# Patient Record
Sex: FEMALE | Race: BLACK OR AFRICAN AMERICAN | NOT HISPANIC OR LATINO | Employment: UNEMPLOYED | URBAN - METROPOLITAN AREA
[De-identification: names, ages, dates, MRNs, and addresses within clinical notes are randomized per-mention and may not be internally consistent; named-entity substitution may affect disease eponyms.]

---

## 2017-01-02 ENCOUNTER — ANESTHESIA EVENT (OUTPATIENT)
Dept: SURGERY | Facility: HOSPITAL | Age: 47
DRG: 857 | End: 2017-01-02
Payer: COMMERCIAL

## 2017-01-02 ENCOUNTER — ANESTHESIA (OUTPATIENT)
Dept: SURGERY | Facility: HOSPITAL | Age: 47
DRG: 857 | End: 2017-01-02
Payer: COMMERCIAL

## 2017-01-02 PROCEDURE — 63600175 PHARM REV CODE 636 W HCPCS: Performed by: NURSE ANESTHETIST, CERTIFIED REGISTERED

## 2017-01-02 PROCEDURE — 25000003 PHARM REV CODE 250: Performed by: NURSE ANESTHETIST, CERTIFIED REGISTERED

## 2017-01-02 RX ORDER — ROCURONIUM BROMIDE 10 MG/ML
INJECTION, SOLUTION INTRAVENOUS
Status: DISCONTINUED | OUTPATIENT
Start: 2017-01-02 | End: 2017-01-02

## 2017-01-02 RX ORDER — PROPOFOL 10 MG/ML
VIAL (ML) INTRAVENOUS
Status: DISCONTINUED | OUTPATIENT
Start: 2017-01-02 | End: 2017-01-02

## 2017-01-02 RX ORDER — SODIUM CHLORIDE, SODIUM LACTATE, POTASSIUM CHLORIDE, CALCIUM CHLORIDE 600; 310; 30; 20 MG/100ML; MG/100ML; MG/100ML; MG/100ML
INJECTION, SOLUTION INTRAVENOUS CONTINUOUS PRN
Status: DISCONTINUED | OUTPATIENT
Start: 2017-01-02 | End: 2017-01-02

## 2017-01-02 RX ORDER — LIDOCAINE HCL/PF 100 MG/5ML
SYRINGE (ML) INTRAVENOUS
Status: DISCONTINUED | OUTPATIENT
Start: 2017-01-02 | End: 2017-01-02

## 2017-01-02 RX ORDER — ONDANSETRON 2 MG/ML
INJECTION INTRAMUSCULAR; INTRAVENOUS
Status: DISCONTINUED | OUTPATIENT
Start: 2017-01-02 | End: 2017-01-02

## 2017-01-02 RX ORDER — FENTANYL CITRATE 50 UG/ML
INJECTION, SOLUTION INTRAMUSCULAR; INTRAVENOUS
Status: DISCONTINUED | OUTPATIENT
Start: 2017-01-02 | End: 2017-01-02

## 2017-01-02 RX ORDER — MIDAZOLAM HYDROCHLORIDE 1 MG/ML
INJECTION, SOLUTION INTRAMUSCULAR; INTRAVENOUS
Status: DISCONTINUED | OUTPATIENT
Start: 2017-01-02 | End: 2017-01-02

## 2017-01-02 RX ORDER — PHENYLEPHRINE HYDROCHLORIDE 10 MG/ML
INJECTION INTRAVENOUS
Status: DISCONTINUED | OUTPATIENT
Start: 2017-01-02 | End: 2017-01-02

## 2017-01-02 RX ORDER — SUCCINYLCHOLINE CHLORIDE 20 MG/ML
INJECTION INTRAMUSCULAR; INTRAVENOUS
Status: DISCONTINUED | OUTPATIENT
Start: 2017-01-02 | End: 2017-01-02

## 2017-01-02 RX ADMIN — PHENYLEPHRINE HYDROCHLORIDE 200 MCG: 10 INJECTION INTRAVENOUS at 02:01

## 2017-01-02 RX ADMIN — SUCCINYLCHOLINE CHLORIDE 140 MG: 20 INJECTION, SOLUTION INTRAMUSCULAR; INTRAVENOUS at 02:01

## 2017-01-02 RX ADMIN — ONDANSETRON 4 MG: 2 INJECTION, SOLUTION INTRAMUSCULAR; INTRAVENOUS at 02:01

## 2017-01-02 RX ADMIN — MIDAZOLAM HYDROCHLORIDE 2 MG: 1 INJECTION, SOLUTION INTRAMUSCULAR; INTRAVENOUS at 02:01

## 2017-01-02 RX ADMIN — LIDOCAINE HYDROCHLORIDE 80 MG: 20 INJECTION, SOLUTION INTRAVENOUS at 02:01

## 2017-01-02 RX ADMIN — SODIUM CHLORIDE, SODIUM LACTATE, POTASSIUM CHLORIDE, AND CALCIUM CHLORIDE: 600; 310; 30; 20 INJECTION, SOLUTION INTRAVENOUS at 02:01

## 2017-01-02 RX ADMIN — PROPOFOL 150 MG: 10 INJECTION, EMULSION INTRAVENOUS at 02:01

## 2017-01-02 RX ADMIN — PROPOFOL 100 MG: 10 INJECTION, EMULSION INTRAVENOUS at 02:01

## 2017-01-02 RX ADMIN — ROCURONIUM BROMIDE 5 MG: 10 INJECTION, SOLUTION INTRAVENOUS at 02:01

## 2017-01-02 RX ADMIN — FENTANYL CITRATE 100 MCG: 50 INJECTION, SOLUTION INTRAMUSCULAR; INTRAVENOUS at 02:01

## 2017-01-02 NOTE — ANESTHESIA RELEASE NOTE
"Anesthesia Release from PACU Note    Patient: Clarissa Turner    Procedure(s) Performed: Procedure(s):  INCISION AND DRAINAGE (I&D), ABSCESS abdominal wall    Anesthesia type: general    Post pain: Adequate analgesia    Post assessment: no apparent anesthetic complications    Last Vitals:   Visit Vitals    /66    Pulse 110    Temp 37.4 °C (99.3 °F) (Temporal)    Resp 16    Ht 5' 5" (1.651 m)    Wt 129.5 kg (285 lb 7.9 oz)    LMP 04/14/2015 (Exact Date)    SpO2 100%    Breastfeeding No    BMI 47.51 kg/m2       Post vital signs: stable    Level of consciousness: awake    Nausea/Vomiting: no nausea/no vomiting    Complications: none    Airway Patency: patent    Respiratory: unassisted    Cardiovascular: stable and blood pressure at baseline    Hydration: euvolemic  "

## 2017-01-02 NOTE — TRANSFER OF CARE
"Anesthesia Transfer of Care Note    Patient: Clarissa Turner    Procedure(s) Performed: Procedure(s):  INCISION AND DRAINAGE (I&D), ABSCESS abdominal wall    Patient location: PACU    Anesthesia Type: general    Transport from OR: Transported from OR on room air with adequate spontaneous ventilation    Post pain: adequate analgesia    Post assessment: no apparent anesthetic complications and tolerated procedure well    Post vital signs: stable    Level of consciousness: awake    Nausea/Vomiting: no nausea/vomiting    Complications: none          Last vitals:   Visit Vitals    /74 (BP Location: Right arm, Patient Position: Lying, BP Method: Automatic)    Pulse 101    Temp 37.3 °C (99.2 °F) (Oral)    Resp 18    Ht 5' 5" (1.651 m)    Wt 129.5 kg (285 lb 7.9 oz)    LMP 04/14/2015 (Exact Date)    SpO2 95%    Breastfeeding No    BMI 47.51 kg/m2     "

## 2017-01-02 NOTE — ANESTHESIA POSTPROCEDURE EVALUATION
"Anesthesia Post Evaluation    Patient: Clarissa Turner    Procedure(s) Performed: Procedure(s):  INCISION AND DRAINAGE (I&D), ABSCESS abdominal wall    Final Anesthesia Type: general  Patient location during evaluation: PACU  Patient participation: Yes- Able to Participate  Level of consciousness: awake and alert  Post-procedure vital signs: reviewed and stable  Pain management: adequate  Airway patency: patent  PONV status at discharge: No PONV  Anesthetic complications: no      Cardiovascular status: blood pressure returned to baseline  Respiratory status: unassisted  Hydration status: euvolemic  Follow-up not needed.        Visit Vitals    /66    Pulse 110    Temp 37.4 °C (99.3 °F) (Temporal)    Resp 16    Ht 5' 5" (1.651 m)    Wt 129.5 kg (285 lb 7.9 oz)    LMP 04/14/2015 (Exact Date)    SpO2 100%    Breastfeeding No    BMI 47.51 kg/m2       Pain/Miranda Score: Pain Assessment Performed: Yes (1/2/2017 12:04 PM)  Presence of Pain: complains of pain/discomfort (1/2/2017 12:04 PM)  Pain Rating Prior to Med Admin: 8 (1/2/2017 10:18 AM)  Pain Rating Post Med Admin: 5 (1/2/2017 11:18 AM)      "

## 2017-01-02 NOTE — ANESTHESIA PREPROCEDURE EVALUATION
01/02/2017  Clarissa Turner is a 46 y.o., female.    [facility] Anesthesia Evaluation    I have reviewed the Patient Summary Reports.        Review of Systems  Anesthesia Hx:  Hx of Anesthetic complications  History of prior surgery of interest to airway management or planning: Previous anesthesia: General Airway issues documented on chart review include mask, easy, GETA, easy direct laryngoscopy  Denies Family Hx of Anesthesia complications.  Personal Hx of Anesthesia complications, Post-Operative Nausea/Vomiting, in the past, but not with recent anesthetics / prophylaxis   Social:  Non-Smoker    Cardiovascular:   Hypertension, well controlled  Hypertension, Essential Hypertension    Endocrine:   Diabetes, poorly controlled, type 2  Diabetes, Type 2 Diabetes , controlled by diet, oral hypoglycemics. Secondary etiology is Infection.  Thyroid Disease, Hyperthyroidism        Physical Exam  General:  Morbid Obesity    Airway/Jaw/Neck:  Airway Findings: Mouth Opening: Normal      Chest/Lungs:  Chest/Lungs Findings: Clear to auscultation     Heart/Vascular:  Heart Findings: Rate: Normal  Rhythm: Regular Rhythm        Mental Status:  Mental Status Findings:  Cooperative         Anesthesia Plan  Type of Anesthesia, risks & benefits discussed:  Anesthesia Type:  general  Patient's Preference:   Intra-op Monitoring Plan:   Intra-op Monitoring Plan Comments:   Post Op Pain Control Plan:   Post Op Pain Control Plan Comments:   Induction:    Beta Blocker:         Informed Consent: Patient understands risks and agrees with Anesthesia plan.  Questions answered.   ASA Score: 2     Day of Surgery Review of History & Physical:            Ready For Surgery From Anesthesia Perspective.

## 2017-01-06 ENCOUNTER — ANESTHESIA EVENT (OUTPATIENT)
Dept: SURGERY | Facility: HOSPITAL | Age: 47
DRG: 857 | End: 2017-01-06
Payer: COMMERCIAL

## 2017-01-06 ENCOUNTER — ANESTHESIA (OUTPATIENT)
Dept: SURGERY | Facility: HOSPITAL | Age: 47
DRG: 857 | End: 2017-01-06
Payer: COMMERCIAL

## 2017-01-06 PROCEDURE — 25000003 PHARM REV CODE 250: Performed by: SURGERY

## 2017-01-06 PROCEDURE — 25000003 PHARM REV CODE 250: Performed by: NURSE ANESTHETIST, CERTIFIED REGISTERED

## 2017-01-06 PROCEDURE — 63600175 PHARM REV CODE 636 W HCPCS: Performed by: NURSE ANESTHETIST, CERTIFIED REGISTERED

## 2017-01-06 RX ORDER — DEXAMETHASONE SODIUM PHOSPHATE 4 MG/ML
INJECTION, SOLUTION INTRA-ARTICULAR; INTRALESIONAL; INTRAMUSCULAR; INTRAVENOUS; SOFT TISSUE
Status: DISCONTINUED | OUTPATIENT
Start: 2017-01-06 | End: 2017-01-06

## 2017-01-06 RX ORDER — ALBUTEROL SULFATE 90 UG/1
AEROSOL, METERED RESPIRATORY (INHALATION)
Status: DISCONTINUED | OUTPATIENT
Start: 2017-01-06 | End: 2017-01-06

## 2017-01-06 RX ORDER — PROPOFOL 10 MG/ML
VIAL (ML) INTRAVENOUS
Status: DISCONTINUED | OUTPATIENT
Start: 2017-01-06 | End: 2017-01-06

## 2017-01-06 RX ORDER — ONDANSETRON 2 MG/ML
INJECTION INTRAMUSCULAR; INTRAVENOUS
Status: DISCONTINUED | OUTPATIENT
Start: 2017-01-06 | End: 2017-01-06

## 2017-01-06 RX ORDER — FENTANYL CITRATE 50 UG/ML
INJECTION, SOLUTION INTRAMUSCULAR; INTRAVENOUS
Status: DISCONTINUED | OUTPATIENT
Start: 2017-01-06 | End: 2017-01-06

## 2017-01-06 RX ORDER — MIDAZOLAM HYDROCHLORIDE 1 MG/ML
INJECTION, SOLUTION INTRAMUSCULAR; INTRAVENOUS
Status: DISCONTINUED | OUTPATIENT
Start: 2017-01-06 | End: 2017-01-06

## 2017-01-06 RX ORDER — SUCCINYLCHOLINE CHLORIDE 20 MG/ML
INJECTION INTRAMUSCULAR; INTRAVENOUS
Status: DISCONTINUED | OUTPATIENT
Start: 2017-01-06 | End: 2017-01-06

## 2017-01-06 RX ORDER — ROCURONIUM BROMIDE 10 MG/ML
INJECTION, SOLUTION INTRAVENOUS
Status: DISCONTINUED | OUTPATIENT
Start: 2017-01-06 | End: 2017-01-06

## 2017-01-06 RX ADMIN — PROPOFOL 100 MG: 10 INJECTION, EMULSION INTRAVENOUS at 12:01

## 2017-01-06 RX ADMIN — ROCURONIUM BROMIDE 5 MG: 10 INJECTION, SOLUTION INTRAVENOUS at 12:01

## 2017-01-06 RX ADMIN — SUCCINYLCHOLINE CHLORIDE 140 MG: 20 INJECTION, SOLUTION INTRAMUSCULAR; INTRAVENOUS at 12:01

## 2017-01-06 RX ADMIN — ONDANSETRON 4 MG: 2 INJECTION, SOLUTION INTRAMUSCULAR; INTRAVENOUS at 12:01

## 2017-01-06 RX ADMIN — SODIUM CHLORIDE, SODIUM LACTATE, POTASSIUM CHLORIDE, AND CALCIUM CHLORIDE: 600; 310; 30; 20 INJECTION, SOLUTION INTRAVENOUS at 12:01

## 2017-01-06 RX ADMIN — SUCCINYLCHOLINE CHLORIDE 20 MG: 20 INJECTION, SOLUTION INTRAMUSCULAR; INTRAVENOUS at 12:01

## 2017-01-06 RX ADMIN — ALBUTEROL SULFATE 3 PUFF: 90 AEROSOL, METERED RESPIRATORY (INHALATION) at 01:01

## 2017-01-06 RX ADMIN — PROPOFOL 150 MG: 10 INJECTION, EMULSION INTRAVENOUS at 12:01

## 2017-01-06 RX ADMIN — DEXAMETHASONE SODIUM PHOSPHATE 8 MG: 4 INJECTION, SOLUTION INTRA-ARTICULAR; INTRALESIONAL; INTRAMUSCULAR; INTRAVENOUS; SOFT TISSUE at 12:01

## 2017-01-06 RX ADMIN — FENTANYL CITRATE 100 MCG: 50 INJECTION, SOLUTION INTRAMUSCULAR; INTRAVENOUS at 12:01

## 2017-01-06 RX ADMIN — MIDAZOLAM HYDROCHLORIDE 2 MG: 1 INJECTION, SOLUTION INTRAMUSCULAR; INTRAVENOUS at 12:01

## 2017-01-06 NOTE — ANESTHESIA PREPROCEDURE EVALUATION
01/06/2017  Clarissa Turner is a 46 y.o., female.    [facility] Anesthesia Evaluation    I have reviewed the Patient Summary Reports.    I have reviewed the Nursing Notes.   I have reviewed the Medications.     Review of Systems  Anesthesia Hx:  No problems with previous Anesthesia    Social:  Non-Smoker    Hematology/Oncology:         -- Anemia:   Cardiovascular:   Hypertension    Pulmonary:  Pulmonary Normal    Renal/:   Chronic Renal Disease    Neurological:  Neurology Normal    Endocrine:   Diabetes, type 2, using insulin        Physical Exam  General:  Morbid Obesity    Airway/Jaw/Neck:  Airway Findings: Mouth Opening: Normal General Airway Assessment: Adult  Mallampati: I  TM Distance: Normal, at least 6 cm       Chest/Lungs:  Chest/Lungs Findings: Clear to auscultation     Heart/Vascular:  Heart Findings: Rate: Normal  Rhythm: Regular Rhythm        Mental Status:  Mental Status Findings:  Cooperative, Alert and Oriented         Anesthesia Plan  Type of Anesthesia, risks & benefits discussed:  Anesthesia Type:  general  Patient's Preference:   Intra-op Monitoring Plan:   Intra-op Monitoring Plan Comments:   Post Op Pain Control Plan:   Post Op Pain Control Plan Comments:   Induction:   IV  Beta Blocker:  Patient is not currently on a Beta-Blocker (No further documentation required).       Informed Consent: Patient understands risks and agrees with Anesthesia plan.  Questions answered. Anesthesia consent signed with patient.  ASA Score: 3     Day of Surgery Review of History & Physical: I have interviewed and examined the patient. I have reviewed the patient's H&P dated:  There are no significant changes.          Ready For Surgery From Anesthesia Perspective.

## 2017-01-06 NOTE — TRANSFER OF CARE
"Anesthesia Transfer of Care Note    Patient: Clarissa Turner    Procedure(s) Performed: Procedure(s) with comments:  INCISION AND DRAINAGE (I&D), ABSCESS abdominal wall with wound vac placement - Wound vac    Patient location: PACU    Anesthesia Type: general    Transport from OR: Transported from OR on room air with adequate spontaneous ventilation    Post pain: adequate analgesia    Post assessment: no apparent anesthetic complications    Post vital signs: stable    Level of consciousness: lethargic    Nausea/Vomiting: no nausea/vomiting    Complications: none    Comments: RT at bedside; Bipap placed      Last vitals:   Visit Vitals    /68 (BP Location: Right arm, Patient Position: Lying, BP Method: Automatic)    Pulse 75    Temp 36.9 °C (98.4 °F) (Oral)    Resp 18    Ht 5' 5" (1.651 m)    Wt 129.5 kg (285 lb 7.9 oz)    LMP 04/14/2015 (Exact Date)    SpO2 98%    Breastfeeding No    BMI 47.51 kg/m2     "

## 2017-01-06 NOTE — ANESTHESIA POSTPROCEDURE EVALUATION
"Anesthesia Post Evaluation    Patient: Clarissa Turner    Procedure(s) Performed: Procedure(s) with comments:  INCISION AND DRAINAGE (I&D), ABSCESS abdominal wall with wound vac placement - Wound vac    Final Anesthesia Type: general  Patient location during evaluation: PACU  Patient participation: Yes- Able to Participate  Level of consciousness: awake and alert and oriented  Post-procedure vital signs: reviewed and stable  Pain management: adequate  Airway patency: patent  PONV status at discharge: No PONV  Anesthetic complications: no      Cardiovascular status: blood pressure returned to baseline and hemodynamically stable  Respiratory status: unassisted  Hydration status: euvolemic  Follow-up not needed.        Visit Vitals    /81 (BP Location: Left arm, Patient Position: Lying, BP Method: Automatic)    Pulse 79    Temp 37.1 °C (98.8 °F) (Temporal)    Resp 16    Ht 5' 5" (1.651 m)    Wt 129.5 kg (285 lb 7.9 oz)    LMP 04/14/2015 (Exact Date)    SpO2 95%    Breastfeeding No    BMI 47.51 kg/m2       Pain/Miranda Score: Pain Assessment Performed: Yes (1/6/2017  2:30 PM)  Presence of Pain: complains of pain/discomfort (1/6/2017  2:30 PM)  Pain Rating Prior to Med Admin: 7 (1/6/2017  2:05 PM)  Pain Rating Post Med Admin: 3 (1/6/2017  9:53 AM)  Miranda Score: 8 (1/6/2017  2:30 PM)      "

## 2017-01-24 DIAGNOSIS — E66.9 DIABETES MELLITUS TYPE 2 IN OBESE: ICD-10-CM

## 2017-01-24 DIAGNOSIS — E78.5 HYPERLIPIDEMIA ASSOCIATED WITH TYPE 2 DIABETES MELLITUS: ICD-10-CM

## 2017-01-24 DIAGNOSIS — E11.69 DIABETES MELLITUS TYPE 2 IN OBESE: ICD-10-CM

## 2017-01-24 DIAGNOSIS — E11.69 HYPERLIPIDEMIA ASSOCIATED WITH TYPE 2 DIABETES MELLITUS: ICD-10-CM

## 2017-01-24 RX ORDER — ATORVASTATIN CALCIUM 80 MG/1
TABLET, FILM COATED ORAL
Qty: 30 TABLET | Refills: 1 | Status: SHIPPED | OUTPATIENT
Start: 2017-01-24 | End: 2018-08-02

## 2017-02-17 ENCOUNTER — TELEPHONE (OUTPATIENT)
Dept: INTERNAL MEDICINE | Facility: CLINIC | Age: 47
End: 2017-02-17

## 2017-02-17 NOTE — TELEPHONE ENCOUNTER
----- Message from Chandrakant Gabriel sent at 2/17/2017 10:31 AM CST -----  Contact: pt  She's calling in regards to a wound after surgery, please advise, [phone] (home)

## 2017-02-17 NOTE — TELEPHONE ENCOUNTER
Patient informed that dr. Garcia does not do wound care.  Encouraged her to reschedule her missed post op visit with dr. Jeansonne.

## 2017-02-22 ENCOUNTER — TELEPHONE (OUTPATIENT)
Dept: INTERNAL MEDICINE | Facility: CLINIC | Age: 47
End: 2017-02-22

## 2017-02-22 NOTE — TELEPHONE ENCOUNTER
----- Message from Franco Ryan sent at 2/22/2017  1:07 PM CST -----  Contact: Pt  Pt request call from nurse regarding her heart rate that has been running really high but she is ok for now but wants to discuss, please contact pt at [phone]

## 2017-02-22 NOTE — TELEPHONE ENCOUNTER
Reji with [facility] informed that patient needs to get in to be seen. He stated that patients BP was 150/98 and P- 116. He stated that the patient made the comment that in the hospital she was not given her BP medication and wanted to know if she needed to restart them. Informed Reji that I did not show in the patients chart that she was taking Losartan. Informed him at patients last appointment it was removed from patients chart. He stated that he will have patient call to scheduled appointment to be seen

## 2017-02-22 NOTE — TELEPHONE ENCOUNTER
----- Message from Jacqueline Enciso sent at 2/22/2017  9:40 AM CST -----  Contact: Reji/ [facility]  Reji is requesting to speak with nurse regarding pt vital signs. Pt was discharged from hospital on 2/21/17 and has a high heart rate and high bp, Reji is seeking advice on whether or not pt needs to come in to be seen. [facility] office was contacted nurse stated she was in clinic. Once clarification was given on issues of pt vitals called office back no answer. Pls call Reij back at [phone]

## 2017-02-23 ENCOUNTER — TELEPHONE (OUTPATIENT)
Dept: CARDIOLOGY | Facility: CLINIC | Age: 47
End: 2017-02-23

## 2017-02-23 ENCOUNTER — OFFICE VISIT (OUTPATIENT)
Dept: SURGERY | Facility: CLINIC | Age: 47
End: 2017-02-23
Payer: COMMERCIAL

## 2017-02-23 VITALS
SYSTOLIC BLOOD PRESSURE: 149 MMHG | TEMPERATURE: 98 F | DIASTOLIC BLOOD PRESSURE: 100 MMHG | WEIGHT: 276.25 LBS | BODY MASS INDEX: 45.97 KG/M2 | HEART RATE: 118 BPM

## 2017-02-23 DIAGNOSIS — L02.211 ABDOMINAL WALL ABSCESS: Primary | ICD-10-CM

## 2017-02-23 PROCEDURE — 99024 POSTOP FOLLOW-UP VISIT: CPT | Mod: S$GLB,,, | Performed by: SURGERY

## 2017-02-23 PROCEDURE — 99999 PR PBB SHADOW E&M-EST. PATIENT-LVL II: CPT | Mod: PBBFAC,,, | Performed by: SURGERY

## 2017-02-23 NOTE — TELEPHONE ENCOUNTER
Incoming call from stephen with [facility] . PT/INR order given for patient who was recently discharged from the hospital.   P: [phone]  F:[phone]

## 2017-02-23 NOTE — PROGRESS NOTES
Clarissa Turner is status post drainage of abdominal wall abscess with rehab placement for wound care.  She is tolerating a regular diet and denies fevers, nausea or vomiting.  Wound is almost completely healed.    PE: Abdomen is soft, non-tender, non-distended.   Wound is 1 cm wide and 1 cm deep.    A/P:  Wound is healing well.    Continue wound care.  Return to clinic as needed.

## 2017-02-24 ENCOUNTER — ANTI-COAG VISIT (OUTPATIENT)
Dept: CARDIOLOGY | Facility: CLINIC | Age: 47
End: 2017-02-24

## 2017-02-24 ENCOUNTER — OFFICE VISIT (OUTPATIENT)
Dept: INTERNAL MEDICINE | Facility: CLINIC | Age: 47
End: 2017-02-24
Payer: COMMERCIAL

## 2017-02-24 VITALS
HEART RATE: 135 BPM | DIASTOLIC BLOOD PRESSURE: 100 MMHG | TEMPERATURE: 97 F | HEIGHT: 65 IN | SYSTOLIC BLOOD PRESSURE: 136 MMHG | BODY MASS INDEX: 45.81 KG/M2 | WEIGHT: 274.94 LBS

## 2017-02-24 DIAGNOSIS — Z79.01 LONG TERM (CURRENT) USE OF ANTICOAGULANTS: Primary | ICD-10-CM

## 2017-02-24 DIAGNOSIS — I10 ESSENTIAL HYPERTENSION: ICD-10-CM

## 2017-02-24 DIAGNOSIS — E78.5 HYPERLIPIDEMIA WITH TARGET LDL LESS THAN 100: ICD-10-CM

## 2017-02-24 DIAGNOSIS — I82.432 ACUTE DEEP VEIN THROMBOSIS (DVT) OF POPLITEAL VEIN OF LEFT LOWER EXTREMITY: ICD-10-CM

## 2017-02-24 DIAGNOSIS — F51.01 PRIMARY INSOMNIA: ICD-10-CM

## 2017-02-24 DIAGNOSIS — R00.0 SINUS TACHYCARDIA: ICD-10-CM

## 2017-02-24 LAB — INR PPP: 2.4

## 2017-02-24 PROCEDURE — 3045F PR MOST RECENT HEMOGLOBIN A1C LEVEL 7.0-9.0%: CPT | Mod: S$GLB,,, | Performed by: FAMILY MEDICINE

## 2017-02-24 PROCEDURE — 3080F DIAST BP >= 90 MM HG: CPT | Mod: S$GLB,,, | Performed by: FAMILY MEDICINE

## 2017-02-24 PROCEDURE — 3075F SYST BP GE 130 - 139MM HG: CPT | Mod: S$GLB,,, | Performed by: FAMILY MEDICINE

## 2017-02-24 PROCEDURE — 99999 PR PBB SHADOW E&M-EST. PATIENT-LVL III: CPT | Mod: PBBFAC,,, | Performed by: FAMILY MEDICINE

## 2017-02-24 PROCEDURE — 4010F ACE/ARB THERAPY RXD/TAKEN: CPT | Mod: S$GLB,,, | Performed by: FAMILY MEDICINE

## 2017-02-24 PROCEDURE — 99214 OFFICE O/P EST MOD 30 MIN: CPT | Mod: S$GLB,,, | Performed by: FAMILY MEDICINE

## 2017-02-24 PROCEDURE — 1160F RVW MEDS BY RX/DR IN RCRD: CPT | Mod: S$GLB,,, | Performed by: FAMILY MEDICINE

## 2017-02-24 RX ORDER — ZOLPIDEM TARTRATE 10 MG/1
5 TABLET ORAL NIGHTLY PRN
COMMUNITY
End: 2017-03-08

## 2017-02-24 RX ORDER — LOSARTAN POTASSIUM 25 MG/1
25 TABLET ORAL DAILY
Qty: 90 TABLET | Refills: 0 | Status: SHIPPED | OUTPATIENT
Start: 2017-02-24 | End: 2017-12-07 | Stop reason: SDUPTHER

## 2017-02-24 RX ORDER — SULFAMETHOXAZOLE AND TRIMETHOPRIM 800; 160 MG/1; MG/1
1 TABLET ORAL 2 TIMES DAILY
COMMUNITY
End: 2017-03-08 | Stop reason: ALTCHOICE

## 2017-02-24 RX ORDER — INSULIN LISPRO 100 [IU]/ML
INJECTION, SOLUTION INTRAVENOUS; SUBCUTANEOUS
Qty: 1 BOX | Refills: 1 | OUTPATIENT
Start: 2017-02-24 | End: 2017-05-17 | Stop reason: SDUPTHER

## 2017-02-24 RX ORDER — OXYCODONE HCL 10 MG/1
10 TABLET, FILM COATED, EXTENDED RELEASE ORAL EVERY 12 HOURS PRN
COMMUNITY
End: 2017-03-08

## 2017-02-24 RX ORDER — TRAZODONE HYDROCHLORIDE 100 MG/1
100 TABLET ORAL NIGHTLY
Qty: 30 TABLET | Refills: 3 | Status: SHIPPED | OUTPATIENT
Start: 2017-02-24 | End: 2017-07-26

## 2017-02-24 RX ORDER — PANTOPRAZOLE SODIUM 40 MG/1
40 TABLET, DELAYED RELEASE ORAL DAILY
COMMUNITY
End: 2017-05-25 | Stop reason: SDUPTHER

## 2017-02-24 RX ORDER — FLUCONAZOLE 150 MG/1
150 TABLET ORAL DAILY
COMMUNITY
End: 2017-03-08 | Stop reason: ALTCHOICE

## 2017-02-24 NOTE — PROGRESS NOTES
Verbal result taken from __Dee/[facility]_______. PT/INR __2.4_____ Date drawn___2/24_____ Continue dose, repeat INR in 1 week.

## 2017-02-24 NOTE — PROGRESS NOTES
Subjective:       Patient ID: Clarissa Turner is a 47 y.o. female.    Chief Complaint: Hypertension and Medication Problem    HPI Comments: F/U:      Pt is a 47 year old who had complications 2nd to abdominal hernia surgery. Pt had infection 2nd to surgery. Pt reports being in and out of hospital due to infection. Pt does have DM and prior to her sugars were well controlled at 6.9 and 7.1 and no 7.4. Pt is on Lantus, glipizide and metformin.     Hypertension   This is a chronic problem. The current episode started more than 1 year ago. The problem has been waxing and waning since onset. Associated symptoms include anxiety. Pertinent negatives include no malaise/fatigue, orthopnea, palpitations or peripheral edema. There are no associated agents to hypertension. Risk factors for coronary artery disease include diabetes mellitus and dyslipidemia. Past treatments include nothing (was on losartan). There are no compliance problems.  There is no history of angina, heart failure or left ventricular hypertrophy. There is no history of chronic renal disease, hyperaldosteronism, hypercortisolism or a hypertension causing med.     Review of Systems   Constitutional: Negative.  Negative for malaise/fatigue.   Respiratory: Negative.    Cardiovascular: Negative.  Negative for palpitations and orthopnea.   Genitourinary: Negative.    Musculoskeletal: Negative.    Neurological: Negative.    Hematological: Negative.    Psychiatric/Behavioral: Negative.        Objective:      Physical Exam   Constitutional: She appears well-developed and well-nourished.   Cardiovascular: Normal rate and regular rhythm.    Pulmonary/Chest: Effort normal and breath sounds normal.   Feet:   Right Foot:   Protective Sensation: 10 sites tested. 10 sites sensed.   Skin Integrity: Positive for warmth.   Left Foot:   Protective Sensation: 10 sites tested. 10 sites sensed.   Skin Integrity: Positive for warmth.       Assessment:       1. Uncontrolled  type 2 diabetes mellitus without complication, with long-term current use of insulin    2. Hyperlipidemia with target LDL less than 100    3. Essential hypertension    4. Primary insomnia    5. Sinus tachycardia        Plan:       Uncontrolled type 2 diabetes mellitus without complication, with long-term current use of insulin  Comments:  Sugar may be bouncing around due to infection. Start on sliding scale RTC in 2 weeks with sugars may need to go up on metformin    Hyperlipidemia with target LDL less than 100  Comments:  Will continue to watch her cholesterol    Essential hypertension  Comments:  Past 2 months severe infection, fluid imbalance may have caused some BP problems. Will go back on the losartan if nothing more than renal protective    Primary insomnia  Comments:  Have pt stop the Ambien or do 1/2 for next 2 days while starting on Trazodone.     Sinus tachycardia  Comments:  Will refer to Cardiology. Pt concerned.  Orders:  -     Ambulatory referral to Cardiology    Other orders  -     HUMALOG KWIKPEN 100 unit/mL InPn pen; Sliding Scale:    150-200- 4 units  201-250= 6 unit  251-300= 8 units  301 greater 10 units and caused  Dispense: 1 Box; Refill: 1  -     losartan (COZAAR) 25 MG tablet; Take 1 tablet (25 mg total) by mouth once daily.  Dispense: 90 tablet; Refill: 0  -     trazodone (DESYREL) 100 MG tablet; Take 1 tablet (100 mg total) by mouth every evening.  Dispense: 30 tablet; Refill: 3

## 2017-02-24 NOTE — MR AVS SNAPSHOT
[facility]  [address]  [place] [address]  Phone: [phone]  Fax: [phone]                  Clarissa Turner   2017 9:00 AM   Office Visit    Description:  Female : 1970   Provider:  Beto Garcia MD   Department:  [facility] Internal Medicine           Reason for Visit     Hypertension     Medication Problem           Diagnoses this Visit        Comments    Uncontrolled type 2 diabetes mellitus without complication, with long-term current use of insulin    -  Primary     Hyperlipidemia with target LDL less than 100         Essential hypertension         Primary insomnia     Have pt stop the Ambien or do 1/2 for next 2 days while starting on Trazodone.     Sinus tachycardia                To Do List           Goals (5 Years of Data)     None       These Medications        Disp Refills Start End    HUMALOG KWIKPEN 100 unit/mL InPn pen 1 Box 1 2017     Sliding Scale:    150-200- 4 units  201-250= 6 unit  251-300= 8 units  301 greater 10 units and caused    Pharmacy: [facility] [address] [place] [address] Ph #: [phone]       losartan (COZAAR) 25 MG tablet 90 tablet 0 2017    Take 1 tablet (25 mg total) by mouth once daily. - Oral    Pharmacy: [address] Ph #: [phone]       trazodone (DESYREL) 100 MG tablet 30 tablet 3 2017    Take 1 tablet (100 mg total) by mouth every evening. - Oral    Pharmacy: [facility] [address] [place] [address] Ph #: [phone]         [facility]     [facility] On Call Nurse Care Line -  Assistance  Registered nurses in the [facility] On Call Center provide clinical advisement, health education, appointment booking, and other advisory services.  Call for this free service at [phone].             Medications           Message regarding Medications     Verify the changes and/or additions to  your medication regime listed below are the same as discussed with your clinician today.  If any of these changes or additions are incorrect, please notify your healthcare provider.        START taking these NEW medications        Refills    losartan (COZAAR) 25 MG tablet 0    Sig: Take 1 tablet (25 mg total) by mouth once daily.    Class: Normal    Route: Oral    trazodone (DESYREL) 100 MG tablet 3    Sig: Take 1 tablet (100 mg total) by mouth every evening.    Class: Normal    Route: Oral      CHANGE how you are taking these medications     Start Taking Instead of    HUMALOG KWIKPEN 100 unit/mL InPn pen HUMALOG KWIKPEN 100 unit/mL InPn pen    Dosage:  Sliding Scale:    150-200- 4 units  201-250= 6 unit  251-300= 8 units  301 greater 10 units and caused Dosage:  Inject 20 Units into the skin 3 (three) times daily.    Reason for Change:  Reorder       STOP taking these medications     ciprofloxacin, CIPRO,400mg/200ml D5W IVPB (CIPRO) 400 mg/200 mL IVPB Inject 200 mLs (400 mg total) into the vein every 12 (twelve) hours.    HEPARIN SODIUM,PORCINE/D5W (HEPARIN, PORCINE, IN 5 % DEX) 25,000 unit/250 mL(100 unit/mL) infusion Inject 2,201.5 Units/hr into the vein continuous.    nystatin-triamcinolone (MYCOLOG II) cream APPLY TO AFFECTED AREA 2 TIMES DAILY           Verify that the below list of medications is an accurate representation of the medications you are currently taking.  If none reported, the list may be blank. If incorrect, please contact your healthcare provider. Carry this list with you in case of emergency.           Current Medications     acetaminophen (TYLENOL) 500 MG tablet Take 1,000 mg by mouth every 6 (six) hours as needed for Pain.    atorvastatin (LIPITOR) 80 MG tablet TAKE ONE TABLET BY MOUTH ONCE DAILY    blood sugar diagnostic Strp Use to check blood sugars twice daily.    blood-glucose meter (ONETOUCH VERIO IQ METER) kit Use as instructed    DOCUSATE CALCIUM (STOOL SOFTENER ORAL) Take 2 tablets  "by mouth 2 (two) times daily.    fluconazole (DIFLUCAN) 150 MG Tab Take 150 mg by mouth once daily.    gabapentin (NEURONTIN) 300 MG capsule Take 600 mg by mouth every evening.     glipiZIDE (GLUCOTROL) 5 MG tablet Take 1 tablet (5 mg total) by mouth daily with breakfast.    insulin glargine (LANTUS SOLOSTAR) 100 unit/mL (3 mL) InPn pen Inject 30 Units into the skin every evening.    lancets [facility] Use to check blood sugars twice daily.    lidocaine (LIDODERM) 5 % APPLY 1 PATCH FOR 12 HOURS THEN 12 HOURS OFF PRN P    metformin (GLUCOPHAGE) 500 MG tablet Take 1 tablet (500 mg total) by mouth 2 (two) times daily with meals.    oxycodone (OXYCONTIN) 10 mg 12 hr tablet Take 10 mg by mouth every 12 (twelve) hours as needed for Pain.    pantoprazole (PROTONIX) 40 MG tablet Take 40 mg by mouth once daily.    pen needle, diabetic (SURE-FINE PEN NEEDLES) 31 gauge x 3/16" Ndle 1 each by Misc.(Non-Drug; Combo Route) route 4 (four) times daily with meals and nightly.    sulfamethoxazole-trimethoprim 800-160mg (BACTRIM DS) 800-160 mg Tab Take 1 tablet by mouth 2 (two) times daily.    warfarin (COUMADIN) 10 MG tablet Take 20mg oral daily Monday thru Saturday and take 10mg oral on sundays    zolpidem (AMBIEN) 10 mg Tab Take 5 mg by mouth nightly as needed.    esomeprazole (NEXIUM) 40 MG capsule Take 1 capsule (40 mg total) by mouth before breakfast.    HUMALOG KWIKPEN 100 unit/mL InPn pen Sliding Scale:    150-200- 4 units  201-250= 6 unit  251-300= 8 units  301 greater 10 units and caused    levothyroxine (SYNTHROID) 75 MCG tablet Take 1 tablet (75 mcg total) by mouth once daily. Avoid calcium, iron, food, or fiber within 1 hour of taking medication.    losartan (COZAAR) 25 MG tablet Take 1 tablet (25 mg total) by mouth once daily.    trazodone (DESYREL) 100 MG tablet Take 1 tablet (100 mg total) by mouth every evening.           Clinical Reference Information           Your Vitals Were     BP                   136/100 (BP " Location: Right arm, Patient Position: Sitting, BP Method: Manual)           Blood Pressure          Most Recent Value    BP  (!)  136/100      Allergies as of 2/24/2017     Ace Inhibitors      Immunizations Administered on Date of Encounter - 2/24/2017     None      Orders Placed During Today's Visit      Normal Orders This Visit    Ambulatory referral to Cardiology       Instructions    150-200= 4 units  201 - 250= 6 units  251-300= 8 units  Over 300 10 units and call       Language Assistance Services     ATTENTION: Language assistance services are available, free of charge. Please call [phone].      ATENCIÓN: Si habla español, tiene a cordoba disposición servicios gratuitos de asistencia lingüística. Llame al [phone].     JULY Ý: N?u b?n nói Ti?ng Vi?t, có các d?ch v? h? tr? ngôn ng? mi?n phí dành cho b?n. G?i s? [phone].         [facility] complies with applicable Federal civil rights laws and does not discriminate on the basis of race, color, national origin, age, disability, or sex.

## 2017-03-03 ENCOUNTER — DOCUMENTATION ONLY (OUTPATIENT)
Dept: HEMATOLOGY/ONCOLOGY | Facility: CLINIC | Age: 47
End: 2017-03-03

## 2017-03-03 DIAGNOSIS — Z79.01 ANTICOAGULATION MONITORING, INR RANGE 2-3: Primary | ICD-10-CM

## 2017-03-03 NOTE — PROGRESS NOTES
Mrs Turner had an INR today of 3.0 on 16 mg a day of coumadin. Asked to hold coumadin today and se feliberto in 6 days with a stat INR

## 2017-03-06 ENCOUNTER — PATIENT MESSAGE (OUTPATIENT)
Dept: HEMATOLOGY/ONCOLOGY | Facility: CLINIC | Age: 47
End: 2017-03-06

## 2017-03-06 DIAGNOSIS — D68.59 PRIMARY THROMBOPHILIA: Chronic | ICD-10-CM

## 2017-03-07 RX ORDER — WARFARIN 6 MG/1
TABLET ORAL
Qty: 168 TABLET | Refills: 0 | Status: SHIPPED | OUTPATIENT
Start: 2017-03-07 | End: 2017-04-06 | Stop reason: DRUGHIGH

## 2017-03-07 RX ORDER — WARFARIN 10 MG/1
TABLET ORAL
Qty: 60 TABLET | Refills: 4 | OUTPATIENT
Start: 2017-03-07

## 2017-03-07 NOTE — TELEPHONE ENCOUNTER
Spoke with patient.  She is coming in on Thursday.  She needs a refill on her warfarin. She is out

## 2017-03-08 ENCOUNTER — OFFICE VISIT (OUTPATIENT)
Dept: CARDIOLOGY | Facility: CLINIC | Age: 47
End: 2017-03-08
Payer: COMMERCIAL

## 2017-03-08 ENCOUNTER — OFFICE VISIT (OUTPATIENT)
Dept: INTERNAL MEDICINE | Facility: CLINIC | Age: 47
End: 2017-03-08
Payer: COMMERCIAL

## 2017-03-08 VITALS
SYSTOLIC BLOOD PRESSURE: 122 MMHG | HEIGHT: 65 IN | DIASTOLIC BLOOD PRESSURE: 96 MMHG | HEART RATE: 104 BPM | WEIGHT: 282.88 LBS | BODY MASS INDEX: 47.13 KG/M2

## 2017-03-08 VITALS
BODY MASS INDEX: 47.02 KG/M2 | WEIGHT: 282.19 LBS | DIASTOLIC BLOOD PRESSURE: 78 MMHG | SYSTOLIC BLOOD PRESSURE: 118 MMHG | HEIGHT: 65 IN | TEMPERATURE: 98 F | HEART RATE: 125 BPM

## 2017-03-08 DIAGNOSIS — E78.5 HYPERLIPIDEMIA WITH TARGET LDL LESS THAN 100: ICD-10-CM

## 2017-03-08 DIAGNOSIS — J32.0 MAXILLARY SINUSITIS, UNSPECIFIED CHRONICITY: Primary | ICD-10-CM

## 2017-03-08 DIAGNOSIS — R91.1 PULMONARY NODULE, RIGHT: ICD-10-CM

## 2017-03-08 DIAGNOSIS — E11.22 CKD STAGE 3 DUE TO TYPE 2 DIABETES MELLITUS: ICD-10-CM

## 2017-03-08 DIAGNOSIS — E66.9 DIABETES MELLITUS TYPE 2 IN OBESE: ICD-10-CM

## 2017-03-08 DIAGNOSIS — D68.59 PRIMARY THROMBOPHILIA: Chronic | ICD-10-CM

## 2017-03-08 DIAGNOSIS — I82.431 DEEP VEIN THROMBOSIS (DVT) OF POPLITEAL VEIN OF RIGHT LOWER EXTREMITY, UNSPECIFIED CHRONICITY: ICD-10-CM

## 2017-03-08 DIAGNOSIS — E11.69 DIABETES MELLITUS TYPE 2 IN OBESE: ICD-10-CM

## 2017-03-08 DIAGNOSIS — R10.32 LEFT LOWER QUADRANT PAIN: ICD-10-CM

## 2017-03-08 DIAGNOSIS — E11.69 HYPERLIPIDEMIA ASSOCIATED WITH TYPE 2 DIABETES MELLITUS: ICD-10-CM

## 2017-03-08 DIAGNOSIS — I10 ESSENTIAL HYPERTENSION: Primary | ICD-10-CM

## 2017-03-08 DIAGNOSIS — R19.7 DIARRHEA, UNSPECIFIED TYPE: ICD-10-CM

## 2017-03-08 DIAGNOSIS — Z86.711 HISTORY OF PULMONARY EMBOLISM: ICD-10-CM

## 2017-03-08 DIAGNOSIS — Z79.01 ANTICOAGULATION MONITORING, INR RANGE 2-3: ICD-10-CM

## 2017-03-08 DIAGNOSIS — I82.432 ACUTE DEEP VEIN THROMBOSIS (DVT) OF POPLITEAL VEIN OF LEFT LOWER EXTREMITY: ICD-10-CM

## 2017-03-08 DIAGNOSIS — N18.30 CKD STAGE 3 DUE TO TYPE 2 DIABETES MELLITUS: ICD-10-CM

## 2017-03-08 DIAGNOSIS — L76.34 POSTPROCEDURAL SEROMA OF SKIN AND SUBCUTANEOUS TISSUE FOLLOWING OTHER PROCEDURE: ICD-10-CM

## 2017-03-08 DIAGNOSIS — L02.211 ABDOMINAL WALL ABSCESS: ICD-10-CM

## 2017-03-08 DIAGNOSIS — E78.5 HYPERLIPIDEMIA ASSOCIATED WITH TYPE 2 DIABETES MELLITUS: ICD-10-CM

## 2017-03-08 DIAGNOSIS — I10 ESSENTIAL HYPERTENSION: ICD-10-CM

## 2017-03-08 PROCEDURE — 99214 OFFICE O/P EST MOD 30 MIN: CPT | Mod: S$GLB,,, | Performed by: FAMILY MEDICINE

## 2017-03-08 PROCEDURE — 1160F RVW MEDS BY RX/DR IN RCRD: CPT | Mod: S$GLB,,, | Performed by: FAMILY MEDICINE

## 2017-03-08 PROCEDURE — 99204 OFFICE O/P NEW MOD 45 MIN: CPT | Mod: S$GLB,,, | Performed by: INTERNAL MEDICINE

## 2017-03-08 PROCEDURE — 1160F RVW MEDS BY RX/DR IN RCRD: CPT | Mod: S$GLB,,, | Performed by: INTERNAL MEDICINE

## 2017-03-08 PROCEDURE — 3080F DIAST BP >= 90 MM HG: CPT | Mod: S$GLB,,, | Performed by: INTERNAL MEDICINE

## 2017-03-08 PROCEDURE — 99999 PR PBB SHADOW E&M-EST. PATIENT-LVL III: CPT | Mod: PBBFAC,,, | Performed by: FAMILY MEDICINE

## 2017-03-08 PROCEDURE — 3074F SYST BP LT 130 MM HG: CPT | Mod: S$GLB,,, | Performed by: FAMILY MEDICINE

## 2017-03-08 PROCEDURE — 3045F PR MOST RECENT HEMOGLOBIN A1C LEVEL 7.0-9.0%: CPT | Mod: S$GLB,,, | Performed by: FAMILY MEDICINE

## 2017-03-08 PROCEDURE — 4010F ACE/ARB THERAPY RXD/TAKEN: CPT | Mod: S$GLB,,, | Performed by: INTERNAL MEDICINE

## 2017-03-08 PROCEDURE — 3074F SYST BP LT 130 MM HG: CPT | Mod: S$GLB,,, | Performed by: INTERNAL MEDICINE

## 2017-03-08 PROCEDURE — 99999 PR PBB SHADOW E&M-EST. PATIENT-LVL III: CPT | Mod: PBBFAC,,, | Performed by: INTERNAL MEDICINE

## 2017-03-08 PROCEDURE — 3045F PR MOST RECENT HEMOGLOBIN A1C LEVEL 7.0-9.0%: CPT | Mod: S$GLB,,, | Performed by: INTERNAL MEDICINE

## 2017-03-08 PROCEDURE — 2022F DILAT RTA XM EVC RTNOPTHY: CPT | Mod: S$GLB,,, | Performed by: FAMILY MEDICINE

## 2017-03-08 PROCEDURE — 3078F DIAST BP <80 MM HG: CPT | Mod: S$GLB,,, | Performed by: FAMILY MEDICINE

## 2017-03-08 PROCEDURE — 4010F ACE/ARB THERAPY RXD/TAKEN: CPT | Mod: S$GLB,,, | Performed by: FAMILY MEDICINE

## 2017-03-08 RX ORDER — LORATADINE 10 MG/1
10 TABLET ORAL DAILY
Refills: 0 | COMMUNITY
Start: 2017-03-08

## 2017-03-08 RX ORDER — FLUTICASONE PROPIONATE 50 MCG
1 SPRAY, SUSPENSION (ML) NASAL DAILY
Qty: 1 BOTTLE | Refills: 3 | Status: SHIPPED | OUTPATIENT
Start: 2017-03-08 | End: 2017-05-17 | Stop reason: SDUPTHER

## 2017-03-08 RX ORDER — AMOXICILLIN 500 MG/1
500 TABLET, FILM COATED ORAL EVERY 12 HOURS
Qty: 20 TABLET | Refills: 0 | Status: SHIPPED | OUTPATIENT
Start: 2017-03-08 | End: 2017-03-18

## 2017-03-08 NOTE — LETTER
March 8, 2017      Beto Garcia MD  [address] [place] [address]           [facility]  [address] [place] [address]  Phone: [phone]  Fax: [phone]          Patient: Clarissa Turner   MR Number: [id number]   YOB: 1970   Date of Visit: 3/8/2017       Dear Dr. Beto Garcia:    Thank you for referring Clarissa Turner to me for evaluation. Attached you will find relevant portions of my assessment and plan of care.    If you have questions, please do not hesitate to call me. I look forward to following Clarissa Turner along with you.    Sincerely,    Luzmaria Quiñones MD    Enclosure  CC:  No Recipients    If you would like to receive this communication electronically, please contact [email or web address] or [phone] to request more information on [employer] access.    For providers and/or their staff who would like to refer a patient to [facility], please contact us through our one-stop-shop provider referral line, [facility], at [phone].    If you feel you have received this communication in error or would no longer like to receive these types of communications, please e-mail [email or web address]

## 2017-03-08 NOTE — MR AVS SNAPSHOT
[facility] - Internal Medicine  [address] [facility] [address]  [place] [address]  Phone: [phone]  Fax: [phone]                  Clarissa Turner   3/8/2017 11:00 AM   Office Visit    Description:  Female : 1970   Provider:  Beto Garcia MD   Department:  [facility] - Internal Medicine           Reason for Visit     Follow-up           Diagnoses this Visit        Comments    Maxillary sinusitis, unspecified chronicity    -  Primary Amoxicillin with flonase and claritin    Essential hypertension     BP good for now. Continue on the losartan    Uncontrolled type 2 diabetes mellitus without complication, with long-term current use of insulin     Pt last HgA1C was 7.4 and will continue with current meds and recheck in 2 months           To Do List           Future Appointments        Provider Department Dept Phone    3/9/2017 1:20 PM LABORATORY, [address] [facility] [phone]    3/9/2017 1:40 PM Domenico Arreguin MD [facility] Hematology Oncology [phone]    3/15/2017 8:00 AM ECHOCARDIOGRAM, [facility] -Cardiology [phone]    3/30/2017 9:00 AM Isaura Good Jr., PA-C [facility] - Diabetes Management [phone]    2017 8:00 AM Delano Campbell MD [facility] Pulmonary Services [phone]      Goals (5 Years of Data)     None       These Medications        Disp Refills Start End    fluticasone (FLONASE) 50 mcg/actuation nasal spray 1 Bottle 3 3/8/2017     1 spray by Each Nare route once daily. - Each Nare    Pharmacy: [email or web address] [id number] [place] [address] [place] [address] AT [facility] Ph #: [phone]       amoxicillin (AMOXIL) 500 MG Tab 20 tablet 0 3/8/2017 3/18/2017    Take 1 tablet (500 mg total) by mouth every 12 (twelve) hours. - Oral    Pharmacy: [facility]  [facility] [address] AT [facility] Ph #: [phone]         PURCHASE these Medications (No prescription required)         Start End    loratadine (CLARITIN) 10 mg tablet 3/8/2017 3/8/2018    Sig - Route: Take 1 tablet (10 mg total) by mouth once daily. - Oral    Class: OTC      [facility] On [facility]     [facility] -  Assistance  Registered nurses in the [facility] provide clinical advisement, health education, appointment booking, and other advisory services.  Call for this free service at [phone].             Medications           Message regarding Medications     Verify the changes and/or additions to your medication regime listed below are the same as discussed with your clinician today.  If any of these changes or additions are incorrect, please notify your healthcare provider.        START taking these NEW medications        Refills    fluticasone (FLONASE) 50 mcg/actuation nasal spray 3    Si spray by Each Nare route once daily.    Class: Normal    Route: Each Nare    loratadine (CLARITIN) 10 mg tablet 0    Sig: Take 1 tablet (10 mg total) by mouth once daily.    Class: OTC    Route: Oral    amoxicillin (AMOXIL) 500 MG Tab 0    Sig: Take 1 tablet (500 mg total) by mouth every 12 (twelve) hours.    Class: Normal    Route: Oral      STOP taking these medications     sulfamethoxazole-trimethoprim 800-160mg (BACTRIM DS) 800-160 mg Tab Take 1 tablet by mouth 2 (two) times daily.           Verify that the below list of medications is an accurate representation of the medications you are currently taking.  If none reported, the list may be blank. If incorrect, please contact your healthcare provider. Carry this list with you in case of emergency.           Current Medications     acetaminophen (TYLENOL) 500 MG tablet Take 1,000 mg by mouth every 6 (six) hours as needed for Pain.    atorvastatin (LIPITOR) 80 MG tablet TAKE ONE TABLET BY MOUTH ONCE DAILY    blood sugar diagnostic Strp Use to check blood sugars twice daily.    blood-glucose meter (ONETOUCH VERIO IQ METER) kit Use as instructed     "DOCUSATE CALCIUM (STOOL SOFTENER ORAL) Take 2 tablets by mouth as needed.     gabapentin (NEURONTIN) 300 MG capsule Take 600 mg by mouth every evening.     glipiZIDE (GLUCOTROL) 5 MG tablet Take 1 tablet (5 mg total) by mouth daily with breakfast.    HUMALOG KWIKPEN 100 unit/mL InPn pen Sliding Scale:    150-200- 4 units  201-250= 6 unit  251-300= 8 units  301 greater 10 units and caused    insulin glargine (LANTUS SOLOSTAR) 100 unit/mL (3 mL) InPn pen Inject 30 Units into the skin every evening.    lancets [facility] Use to check blood sugars twice daily.    levothyroxine (SYNTHROID) 75 MCG tablet Take 1 tablet (75 mcg total) by mouth once daily. Avoid calcium, iron, food, or fiber within 1 hour of taking medication.    lidocaine (LIDODERM) 5 % APPLY 1 PATCH FOR 12 HOURS THEN 12 HOURS OFF PRN P    losartan (COZAAR) 25 MG tablet Take 1 tablet (25 mg total) by mouth once daily.    metformin (GLUCOPHAGE) 500 MG tablet Take 1 tablet (500 mg total) by mouth 2 (two) times daily with meals.    pantoprazole (PROTONIX) 40 MG tablet Take 40 mg by mouth once daily.    pen needle, diabetic (SURE-FINE PEN NEEDLES) 31 gauge x 3/16" Ndle 1 each by Misc.(Non-Drug; Combo Route) route 4 (four) times daily with meals and nightly.    trazodone (DESYREL) 100 MG tablet Take 1 tablet (100 mg total) by mouth every evening.    warfarin (COUMADIN) 6 MG tablet Take 2 tablets daily as directed by the coumadin clinic.    amoxicillin (AMOXIL) 500 MG Tab Take 1 tablet (500 mg total) by mouth every 12 (twelve) hours.    fluticasone (FLONASE) 50 mcg/actuation nasal spray 1 spray by Each Nare route once daily.    loratadine (CLARITIN) 10 mg tablet Take 1 tablet (10 mg total) by mouth once daily.           Clinical Reference Information           Your Vitals Were     BP Pulse Temp Height    118/78 (BP Location: Right arm, Patient Position: Sitting, BP Method: Manual) 125 97.9 °F (36.6 °C) (Tympanic) 5' 5" (1.651 m)    Weight Last Period BMI    128 kg (282 " lb 3 oz) 04/14/2015 (Exact Date) 46.96 kg/m2      Blood Pressure          Most Recent Value    BP  118/78      Allergies as of 3/8/2017     Ace Inhibitors      Immunizations Administered on Date of Encounter - 3/8/2017     None      Language Assistance Services     ATTENTION: Language assistance services are available, free of charge. Please call [phone].      ATENCIÓN: Si habla español, tiene a cordoba disposición servicios gratuitos de asistencia lingüística. Llame al [phone].     CHÚ Ý: N?u b?n nói Ti?ng Vi?t, có các d?ch v? h? tr? ngôn ng? mi?n phí dành cho b?n. G?i s? [phone].         [facility] - [facility] complies with applicable Federal civil rights laws and does not discriminate on the basis of race, color, national origin, age, disability, or sex.

## 2017-03-08 NOTE — MR AVS SNAPSHOT
[facility] - Cardiology  [address] [facility] [address] [place] [address]  Phone: [phone]  Fax: [phone]                  Clarissa Turner   3/8/2017 9:30 AM   Office Visit    Description:  Female : 1970   Provider:  Luzmaria Quiñones MD   Department:  [facility] - Cardiology           Reason for Visit     Tachycardia           Diagnoses this Visit        Comments    Essential hypertension    -  Primary     Diabetes mellitus type 2 in obese         History of pulmonary embolism         Hyperlipidemia with target LDL less than 100         Primary thrombophilia         Abdominal wall abscess         Anticoagulation monitoring, INR range 2-3         Postprocedural seroma of skin and subcutaneous tissue following other procedure         Deep vein thrombosis (DVT) of popliteal vein of right lower extremity, unspecified chronicity         Acute deep vein thrombosis (DVT) of popliteal vein of left lower extremity         Pulmonary nodule, right         Uncontrolled type 2 diabetes mellitus without complication, with long-term current use of insulin         Hyperlipidemia associated with type 2 diabetes mellitus         CKD stage 3 due to type 2 diabetes mellitus         Left lower quadrant pain         Diarrhea, unspecified type                To Do List           Future Appointments        Provider Department Dept Phone    3/8/2017 11:00 AM Beto Garcia MD [facility] - Internal Medicine [phone]    3/9/2017 1:20 PM LABORATORY, [address] [facility] [phone]    3/9/2017 1:40 PM Domenico Arreguin MD [facility] - Hematology Oncology [phone]    3/30/2017 9:00 AM Isaura Good Jr., PA-C [facility] - Diabetes Management [phone]      Goals (5 Years of Data)     None      Follow-Up and Disposition     Return if symptoms worsen or fail to improve.      [facility]     [facility] -  Assistance  Registered nurses in the [facility] provide clinical  advisement, health education, appointment booking, and other advisory services.  Call for this free service at [phone].             Medications           Message regarding Medications     Verify the changes and/or additions to your medication regime listed below are the same as discussed with your clinician today.  If any of these changes or additions are incorrect, please notify your healthcare provider.        STOP taking these medications     fluconazole (DIFLUCAN) 150 MG Tab Take 150 mg by mouth once daily.    oxycodone (OXYCONTIN) 10 mg 12 hr tablet Take 10 mg by mouth every 12 (twelve) hours as needed for Pain.    esomeprazole (NEXIUM) 40 MG capsule Take 1 capsule (40 mg total) by mouth before breakfast.    zolpidem (AMBIEN) 10 mg Tab Take 5 mg by mouth nightly as needed.           Verify that the below list of medications is an accurate representation of the medications you are currently taking.  If none reported, the list may be blank. If incorrect, please contact your healthcare provider. Carry this list with you in case of emergency.           Current Medications     acetaminophen (TYLENOL) 500 MG tablet Take 1,000 mg by mouth every 6 (six) hours as needed for Pain.    atorvastatin (LIPITOR) 80 MG tablet TAKE ONE TABLET BY MOUTH ONCE DAILY    blood sugar diagnostic Strp Use to check blood sugars twice daily.    blood-glucose meter (ONETOUCH VERIO IQ METER) kit Use as instructed    DOCUSATE CALCIUM (STOOL SOFTENER ORAL) Take 2 tablets by mouth as needed.     gabapentin (NEURONTIN) 300 MG capsule Take 600 mg by mouth every evening.     glipiZIDE (GLUCOTROL) 5 MG tablet Take 1 tablet (5 mg total) by mouth daily with breakfast.    HUMALOG KWIKPEN 100 unit/mL InPn pen Sliding Scale:    150-200- 4 units  201-250= 6 unit  251-300= 8 units  301 greater 10 units and caused    insulin glargine (LANTUS SOLOSTAR) 100 unit/mL (3 mL) InPn pen Inject 30 Units into the skin every evening.    lancets Misc Use to  "check blood sugars twice daily.    levothyroxine (SYNTHROID) 75 MCG tablet Take 1 tablet (75 mcg total) by mouth once daily. Avoid calcium, iron, food, or fiber within 1 hour of taking medication.    lidocaine (LIDODERM) 5 % APPLY 1 PATCH FOR 12 HOURS THEN 12 HOURS OFF PRN P    losartan (COZAAR) 25 MG tablet Take 1 tablet (25 mg total) by mouth once daily.    metformin (GLUCOPHAGE) 500 MG tablet Take 1 tablet (500 mg total) by mouth 2 (two) times daily with meals.    pantoprazole (PROTONIX) 40 MG tablet Take 40 mg by mouth once daily.    pen needle, diabetic (SURE-FINE PEN NEEDLES) 31 gauge x 3/16" Ndle 1 each by Misc.(Non-Drug; Combo Route) route 4 (four) times daily with meals and nightly.    sulfamethoxazole-trimethoprim 800-160mg (BACTRIM DS) 800-160 mg Tab Take 1 tablet by mouth 2 (two) times daily.    trazodone (DESYREL) 100 MG tablet Take 1 tablet (100 mg total) by mouth every evening.    warfarin (COUMADIN) 6 MG tablet Take 2 tablets daily as directed by the coumadin clinic.           Clinical Reference Information           Your Vitals Were     BP Pulse Height Weight Last Period BMI    122/96 (BP Location: Left arm, Patient Position: Sitting) 104 5' 5" (1.651 m) 128.3 kg (282 lb 13.6 oz) 04/14/2015 (Exact Date) 47.07 kg/m2      Blood Pressure          Most Recent Value    Right Arm BP - Sitting  128/92    Left Arm BP - Sitting  122/96    BP  (!)  122/96      Allergies as of 3/8/2017     Ace Inhibitors      Immunizations Administered on Date of Encounter - 3/8/2017     None      Orders Placed During Today's Visit      Normal Orders This Visit    Ambulatory consult for Sleep Study     Future Labs/Procedures Expected by Expires    2D echo with color flow doppler  As directed 3/8/2018      Language Assistance Services     ATTENTION: Language assistance services are available, free of charge. Please call [phone].      ATENCIÓN: Si habla estephania, tiene a cordoba disposición servicios gratuitos de asistencia " lingüística. Kiera al [phone].     JULY Ý: N?u b?n nói Ti?ng Vi?t, có các d?ch v? h? tr? ngôn ng? mi?n phí dành cho b?n. G?i s? [phone].         [facility] - Cardiology complies with applicable Federal civil rights laws and does not discriminate on the basis of race, color, national origin, age, disability, or sex.

## 2017-03-08 NOTE — PROGRESS NOTES
Subjective:       Patient ID: Clarissa Turner is a 47 y.o. female.    Chief Complaint: Follow-up    HPI Comments: F/U:       Pt is a 47 year old who BP is doing better. Pt did see cardiology who will do work-up but may be sleep apnea.       Sinsus Congestion:       Pt is having some sinus pressure with sinus headaches and watery eyes. Pt reports sx have been occurring about 2 weeks ago.     Review of Systems   Constitutional: Negative.    HENT: Positive for postnasal drip, rhinorrhea and sinus pressure.    Respiratory: Negative.  Negative for cough.    Cardiovascular: Negative.    Genitourinary: Negative.    Neurological: Negative.    Hematological: Negative.        Objective:      Physical Exam   Constitutional: She appears well-developed and well-nourished.   HENT:   Right Ear: Tympanic membrane is erythematous. A middle ear effusion is present.   Left Ear: Tympanic membrane is erythematous.   Nose: Mucosal edema and rhinorrhea present. Right sinus exhibits maxillary sinus tenderness. Right sinus exhibits no frontal sinus tenderness. Left sinus exhibits maxillary sinus tenderness. Left sinus exhibits no frontal sinus tenderness.   Cardiovascular: Normal rate and regular rhythm.    Pulmonary/Chest: Effort normal and breath sounds normal.       Assessment:       1. Maxillary sinusitis, unspecified chronicity    2. Essential hypertension    3. Uncontrolled type 2 diabetes mellitus without complication, with long-term current use of insulin        Plan:       Maxillary sinusitis, unspecified chronicity  Comments:  Amoxicillin with flonase and claritin  Orders:  -     amoxicillin (AMOXIL) 500 MG Tab; Take 1 tablet (500 mg total) by mouth every 12 (twelve) hours.  Dispense: 20 tablet; Refill: 0    Essential hypertension  Comments:  BP good for now. Continue on the losartan    Uncontrolled type 2 diabetes mellitus without complication, with long-term current use of insulin  Comments:  Pt last HgA1C was 7.4 and  will continue with current meds and recheck in 2 months    Other orders  -     fluticasone (FLONASE) 50 mcg/actuation nasal spray; 1 spray by Each Nare route once daily.  Dispense: 1 Bottle; Refill: 3  -     loratadine (CLARITIN) 10 mg tablet; Take 1 tablet (10 mg total) by mouth once daily.; Refill: 0
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
## 2017-03-09 ENCOUNTER — OFFICE VISIT (OUTPATIENT)
Dept: HEMATOLOGY/ONCOLOGY | Facility: CLINIC | Age: 47
End: 2017-03-09
Payer: COMMERCIAL

## 2017-03-09 ENCOUNTER — LAB VISIT (OUTPATIENT)
Dept: LAB | Facility: HOSPITAL | Age: 47
End: 2017-03-09
Attending: INTERNAL MEDICINE
Payer: COMMERCIAL

## 2017-03-09 VITALS
WEIGHT: 281.5 LBS | TEMPERATURE: 98 F | HEART RATE: 108 BPM | BODY MASS INDEX: 46.9 KG/M2 | DIASTOLIC BLOOD PRESSURE: 80 MMHG | OXYGEN SATURATION: 96 % | SYSTOLIC BLOOD PRESSURE: 120 MMHG | HEIGHT: 65 IN

## 2017-03-09 DIAGNOSIS — Z79.4 CONTROLLED TYPE 2 DIABETES MELLITUS WITHOUT COMPLICATION, WITH LONG-TERM CURRENT USE OF INSULIN: ICD-10-CM

## 2017-03-09 DIAGNOSIS — Z86.711 HISTORY OF PULMONARY EMBOLISM: ICD-10-CM

## 2017-03-09 DIAGNOSIS — E11.9 CONTROLLED TYPE 2 DIABETES MELLITUS WITHOUT COMPLICATION, WITH LONG-TERM CURRENT USE OF INSULIN: ICD-10-CM

## 2017-03-09 DIAGNOSIS — I82.432 ACUTE DEEP VEIN THROMBOSIS (DVT) OF POPLITEAL VEIN OF LEFT LOWER EXTREMITY: Primary | ICD-10-CM

## 2017-03-09 DIAGNOSIS — Z79.01 ANTICOAGULATION MONITORING, INR RANGE 2-3: ICD-10-CM

## 2017-03-09 LAB
INR PPP: 2.6
PROTHROMBIN TIME: 26.2 SEC

## 2017-03-09 PROCEDURE — 85610 PROTHROMBIN TIME: CPT

## 2017-03-09 PROCEDURE — 99213 OFFICE O/P EST LOW 20 MIN: CPT | Mod: S$GLB,,, | Performed by: INTERNAL MEDICINE

## 2017-03-09 PROCEDURE — 36415 COLL VENOUS BLD VENIPUNCTURE: CPT

## 2017-03-09 PROCEDURE — 3045F PR MOST RECENT HEMOGLOBIN A1C LEVEL 7.0-9.0%: CPT | Mod: S$GLB,,, | Performed by: INTERNAL MEDICINE

## 2017-03-09 PROCEDURE — 4010F ACE/ARB THERAPY RXD/TAKEN: CPT | Mod: S$GLB,,, | Performed by: INTERNAL MEDICINE

## 2017-03-09 PROCEDURE — 3079F DIAST BP 80-89 MM HG: CPT | Mod: S$GLB,,, | Performed by: INTERNAL MEDICINE

## 2017-03-09 PROCEDURE — 1160F RVW MEDS BY RX/DR IN RCRD: CPT | Mod: S$GLB,,, | Performed by: INTERNAL MEDICINE

## 2017-03-09 PROCEDURE — 99999 PR PBB SHADOW E&M-EST. PATIENT-LVL IV: CPT | Mod: PBBFAC,,, | Performed by: INTERNAL MEDICINE

## 2017-03-09 PROCEDURE — 3074F SYST BP LT 130 MM HG: CPT | Mod: S$GLB,,, | Performed by: INTERNAL MEDICINE

## 2017-03-09 NOTE — PROGRESS NOTES
Subjective:       Patient ID: Clarissa Turner is a 47 y.o. female.    Chief Complaint: No chief complaint on file.    HPI  This is a 46-year-old  lady who comes for follow up of her anticoagulation.  She is   known to us because of a history of recurrent DVTs and pulmonary   embolism. The patient has had several episodes of DVT in the past. In 2004,   she had a DVT in the left leg after she had left hip replacement. She was not   given anticoagulation. She had another one after another surgery a few months   later and she was placed on Coumadin for three years. The patient also says   that she had a pulmonary embolism in 2012. She was advised to be on Coumadin   for the rest of her life. She was placed on Coumadin in 2012, has been followed  by the Coumadin Clinic at our institution. She was in the therapeutic range   with 20 mg of Coumadin a day.  Because of the patient's work, it was difficult for her to come to have her INR   checked in the Coumadin Clinic and eventually, her Coumadin was changed to   Xarelto, which she took until recently.  She became concerned about about potential problems  with Xarelto and asked to go back to Coumadin.      She underwent a left knee surgery in mid 2016 . She was bridged with Lovenox.  She developed a left knee hemarthrosis and her anticoagulation was held for a few days with the development of a left popliteal DVT.  She was admitted, and anticoagulation restarted.        She developed a hematoma following a hernia repar in December 2016. The wound beame infected and she had a prolonged stay in the Hospital from 12/2016 to 2/107.  She comes for follow up of her anticoagulation. She is on coumadin 16 mg a day  .ALLERGIES: The patient is allergic to ACE inhibitors, that makes her cough.  MEDICATIONS: See MedCard.  PREVIOUS SURGERIES: Bilateral hip replacement. Revision of the left hip in   August 2014. A gallbladder removal.  SOCIAL HISTORY: She is   with one daughter. She lives in [place].   She does not smoke or drink. 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 Maternal grandmother, mother, father and paternal   grandmother had diabetes. Mother and grandmother had congestive heart failure.  PAST MEDICAL HISTORY:  1. Status post bilateral hip replacement.  2. History of recurrent DVT/PE.  3. Reflux.  4. High blood pressure.  5. Diabetes mellitus.  6. Arthritis of knees and hips.  7. Obesity.  Review of Systems   Constitutional: Negative.    HENT: Negative.    Eyes: Negative.    Respiratory: Negative.  Negative for cough and wheezing.    Cardiovascular: Negative.  Negative for chest pain.   Gastrointestinal: Negative.    Genitourinary: Negative.    Neurological: Negative.    Psychiatric/Behavioral: Negative.        Objective:      Physical Exam   Constitutional: She is oriented to person, place, and time. She appears well-developed. No distress.   HENT:   Head: Normocephalic.   Right Ear: Tympanic membrane, external ear and ear canal normal.   Left Ear: Tympanic membrane, external ear and ear canal normal.   Nose: Nose normal. Right sinus exhibits no maxillary sinus tenderness and no frontal sinus tenderness. Left sinus exhibits no maxillary sinus tenderness and no frontal sinus tenderness.   Mouth/Throat: Oropharynx is clear and moist and mucous membranes are normal.   Teeth normal.  Gums normal.   Eyes: Conjunctivae and lids are normal. Pupils are equal, round, and reactive to light.   Neck: Normal carotid pulses, no hepatojugular reflux and no JVD present. Carotid bruit is not present. No tracheal deviation present. No thyroid mass and no thyromegaly present.   Cardiovascular: Normal rate, regular rhythm, S1 normal, S2 normal, normal heart sounds and intact distal pulses.  Exam reveals no gallop and no friction rub.    No murmur heard.  Carotid exam  normal   Pulmonary/Chest: Effort normal and breath sounds normal. No accessory muscle usage. No respiratory distress. She has no wheezes. She has no rales. She exhibits no tenderness.   Abdominal: Soft. Normal appearance. She exhibits no distension and no mass. There is no splenomegaly or hepatomegaly. There is no tenderness. There is no rebound and no guarding.   Musculoskeletal: Normal range of motion. She exhibits no edema or tenderness.        Right hand: Normal.        Left hand: Normal.       Lymphadenopathy:     She has no cervical adenopathy.     She has no axillary adenopathy.        Right: No inguinal and no supraclavicular adenopathy present.        Left: No inguinal and no supraclavicular adenopathy present.   Neurological: She is alert and oriented to person, place, and time. She has normal strength. No cranial nerve deficit. Coordination normal.   Skin: Skin is warm and dry. No rash noted. She is not diaphoretic. No cyanosis or erythema. No pallor. Nails show no clubbing.   Psychiatric: She has a normal mood and affect. Her behavior is normal. Judgment and thought content normal.       Wt Readings from Last 3 Encounters:   03/09/17 127.7 kg (281 lb 8.4 oz)   03/08/17 128 kg (282 lb 3 oz)   03/08/17 128.3 kg (282 lb 13.6 oz)     Temp Readings from Last 3 Encounters:   03/09/17 98.4 °F (36.9 °C) (Oral)   03/08/17 97.9 °F (36.6 °C) (Tympanic)   02/24/17 97.4 °F (36.3 °C) (Tympanic)     BP Readings from Last 3 Encounters:   03/09/17 120/80   03/08/17 118/78   03/08/17 (!) 122/96     Pulse Readings from Last 3 Encounters:   03/09/17 108   03/08/17 (!) 125   03/08/17 104       Assessment:       1. Acute deep vein thrombosis (DVT) of popliteal vein of left lower extremity    2. History of pulmonary embolism    3. Controlled type 2 diabetes mellitus without complication, with long-term current use of insulin    4. Anticoagulation monitoring, INR range 2-3        Plan:       Lab Results   Component Value Date     INR 2.6 (H) 03/09/2017    INR 2.4 02/24/2017    INR 1.3 (H) 01/11/2017       She ahs been asked to continue the same dose of coumadin. See the Coumadin clinic nurse in 3 weeks   Next visit with me open.continue diet to loose weight and for her diabetes. Follow up with PCP regarding her diabetes

## 2017-03-25 ENCOUNTER — CLINICAL SUPPORT (OUTPATIENT)
Dept: CARDIOLOGY | Facility: CLINIC | Age: 47
End: 2017-03-25
Payer: COMMERCIAL

## 2017-03-25 DIAGNOSIS — E66.9 DIABETES MELLITUS TYPE 2 IN OBESE: ICD-10-CM

## 2017-03-25 DIAGNOSIS — Z86.711 HISTORY OF PULMONARY EMBOLISM: ICD-10-CM

## 2017-03-25 DIAGNOSIS — E11.69 DIABETES MELLITUS TYPE 2 IN OBESE: ICD-10-CM

## 2017-03-25 DIAGNOSIS — I51.7 CARDIOMEGALY: ICD-10-CM

## 2017-03-25 LAB
DIASTOLIC DYSFUNCTION: NO
ESTIMATED PA SYSTOLIC PRESSURE: 19.48
RETIRED EF AND QEF - SEE NOTES: 60 (ref 55–65)

## 2017-03-25 PROCEDURE — 93306 TTE W/DOPPLER COMPLETE: CPT | Mod: S$GLB,,, | Performed by: INTERNAL MEDICINE

## 2017-03-27 ENCOUNTER — TELEPHONE (OUTPATIENT)
Dept: CARDIOLOGY | Facility: CLINIC | Age: 47
End: 2017-03-27

## 2017-03-27 NOTE — TELEPHONE ENCOUNTER
----- Message from Luzmaria Quiñones MD sent at 3/25/2017  2:52 PM CDT -----  Heart function normal

## 2017-03-30 ENCOUNTER — ANTI-COAG VISIT (OUTPATIENT)
Dept: CARDIOLOGY | Facility: CLINIC | Age: 47
End: 2017-03-30
Payer: COMMERCIAL

## 2017-03-30 DIAGNOSIS — Z79.01 LONG TERM (CURRENT) USE OF ANTICOAGULANTS: Primary | ICD-10-CM

## 2017-03-30 LAB — INR PPP: 1.5 (ref 2–3)

## 2017-03-30 PROCEDURE — 99211 OFF/OP EST MAY X REQ PHY/QHP: CPT | Mod: 25,S$GLB,,

## 2017-03-30 PROCEDURE — 85610 PROTHROMBIN TIME: CPT | Mod: QW,S$GLB,,

## 2017-03-30 NOTE — PROGRESS NOTES
INR today is sub-therapeutic. Patient confirms dose and compliance. Reports eating high vitamin k this week but no more than usual. Take 18mg tonight only then resume 12mg daily. Repeat INR in 1 week.

## 2017-03-30 NOTE — MR AVS SNAPSHOT
[facility]  [address] [facility]  [place] [address]  Phone: [phone]  Fax: [phone]                  Clarissa Turner   3/30/2017 10:45 AM   Anti-coag visit    Description:  Female : 1970   Provider:  Anuja Jeong PharmD   Department:  [facility]           Diagnoses this Visit        Comments    Long term (current) use of anticoagulants    -  Primary            To Do List           Future Appointments        Provider Department Dept Phone    3/30/2017 10:45 AM Anuja Jeong PharmD [facility] [phone]    2017 11:30 AM Isaura Good Jr., PA-C [facility] - Diabetes [facility] [phone]    2017 8:45 AM Anuja Jeong PharmD [facility] [phone]    2017 8:00 AM Delano Campbell MD Pending sale to [facility] - Pulmonary Services [phone]      Goals (5 Years of Data)     None      [facility]     [facility] -  Assistance  Unless otherwise directed by your provider, please contact [facility], our nurse care line that is available for  assistance.     Registered nurses in the [facility] provide: appointment scheduling, clinical advisement, health education, and other advisory services.  Call: [phone] (toll free)               Medications           Message regarding Medications     Verify the changes and/or additions to your medication regime listed below are the same as discussed with your clinician today.  If any of these changes or additions are incorrect, please notify your healthcare provider.             Verify that the below list of medications is an accurate representation of the medications you are currently taking.  If none reported, the list may be blank. If incorrect, please contact your healthcare provider. Carry this list with you in case of emergency.           Current Medications     acetaminophen (TYLENOL) 500 MG tablet Take 1,000 mg by mouth every 6 (six)  "hours as needed for Pain.    atorvastatin (LIPITOR) 80 MG tablet TAKE ONE TABLET BY MOUTH ONCE DAILY    blood sugar diagnostic Strp Use to check blood sugars twice daily.    blood-glucose meter (ONETOUCH VERIO IQ METER) kit Use as instructed    DOCUSATE CALCIUM (STOOL SOFTENER ORAL) Take 2 tablets by mouth as needed.     fluticasone (FLONASE) 50 mcg/actuation nasal spray 1 spray by Each Nare route once daily.    gabapentin (NEURONTIN) 300 MG capsule Take 600 mg by mouth every evening.     glipiZIDE (GLUCOTROL) 5 MG tablet Take 1 tablet (5 mg total) by mouth daily with breakfast.    HUMALOG KWIKPEN 100 unit/mL InPn pen Sliding Scale:    150-200- 4 units  201-250= 6 unit  251-300= 8 units  301 greater 10 units and caused    insulin glargine (LANTUS SOLOSTAR) 100 unit/mL (3 mL) InPn pen Inject 30 Units into the skin every evening.    lancets Misc Use to check blood sugars twice daily.    levothyroxine (SYNTHROID) 75 MCG tablet Take 1 tablet (75 mcg total) by mouth once daily. Avoid calcium, iron, food, or fiber within 1 hour of taking medication.    lidocaine (LIDODERM) 5 % APPLY 1 PATCH FOR 12 HOURS THEN 12 HOURS OFF PRN P    loratadine (CLARITIN) 10 mg tablet Take 1 tablet (10 mg total) by mouth once daily.    losartan (COZAAR) 25 MG tablet Take 1 tablet (25 mg total) by mouth once daily.    metformin (GLUCOPHAGE) 500 MG tablet Take 1 tablet (500 mg total) by mouth 2 (two) times daily with meals.    pantoprazole (PROTONIX) 40 MG tablet Take 40 mg by mouth once daily.    pen needle, diabetic (SURE-FINE PEN NEEDLES) 31 gauge x 3/16" Ndle 1 each by Misc.(Non-Drug; Combo Route) route 4 (four) times daily with meals and nightly.    trazodone (DESYREL) 100 MG tablet Take 1 tablet (100 mg total) by mouth every evening.    warfarin (COUMADIN) 6 MG tablet Take 2 tablets daily as directed by the coumadin clinic.           Clinical Reference Information           Your Vitals Were     Last Period                   04/14/2015 " (Exact Date)           Allergies as of 3/30/2017     Ace Inhibitors      Immunizations Administered on Date of Encounter - 3/30/2017     None      Orders Placed During Today's Visit      Normal Orders This Visit    POCT INR          3/30/2017  9:26 AM - Bhumi BansalD      Component Results     Component Value Flag Ref Range Units Status    INR 1.5 (A) 2.0 - 3.0  Final      February 2017 Details    Sun Mon Tue Wed Thu Fri Sat        1               2               3               4                 5               6               7               8               9               10               11                 12               13               14               15               16               17               18                 19               20               21               22               23               24   2.4   12 mg   See details      25      12 mg           26      12 mg         27      12 mg         28      12 mg              Date Details   02/24 Last INR check   INR: 2.4                 March 2017 Details    Sun Mon Tue Wed Thu Fri Sat        1      12 mg         2      12 mg         3      12 mg         4      12 mg           5      12 mg         6      12 mg         7      12 mg         8      12 mg         9   2.6   12 mg   See details      10      12 mg         11      12 mg           12      12 mg         13      12 mg         14      12 mg         15      12 mg         16      12 mg         17      12 mg         18      12 mg           19      12 mg         20      12 mg         21      12 mg         22      12 mg         23      12 mg         24      12 mg         25      12 mg           26      12 mg         27      12 mg         28      12 mg         29      12 mg         30   1.5   18 mg   See details      31      12 mg           Date Details   03/09 INR: 2.6   Coumadin Therapy: 2.0 - 3.0 for INR for all indicators except mechanical heart valves and antiphospholipid syndromes  which should use 2.5 - 3.5.       03/30 This INR check   INR: 1.5                     How to take your warfarin dose     To take:  12 mg Take 2 of the 6 mg tablets.    To take:  18 mg Take 3 of the 6 mg tablets.           April 2017 Details    Sun Mon Tue Wed Thu Fri Sat           1      12 mg           2      12 mg         3      12 mg         4      12 mg         5      12 mg         6            7               8                 9               10               11               12               13               14               15                 16               17               18               19               20               21               22                 23               24               25               26               27               28               29                 30                      Date Details   No additional details    Date of next INR:  4/6/2017         How to take your warfarin dose     To take:  12 mg Take 2 of the 6 mg tablets.           Anticoagulation Summary as of 3/30/2017     Maintenance plan 12 mg (6 mg x 2) every day    Full instructions 3/30: 18 mg; Otherwise 12 mg every day    Next INR check 4/6/2017      Anticoagulation Episode Summary     Comments       Patient Findings     Negatives Signs/symptoms of thrombosis, Signs/symptoms of bleeding, Laboratory test error suspected, Change in health, Change in alcohol use, Change in activity, Upcoming invasive procedure, Emergency department visit, Upcoming dental procedure, Missed doses, Extra doses, Change in medications, Change in diet/appetite, Hospital admission, Bruising, Other complaints      Language Assistance Services     ATTENTION: Language assistance services are available, free of charge. Please call [phone].      ATENCIÓN: Si audie best, tiene a cordoba disposición servicios gratuitos de asistencia lingüística. Llame al [phone].     CHÚ Ý: N?u b?n nói Ti?ng Vi?t, có các d?ch v? h? tr? ngôn ng? mi?n phí  dành cho b?n. G?i s? [phone].         [facility] complies with applicable Federal civil rights laws and does not discriminate on the basis of race, color, national origin, age, disability, or sex.

## 2017-03-31 DIAGNOSIS — E66.9 DIABETES MELLITUS TYPE 2 IN OBESE: ICD-10-CM

## 2017-03-31 DIAGNOSIS — E11.69 DIABETES MELLITUS TYPE 2 IN OBESE: ICD-10-CM

## 2017-03-31 DIAGNOSIS — J40 SINOBRONCHITIS: ICD-10-CM

## 2017-03-31 DIAGNOSIS — J32.9 SINOBRONCHITIS: ICD-10-CM

## 2017-03-31 NOTE — TELEPHONE ENCOUNTER
----- Message from Adrianna Marroquin sent at 3/31/2017  8:18 AM CDT -----  Contact: Ezra  Patient is requesting a refill on Metformin-Chiquis on [address]. Please call patient back if needed at [phone]. Thank you

## 2017-04-01 RX ORDER — METFORMIN HYDROCHLORIDE 500 MG/1
TABLET, EXTENDED RELEASE ORAL
Qty: 60 TABLET | Refills: 0 | Status: SHIPPED | OUTPATIENT
Start: 2017-04-01 | End: 2017-05-18 | Stop reason: SDUPTHER

## 2017-04-01 RX ORDER — METFORMIN HYDROCHLORIDE 500 MG/1
500 TABLET ORAL 2 TIMES DAILY WITH MEALS
Qty: 60 TABLET | Refills: 5 | Status: SHIPPED | OUTPATIENT
Start: 2017-04-01 | End: 2017-04-01

## 2017-04-04 ENCOUNTER — LAB VISIT (OUTPATIENT)
Dept: LAB | Facility: HOSPITAL | Age: 47
End: 2017-04-04
Attending: PEDIATRICS
Payer: COMMERCIAL

## 2017-04-04 ENCOUNTER — OFFICE VISIT (OUTPATIENT)
Dept: DIABETES | Facility: CLINIC | Age: 47
End: 2017-04-04
Payer: COMMERCIAL

## 2017-04-04 ENCOUNTER — OFFICE VISIT (OUTPATIENT)
Dept: OPHTHALMOLOGY | Facility: CLINIC | Age: 47
End: 2017-04-04
Payer: COMMERCIAL

## 2017-04-04 VITALS
BODY MASS INDEX: 48.82 KG/M2 | SYSTOLIC BLOOD PRESSURE: 122 MMHG | HEIGHT: 65 IN | WEIGHT: 293 LBS | DIASTOLIC BLOOD PRESSURE: 76 MMHG

## 2017-04-04 DIAGNOSIS — E11.65 UNCONTROLLED TYPE 2 DIABETES MELLITUS WITH HYPERGLYCEMIA, WITH LONG-TERM CURRENT USE OF INSULIN: Primary | ICD-10-CM

## 2017-04-04 DIAGNOSIS — E11.65 UNCONTROLLED TYPE 2 DIABETES MELLITUS WITH HYPERGLYCEMIA, WITH LONG-TERM CURRENT USE OF INSULIN: ICD-10-CM

## 2017-04-04 DIAGNOSIS — Z79.4 UNCONTROLLED TYPE 2 DIABETES MELLITUS WITH HYPERGLYCEMIA, WITH LONG-TERM CURRENT USE OF INSULIN: ICD-10-CM

## 2017-04-04 DIAGNOSIS — H10.30 ACUTE CONJUNCTIVITIS, UNSPECIFIED: Primary | ICD-10-CM

## 2017-04-04 DIAGNOSIS — Z79.4 UNCONTROLLED TYPE 2 DIABETES MELLITUS WITH HYPERGLYCEMIA, WITH LONG-TERM CURRENT USE OF INSULIN: Primary | ICD-10-CM

## 2017-04-04 LAB
BASOPHILS # BLD AUTO: 0.01 K/UL
BASOPHILS NFR BLD: 0.2 %
DIFFERENTIAL METHOD: ABNORMAL
EOSINOPHIL # BLD AUTO: 0.1 K/UL
EOSINOPHIL NFR BLD: 0.9 %
ERYTHROCYTE [DISTWIDTH] IN BLOOD BY AUTOMATED COUNT: 16.1 %
GLUCOSE SERPL-MCNC: 139 MG/DL (ref 70–110)
HCT VFR BLD AUTO: 35.1 %
HGB BLD-MCNC: 11.2 G/DL
LYMPHOCYTES # BLD AUTO: 2.1 K/UL
LYMPHOCYTES NFR BLD: 35.9 %
MCH RBC QN AUTO: 26.8 PG
MCHC RBC AUTO-ENTMCNC: 31.9 %
MCV RBC AUTO: 84 FL
MONOCYTES # BLD AUTO: 0.4 K/UL
MONOCYTES NFR BLD: 6.6 %
NEUTROPHILS # BLD AUTO: 3.3 K/UL
NEUTROPHILS NFR BLD: 56.2 %
PLATELET # BLD AUTO: 390 K/UL
PMV BLD AUTO: 10.4 FL
RBC # BLD AUTO: 4.18 M/UL
WBC # BLD AUTO: 5.77 K/UL

## 2017-04-04 PROCEDURE — 3078F DIAST BP <80 MM HG: CPT | Mod: S$GLB,,, | Performed by: PHYSICIAN ASSISTANT

## 2017-04-04 PROCEDURE — 3045F PR MOST RECENT HEMOGLOBIN A1C LEVEL 7.0-9.0%: CPT | Mod: S$GLB,,, | Performed by: PHYSICIAN ASSISTANT

## 2017-04-04 PROCEDURE — 99999 PR PBB SHADOW E&M-EST. PATIENT-LVL IV: CPT | Mod: PBBFAC,,, | Performed by: PHYSICIAN ASSISTANT

## 2017-04-04 PROCEDURE — 4010F ACE/ARB THERAPY RXD/TAKEN: CPT | Mod: S$GLB,,, | Performed by: PHYSICIAN ASSISTANT

## 2017-04-04 PROCEDURE — 85025 COMPLETE CBC W/AUTO DIFF WBC: CPT

## 2017-04-04 PROCEDURE — 83036 HEMOGLOBIN GLYCOSYLATED A1C: CPT

## 2017-04-04 PROCEDURE — 80061 LIPID PANEL: CPT

## 2017-04-04 PROCEDURE — 82948 REAGENT STRIP/BLOOD GLUCOSE: CPT | Mod: S$GLB,,, | Performed by: PHYSICIAN ASSISTANT

## 2017-04-04 PROCEDURE — 80069 RENAL FUNCTION PANEL: CPT

## 2017-04-04 PROCEDURE — 84443 ASSAY THYROID STIM HORMONE: CPT

## 2017-04-04 PROCEDURE — 84460 ALANINE AMINO (ALT) (SGPT): CPT

## 2017-04-04 PROCEDURE — 84450 TRANSFERASE (AST) (SGOT): CPT

## 2017-04-04 PROCEDURE — 3074F SYST BP LT 130 MM HG: CPT | Mod: S$GLB,,, | Performed by: PHYSICIAN ASSISTANT

## 2017-04-04 PROCEDURE — 1160F RVW MEDS BY RX/DR IN RCRD: CPT | Mod: S$GLB,,, | Performed by: PHYSICIAN ASSISTANT

## 2017-04-04 PROCEDURE — 99999 PR PBB SHADOW E&M-EST. PATIENT-LVL II: CPT | Mod: PBBFAC,,, | Performed by: OPTOMETRIST

## 2017-04-04 PROCEDURE — 92012 INTRM OPH EXAM EST PATIENT: CPT | Mod: S$GLB,,, | Performed by: OPTOMETRIST

## 2017-04-04 PROCEDURE — 36415 COLL VENOUS BLD VENIPUNCTURE: CPT | Mod: PO

## 2017-04-04 PROCEDURE — 99214 OFFICE O/P EST MOD 30 MIN: CPT | Mod: S$GLB,,, | Performed by: PHYSICIAN ASSISTANT

## 2017-04-04 RX ORDER — NEOMYCIN SULFATE, POLYMYXIN B SULFATE AND DEXAMETHASONE 3.5; 10000; 1 MG/ML; [USP'U]/ML; MG/ML
1 SUSPENSION/ DROPS OPHTHALMIC 4 TIMES DAILY
Qty: 5 ML | Refills: 0 | Status: SHIPPED | OUTPATIENT
Start: 2017-04-04 | End: 2017-04-09

## 2017-04-04 NOTE — MR AVS SNAPSHOT
[facility]  [address] [place] [address]  Phone: [phone]  Fax: [phone]                  Clarissa Turner   2017 11:30 AM   Office Visit    Description:  Female : 1970   Provider:  Isaura Good Jr., PA-C   Department:  [facility]           Reason for Visit     Diabetes Mellitus           Diagnoses this Visit        Comments    Uncontrolled type 2 diabetes mellitus with hyperglycemia, with long-term current use of insulin    -  Primary            To Do List           Future Appointments        Provider Department Dept Phone    2017 11:30 AM Isaura Good Jr., PA-C [facility] [phone]    2017 1:00 PM SPECIMEN, [facility] [phone]    2017 1:10 PM LABORATORY, [facility] [phone]    2017 8:45 AM [facility] [phone]    2017 8:00 AM MD [facility] [phone]      Goals (5 Years of Data)     None      Follow-Up and Disposition     Release results to [facility] Return in about 3 months (around 2017), or if symptoms worsen or fail to improve.      [facility]     [facility] Care [facility] -  Assistance  Unless otherwise directed by your provider, please contact [facility], our nurse care line that is available for  assistance.     Registered nurses in the [facility] provide: appointment scheduling, clinical advisement, health education, and other advisory services.  Call: [phone] (toll free)               Medications           Message regarding Medications     Verify the changes and/or additions to your medication regime listed below are the same as discussed with your clinician today.  If any of these changes or additions are incorrect, please notify your healthcare provider.        STOP taking these medications      acetaminophen (TYLENOL) 500 MG tablet Take 1,000 mg by mouth every 6 (six) hours as needed for Pain.           Verify that the below list of medications is an accurate representation of the medications you are currently taking.  If none reported, the list may be blank. If incorrect, please contact your healthcare provider. Carry this list with you in case of emergency.           Current Medications     atorvastatin (LIPITOR) 80 MG tablet TAKE ONE TABLET BY MOUTH ONCE DAILY    blood sugar diagnostic Strp Use to check blood sugars twice daily.    blood-glucose meter (ONETOUCH VERIO IQ METER) kit Use as instructed    DOCUSATE CALCIUM (STOOL SOFTENER ORAL) Take 2 tablets by mouth as needed.     fluticasone (FLONASE) 50 mcg/actuation nasal spray 1 spray by Each Nare route once daily.    gabapentin (NEURONTIN) 300 MG capsule Take 600 mg by mouth every evening.     glipiZIDE (GLUCOTROL) 5 MG tablet Take 1 tablet (5 mg total) by mouth daily with breakfast.    HUMALOG KWIKPEN 100 unit/mL InPn pen Sliding Scale:    150-200- 4 units  201-250= 6 unit  251-300= 8 units  301 greater 10 units and caused    insulin glargine (LANTUS SOLOSTAR) 100 unit/mL (3 mL) InPn pen Inject 30 Units into the skin every evening.    lancets [facility] Use to check blood sugars twice daily.    levothyroxine (SYNTHROID) 75 MCG tablet Take 1 tablet (75 mcg total) by mouth once daily. Avoid calcium, iron, food, or fiber within 1 hour of taking medication.    lidocaine (LIDODERM) 5 % APPLY 1 PATCH FOR 12 HOURS THEN 12 HOURS OFF PRN P    losartan (COZAAR) 25 MG tablet Take 1 tablet (25 mg total) by mouth once daily.    metformin (GLUCOPHAGE-XR) 500 MG 24 hr tablet TAKE ONE TABLET BY MOUTH TWICE DAILY WITH MEALS    neomycin-polymyxin-dexamethasone (MAXITROL) 3.5mg/mL-10,000 unit/mL-0.1 % DrpS Place 1 drop into the right eye 4 (four) times daily.    pantoprazole (PROTONIX) 40 MG tablet Take 40 mg by mouth once daily.    pen needle, diabetic (SURE-FINE PEN  "NEEDLES) 31 gauge x 3/16" Ndle 1 each by Misc.(Non-Drug; Combo Route) route 4 (four) times daily with meals and nightly.    trazodone (DESYREL) 100 MG tablet Take 1 tablet (100 mg total) by mouth every evening.    warfarin (COUMADIN) 6 MG tablet Take 2 tablets daily as directed by the coumadin clinic.    loratadine (CLARITIN) 10 mg tablet Take 1 tablet (10 mg total) by mouth once daily.           Clinical Reference Information           Your Vitals Were     BP Height Weight Last Period BMI    122/76 (BP Location: Right arm, Patient Position: Sitting, BP Method: Manual) 5' 5" (1.651 m) 133.4 kg (294 lb 1.5 oz) 04/14/2015 (Exact Date) 48.94 kg/m2      Blood Pressure          Most Recent Value    BP  122/76      Allergies as of 4/4/2017     Ace Inhibitors      Immunizations Administered on Date of Encounter - 4/4/2017     None      Orders Placed During Today's Visit      Normal Orders This Visit    POCT glucose     Future Labs/Procedures Expected by Expires    ALT (SGPT)  4/4/2017 6/3/2018    AST (SGOT)  4/4/2017 6/3/2018    CBC auto differential  4/4/2017 6/3/2018    Hemoglobin A1c  4/4/2017 6/3/2018    Lipid panel  4/4/2017 6/3/2018    Microalbumin/creatinine urine ratio  4/4/2017 4/4/2018    Renal function panel  4/4/2017 6/3/2018    TSH  4/4/2017 6/3/2018      Instructions     I have reviewed your results and they are still quite high. I would like you to start "Treating to Target". The treatment will be Insulin and your target will be the Fasting and 2 hour post meal blood sugar. It will work in this manner;    1. Goal for Fasting blood sugar is  mg/dl. I realize that you will need time to adjust to the new levels and presently you may feel too low if you are too aggressive now. So go slow and aim to lower your blood sugar to below 200 then 150 then 100 over several months.    2. Goal for 2 hour post meal blood sugar is below 180 mg/dl, here the same rules apply as in #1.    3. You will check your fasting " "blood sugar daily, if not where we would like it to be over a 3 day period then that evening we will increase the Lantus dose by 5 units. Then repeat the process over the next 3 days. Remember this is a slow process and take our time getting to goal. But, each week should be better than prior weeks. Blood sugars below 70 are unacceptable and should raise a "RED FLAG" where we may have to reduce our dose of insulin.    4. You will check your post meal glucose daily as well. However, each day you will check a different meal, (ie. Monday-breakfast; Tuesday- lunch; Wednesday- supper, then repeat). If your post meal glucose is not where we would like, increase pre-meal insulin by 2 units next time. A word of CAUTION: mealtime insulin is dependant on the size and concentration of your meal content. If not consuming a large meal do not take large dose of insulin. Use the reasonable person rule.     5. If you have any questions please do not hesitate to call.    Intensive insulin Therapy with correction factor:    You are on Intensive insulin therapy with Basal and Bolus insulin. Lantus, Levamir or NPH is your Basal insulin and will help maintain your fasting and between meal sugar. Your fast acting or rescue insulin is either Humalog, Novalog or Regular insulin and will control your post meal sugar.     You will Take 35 units of Lantus at 9 pm each night. This will be adjusted up or down depending on your fasting blood sugar before breakfast.    Humalog will follow this pre meal schedule; Correction factor of "2 units per 50 mg/dl" and is based on 30-60 grams of carbohydrates per meal.    If blood sugar is below 70 eat first then check your blood sugar 2 hours later and make correction.  If blood pre-meal sugar is  70 -150 take 7 units of Humalog;  If blood pre-meal sugar is 151-200 take +2 units of Humalog;  If blood pre-meal sugar is 201-250 take +4 units of Humalog;  If blood pre-meal sugar is 251-300 take +6 units of " Humalog;  If blood pre-meal sugar is 301-350 take +8 units of Humalog;  If blood pre-meal sugar is 351-400+ take +10 units of Humalog;  Also increase water intake and call for appointment.         Language Assistance Services     ATTENTION: Language assistance services are available, free of charge. Please call [phone].      ATENCIÓN: Si habla español, tiene a cordoba disposición servicios gratuitos de asistencia lingüística. Llame al [phone].     CHÚ Ý: N?u b?n nói Ti?ng Vi?t, có các d?ch v? h? tr? ngôn ng? mi?n phí dành cho b?n. G?i s? [phone].         [facility] complies with applicable Federal civil rights laws and does not discriminate on the basis of race, color, national origin, age, disability, or sex.
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
## 2017-04-04 NOTE — PATIENT INSTRUCTIONS
" I have reviewed your results and they are still quite high. I would like you to start "Treating to Target". The treatment will be Insulin and your target will be the Fasting and 2 hour post meal blood sugar. It will work in this manner;    1. Goal for Fasting blood sugar is  mg/dl. I realize that you will need time to adjust to the new levels and presently you may feel too low if you are too aggressive now. So go slow and aim to lower your blood sugar to below 200 then 150 then 100 over several months.    2. Goal for 2 hour post meal blood sugar is below 180 mg/dl, here the same rules apply as in #1.    3. You will check your fasting blood sugar daily, if not where we would like it to be over a 3 day period then that evening we will increase the Lantus dose by 5 units. Then repeat the process over the next 3 days. Remember this is a slow process and take our time getting to goal. But, each week should be better than prior weeks. Blood sugars below 70 are unacceptable and should raise a "RED FLAG" where we may have to reduce our dose of insulin.    4. You will check your post meal glucose daily as well. However, each day you will check a different meal, (ie. Monday-breakfast; Tuesday- lunch; Wednesday- supper, then repeat). If your post meal glucose is not where we would like, increase pre-meal insulin by 2 units next time. A word of CAUTION: mealtime insulin is dependant on the size and concentration of your meal content. If not consuming a large meal do not take large dose of insulin. Use the reasonable person rule.     5. If you have any questions please do not hesitate to call.    Intensive insulin Therapy with correction factor:    You are on Intensive insulin therapy with Basal and Bolus insulin. Lantus, Levamir or NPH is your Basal insulin and will help maintain your fasting and between meal sugar. Your fast acting or rescue insulin is either Humalog, Novalog or Regular insulin and will control your " "post meal sugar.     You will Take 35 units of Lantus at 9 pm each night. This will be adjusted up or down depending on your fasting blood sugar before breakfast.    Humalog will follow this pre meal schedule; Correction factor of "2 units per 50 mg/dl" and is based on 30-60 grams of carbohydrates per meal.    If blood sugar is below 70 eat first then check your blood sugar 2 hours later and make correction.  If blood pre-meal sugar is  70 -150 take 7 units of Humalog;  If blood pre-meal sugar is 151-200 take +2 units of Humalog;  If blood pre-meal sugar is 201-250 take +4 units of Humalog;  If blood pre-meal sugar is 251-300 take +6 units of Humalog;  If blood pre-meal sugar is 301-350 take +8 units of Humalog;  If blood pre-meal sugar is 351-400+ take +10 units of Humalog;  Also increase water intake and call for appointment.    "

## 2017-04-04 NOTE — MR AVS SNAPSHOT
[facility]  [address] [facility] [place] [address]  Phone: [phone]  Fax: [phone]                  Clarissa Turner   2017 10:15 AM   Office Visit    Description:  Female : 1970   Provider:  FEI Guillermo, OD   Department:  [facility]           Reason for Visit     Eye Problem           Diagnoses this Visit        Comments    Acute conjunctivitis, unspecified    -  Primary            To Do List           Future Appointments        Provider Department Dept Phone    2017 10:15 AM FEI Guillermo, DAVID [facility] [phone]    2017 11:30 AM Isaura Good Jr., PA-C [facility] - Diabetes Management [phone]    2017 8:45 AM Bhumi BansalD [facility] - Coumadin [phone]    2017 8:00 AM Delano Campbell MD [facility] - Pulmonary Services [phone]      Goals (5 Years of Data)     None      Follow-Up and Disposition     Return if symptoms worsen or fail to improve.       These Medications        Disp Refills Start End    neomycin-polymyxin-dexamethasone (MAXITROL) 3.5mg/mL-10,000 unit/mL-0.1 % DrpS 5 mL 0 2017    Place 1 drop into the right eye 4 (four) times daily. - Right Eye    Pharmacy: [facility] [address] [place], [place] [address] -  [address] AT [facility] Ph #: [phone]         [facility]     [facility] Care [facility] - 24/ Assistance  Unless otherwise directed by your provider, please contact [facility], our nurse care line that is available for 24/ assistance.     Registered nurses in the [facility] provide: appointment scheduling, clinical advisement, health education, and other advisory services.  Call: [phone] (toll free)               Medications           Message regarding Medications     Verify the changes and/or additions to your medication regime listed below are the same as discussed with your clinician today.  If any of  these changes or additions are incorrect, please notify your healthcare provider.        START taking these NEW medications        Refills    neomycin-polymyxin-dexamethasone (MAXITROL) 3.5mg/mL-10,000 unit/mL-0.1 % DrpS 0    Sig: Place 1 drop into the right eye 4 (four) times daily.    Class: Normal    Route: Right Eye           Verify that the below list of medications is an accurate representation of the medications you are currently taking.  If none reported, the list may be blank. If incorrect, please contact your healthcare provider. Carry this list with you in case of emergency.           Current Medications     acetaminophen (TYLENOL) 500 MG tablet Take 1,000 mg by mouth every 6 (six) hours as needed for Pain.    atorvastatin (LIPITOR) 80 MG tablet TAKE ONE TABLET BY MOUTH ONCE DAILY    blood sugar diagnostic Strp Use to check blood sugars twice daily.    blood-glucose meter (ONETOUCH VERIO IQ METER) kit Use as instructed    DOCUSATE CALCIUM (STOOL SOFTENER ORAL) Take 2 tablets by mouth as needed.     fluticasone (FLONASE) 50 mcg/actuation nasal spray 1 spray by Each Nare route once daily.    gabapentin (NEURONTIN) 300 MG capsule Take 600 mg by mouth every evening.     glipiZIDE (GLUCOTROL) 5 MG tablet Take 1 tablet (5 mg total) by mouth daily with breakfast.    HUMALOG KWIKPEN 100 unit/mL InPn pen Sliding Scale:    150-200- 4 units  201-250= 6 unit  251-300= 8 units  301 greater 10 units and caused    insulin glargine (LANTUS SOLOSTAR) 100 unit/mL (3 mL) InPn pen Inject 30 Units into the skin every evening.    lancets Misc Use to check blood sugars twice daily.    levothyroxine (SYNTHROID) 75 MCG tablet Take 1 tablet (75 mcg total) by mouth once daily. Avoid calcium, iron, food, or fiber within 1 hour of taking medication.    lidocaine (LIDODERM) 5 % APPLY 1 PATCH FOR 12 HOURS THEN 12 HOURS OFF PRN P    loratadine (CLARITIN) 10 mg tablet Take 1 tablet (10 mg total) by mouth once daily.    losartan (COZAAR)  "25 MG tablet Take 1 tablet (25 mg total) by mouth once daily.    metformin (GLUCOPHAGE-XR) 500 MG 24 hr tablet TAKE ONE TABLET BY MOUTH TWICE DAILY WITH MEALS    neomycin-polymyxin-dexamethasone (MAXITROL) 3.5mg/mL-10,000 unit/mL-0.1 % DrpS Place 1 drop into the right eye 4 (four) times daily.    pantoprazole (PROTONIX) 40 MG tablet Take 40 mg by mouth once daily.    pen needle, diabetic (SURE-FINE PEN NEEDLES) 31 gauge x 3/16" Ndle 1 each by Misc.(Non-Drug; Combo Route) route 4 (four) times daily with meals and nightly.    trazodone (DESYREL) 100 MG tablet Take 1 tablet (100 mg total) by mouth every evening.    warfarin (COUMADIN) 6 MG tablet Take 2 tablets daily as directed by the coumadin clinic.           Clinical Reference Information           Your Vitals Were     Last Period                   04/14/2015 (Exact Date)           Allergies as of 4/4/2017     Ace Inhibitors      Immunizations Administered on Date of Encounter - 4/4/2017     None      Language Assistance Services     ATTENTION: Language assistance services are available, free of charge. Please call [phone].      ATENCIÓN: Si audie best, tiene a cordoba disposición servicios gratuitos de asistencia lingüística. Llame al [phone].     JULY Ý: N?u b?n nói Ti?ng Vi?t, có các d?ch v? h? tr? ngôn ng? mi?n phí dành cho b?n. G?i s? [phone].         [facility] complies with applicable Federal civil rights laws and does not discriminate on the basis of race, color, national origin, age, disability, or sex.        "

## 2017-04-05 LAB
ALBUMIN SERPL BCP-MCNC: 3.5 G/DL
ALT SERPL W/O P-5'-P-CCNC: 14 U/L
ANION GAP SERPL CALC-SCNC: 10 MMOL/L
AST SERPL-CCNC: 19 U/L
BUN SERPL-MCNC: 9 MG/DL
CALCIUM SERPL-MCNC: 8.6 MG/DL
CHLORIDE SERPL-SCNC: 108 MMOL/L
CHOLEST/HDLC SERPL: 4 {RATIO}
CO2 SERPL-SCNC: 23 MMOL/L
CREAT SERPL-MCNC: 0.8 MG/DL
EST. GFR  (AFRICAN AMERICAN): >60 ML/MIN/1.73 M^2
EST. GFR  (NON AFRICAN AMERICAN): >60 ML/MIN/1.73 M^2
ESTIMATED AVG GLUCOSE: 157 MG/DL
GLUCOSE SERPL-MCNC: 65 MG/DL
HBA1C MFR BLD HPLC: 7.1 %
HDL/CHOLESTEROL RATIO: 25.3 %
HDLC SERPL-MCNC: 174 MG/DL
HDLC SERPL-MCNC: 44 MG/DL
LDLC SERPL CALC-MCNC: 98.6 MG/DL
NONHDLC SERPL-MCNC: 130 MG/DL
PHOSPHATE SERPL-MCNC: 2.9 MG/DL
POTASSIUM SERPL-SCNC: 4.2 MMOL/L
SODIUM SERPL-SCNC: 141 MMOL/L
TRIGL SERPL-MCNC: 157 MG/DL
TSH SERPL DL<=0.005 MIU/L-ACNC: 1.2 UIU/ML

## 2017-04-06 ENCOUNTER — ANTI-COAG VISIT (OUTPATIENT)
Dept: CARDIOLOGY | Facility: CLINIC | Age: 47
End: 2017-04-06
Payer: COMMERCIAL

## 2017-04-06 DIAGNOSIS — Z79.01 LONG TERM (CURRENT) USE OF ANTICOAGULANTS: Primary | ICD-10-CM

## 2017-04-06 LAB — INR PPP: 1.5 (ref 2–3)

## 2017-04-06 PROCEDURE — 85610 PROTHROMBIN TIME: CPT | Mod: QW,S$GLB,,

## 2017-04-06 PROCEDURE — 99211 OFF/OP EST MAY X REQ PHY/QHP: CPT | Mod: 25,S$GLB,,

## 2017-04-06 RX ORDER — WARFARIN 10 MG/1
TABLET ORAL
Qty: 44 TABLET | Refills: 3 | Status: SHIPPED | OUTPATIENT
Start: 2017-04-06 | End: 2017-07-25 | Stop reason: SDUPTHER

## 2017-04-06 NOTE — PROGRESS NOTES
INR remains at 1.5. Patient confirms dose and compliance. Denies high vitamin k foods. Will increase total weekly dose until follow-up. Repeat INR in 1 week.

## 2017-04-06 NOTE — MR AVS SNAPSHOT
[facility]  [address]  [place] [address]  Phone: [phone]  Fax: [phone]                  Clarissa Turner   2017 10:30 AM   Anti-coag visit    Description:  Female : 1970   Provider:  Anuja Jeong PharmD   Department:  [facility]           Diagnoses this Visit        Comments    Long term (current) use of anticoagulants    -  Primary            To Do List           Future Appointments        Provider Department Dept Phone    2017 10:30 AM Anuja Jeong PharmD [facility] [phone]    2017 9:15 AM Anuja Jeong PharmD [facility] [phone]    2017 8:00 AM MD [facility]SH[facility] Pul[facility] [phone]    2017 9:00 AM Isaura Good Jr., PACorrieC [facility] - Diabetes Management [phone]      Goals (5 Years of Data)     None      [facility]     [facility] -  Assistance  Unless otherwise directed by your provider, please contact [facility], our nurse care line that is available for  assistance.     Registered nurses in the [facility] provide: appointment scheduling, clinical advisement, health education, and other advisory services.  Call: [phone] (toll free)               Medications           Message regarding Medications     Verify the changes and/or additions to your medication regime listed below are the same as discussed with your clinician today.  If any of these changes or additions are incorrect, please notify your healthcare provider.             Verify that the below list of medications is an accurate representation of the medications you are currently taking.  If none reported, the list may be blank. If incorrect, please contact your healthcare provider. Carry this list with you in case of emergency.           Current Medications     atorvastatin (LIPITOR) 80 MG tablet TAKE ONE TABLET BY MOUTH ONCE DAILY    blood  "sugar diagnostic Strp Use to check blood sugars twice daily.    blood-glucose meter (ONETOUCH VERIO IQ METER) kit Use as instructed    DOCUSATE CALCIUM (STOOL SOFTENER ORAL) Take 2 tablets by mouth as needed.     fluticasone (FLONASE) 50 mcg/actuation nasal spray 1 spray by Each Nare route once daily.    gabapentin (NEURONTIN) 300 MG capsule Take 600 mg by mouth every evening.     glipiZIDE (GLUCOTROL) 5 MG tablet Take 1 tablet (5 mg total) by mouth daily with breakfast.    HUMALOG KWIKPEN 100 unit/mL InPn pen Sliding Scale:    150-200- 4 units  201-250= 6 unit  251-300= 8 units  301 greater 10 units and caused    insulin glargine (LANTUS SOLOSTAR) 100 unit/mL (3 mL) InPn pen Inject 30 Units into the skin every evening.    lancets Misc Use to check blood sugars twice daily.    levothyroxine (SYNTHROID) 75 MCG tablet Take 1 tablet (75 mcg total) by mouth once daily. Avoid calcium, iron, food, or fiber within 1 hour of taking medication.    lidocaine (LIDODERM) 5 % APPLY 1 PATCH FOR 12 HOURS THEN 12 HOURS OFF PRN P    loratadine (CLARITIN) 10 mg tablet Take 1 tablet (10 mg total) by mouth once daily.    losartan (COZAAR) 25 MG tablet Take 1 tablet (25 mg total) by mouth once daily.    metformin (GLUCOPHAGE-XR) 500 MG 24 hr tablet TAKE ONE TABLET BY MOUTH TWICE DAILY WITH MEALS    neomycin-polymyxin-dexamethasone (MAXITROL) 3.5mg/mL-10,000 unit/mL-0.1 % DrpS Place 1 drop into the right eye 4 (four) times daily.    pantoprazole (PROTONIX) 40 MG tablet Take 40 mg by mouth once daily.    pen needle, diabetic (SURE-FINE PEN NEEDLES) 31 gauge x 3/16" Ndle 1 each by Misc.(Non-Drug; Combo Route) route 4 (four) times daily with meals and nightly.    trazodone (DESYREL) 100 MG tablet Take 1 tablet (100 mg total) by mouth every evening.    warfarin (COUMADIN) 6 MG tablet Take 2 tablets daily as directed by the coumadin clinic.           Clinical Reference Information           Your Vitals Were     Last Period                   " 04/14/2015 (Exact Date)           Allergies as of 4/6/2017     Ace Inhibitors      Immunizations Administered on Date of Encounter - 4/6/2017     None      Orders Placed During Today's Visit      Normal Orders This Visit    POCT INR          4/6/2017  8:57 AM - Anuja Jeong, PharmD      Component Results     Component Value Flag Ref Range Units Status    INR 1.5 (A) 2.0 - 3.0  Final      March 2017 Details    Sun Mon Tue Wed Thu Fri Sat        1               2               3               4                 5               6               7      12 mg         8      12 mg         9   2.6   12 mg   See details      10      12 mg         11      12 mg           12      12 mg         13      12 mg         14      12 mg         15      12 mg         16      12 mg         17      12 mg         18      12 mg           19      12 mg         20      12 mg         21      12 mg         22      12 mg         23      12 mg         24      12 mg         25      12 mg           26      12 mg         27      12 mg         28      12 mg         29      12 mg         30   1.5   18 mg   See details      31      12 mg           Date Details   03/09 INR: 2.6   Coumadin Therapy: 2.0 - 3.0 for INR for all indicators except mechanical heart valves and antiphospholipid syndromes which should use 2.5 - 3.5.       03/30 Last INR check   INR: 1.5                 April 2017 Details    Sun Mon Tue Wed Thu Fri Sat           1      12 mg           2      12 mg         3      12 mg         4      12 mg         5      12 mg         6   1.5   20 mg   See details      7      20 mg         8      20 mg           9      20 mg         10      10 mg         11      10 mg         12      10 mg         13            14               15                 16               17               18               19               20               21               22                 23               24               25               26               27                28               29                 30                      Date Details   04/06 This INR check   INR: 1.5       Date of next INR:  4/13/2017               How to take your warfarin dose     To take:  10 mg Take 1 of the 10 mg tablets.    To take:  20 mg Take 2 of the 10 mg tablets.           Anticoagulation Summary as of 4/6/2017     Maintenance plan 10 mg (10 mg x 1) on Mon, Tue, Wed; 20 mg (10 mg x 2) all other days    Full instructions 10 mg on Mon, Tue, Wed; 20 mg all other days    Next INR check 4/13/2017      Anticoagulation Episode Summary     Comments       Language Assistance Services     ATTENTION: Language assistance services are available, free of charge. Please call [phone].      ATENCIÓN: Si audie best, tiene a cordoba disposición servicios gratuitos de asistencia lingüística. Llame al [phone].     CHÚ Ý: N?u b?n nói Ti?ng Vi?t, có các d?ch v? h? tr? ngôn ng? mi?n phí dành cho b?n. G?i s? [phone].         [facility] complies with applicable Federal civil rights laws and does not discriminate on the basis of race, color, national origin, age, disability, or sex.

## 2017-04-07 RX ORDER — GLIPIZIDE 5 MG/1
5 TABLET ORAL
Qty: 30 TABLET | Refills: 1 | Status: SHIPPED | OUTPATIENT
Start: 2017-04-07 | End: 2017-07-17 | Stop reason: SDUPTHER
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
## 2017-04-13 ENCOUNTER — ANTI-COAG VISIT (OUTPATIENT)
Dept: CARDIOLOGY | Facility: CLINIC | Age: 47
End: 2017-04-13
Payer: COMMERCIAL

## 2017-04-13 DIAGNOSIS — Z79.01 LONG TERM (CURRENT) USE OF ANTICOAGULANTS: Primary | ICD-10-CM

## 2017-04-13 LAB — INR PPP: 2.3 (ref 2–3)

## 2017-04-13 PROCEDURE — 99211 OFF/OP EST MAY X REQ PHY/QHP: CPT | Mod: 25,S$GLB,,

## 2017-04-13 PROCEDURE — 85610 PROTHROMBIN TIME: CPT | Mod: QW,S$GLB,,

## 2017-04-13 NOTE — MR AVS SNAPSHOT
[facility]  [address]  [place] [address]  Phone: [phone]  Fax: [phone]                  Clarissa Turner   2017 8:30 AM   Anti-coag visit    Description:  Female : 1970   Provider:  Anuja Jeong PharmD   Department:  [facility]           Diagnoses this Visit        Comments    Long term (current) use of anticoagulants    -  Primary            To Do List           Future Appointments        Provider Department Dept Phone    2017 8:30 AM Anuja Jeong PharmD [facility] [phone]    2017 8:00 AM Delano Campbell MD [facility] [phone]    2017 8:45 AM Anuja Jeong PharmD [facility] [phone]    2017 9:00 AM Isaura Good Jr., PALIVIER [facility] [phone]      Goals (5 Years of Data)     None      [facility]     [facility] -  Assistance  Unless otherwise directed by your provider, please contact [facility], our nurse care line that is available for  assistance.     Registered nurses in the [facility] provide: appointment scheduling, clinical advisement, health education, and other advisory services.  Call: [phone] (toll free)               Medications           Message regarding Medications     Verify the changes and/or additions to your medication regime listed below are the same as discussed with your clinician today.  If any of these changes or additions are incorrect, please notify your healthcare provider.             Verify that the below list of medications is an accurate representation of the medications you are currently taking.  If none reported, the list may be blank. If incorrect, please contact your healthcare provider. Carry this list with you in case of emergency.           Current Medications     atorvastatin (LIPITOR) 80 MG tablet TAKE ONE TABLET BY MOUTH ONCE DAILY    blood  "sugar diagnostic Strp Use to check blood sugars twice daily.    blood-glucose meter (ONETOUCH VERIO IQ METER) kit Use as instructed    DOCUSATE CALCIUM (STOOL SOFTENER ORAL) Take 2 tablets by mouth as needed.     fluticasone (FLONASE) 50 mcg/actuation nasal spray 1 spray by Each Nare route once daily.    gabapentin (NEURONTIN) 300 MG capsule Take 600 mg by mouth every evening.     glipiZIDE (GLUCOTROL) 5 MG tablet Take 1 tablet (5 mg total) by mouth daily with breakfast.    HUMALOG KWIKPEN 100 unit/mL InPn pen Sliding Scale:    150-200- 4 units  201-250= 6 unit  251-300= 8 units  301 greater 10 units and caused    insulin glargine (LANTUS SOLOSTAR) 100 unit/mL (3 mL) InPn pen Inject 30 Units into the skin every evening.    lancets [facility] Use to check blood sugars twice daily.    levothyroxine (SYNTHROID) 75 MCG tablet Take 1 tablet (75 mcg total) by mouth once daily. Avoid calcium, iron, food, or fiber within 1 hour of taking medication.    lidocaine (LIDODERM) 5 % APPLY 1 PATCH FOR 12 HOURS THEN 12 HOURS OFF PRN P    loratadine (CLARITIN) 10 mg tablet Take 1 tablet (10 mg total) by mouth once daily.    losartan (COZAAR) 25 MG tablet Take 1 tablet (25 mg total) by mouth once daily.    metformin (GLUCOPHAGE-XR) 500 MG 24 hr tablet TAKE ONE TABLET BY MOUTH TWICE DAILY WITH MEALS    pantoprazole (PROTONIX) 40 MG tablet Take 40 mg by mouth once daily.    pen needle, diabetic (SURE-FINE PEN NEEDLES) 31 gauge x 3/16" Ndle 1 each by Misc.(Non-Drug; Combo Route) route 4 (four) times daily with meals and nightly.    trazodone (DESYREL) 100 MG tablet Take 1 tablet (100 mg total) by mouth every evening.    warfarin (COUMADIN) 10 MG tablet Take 2 tablets daily except 1 tablet on Monday, Tuesdays, Wednesday as directed by the coumadin clinic. Discontinue 6mg tablet.           Clinical Reference Information           Your Vitals Were     Last Period                   04/14/2015 (Exact Date)           Allergies as of 4/13/2017     " Ace Inhibitors      Immunizations Administered on Date of Encounter - 4/13/2017     None      Orders Placed During Today's Visit      Normal Orders This Visit    POCT INR          4/13/2017  8:04 AM - Anuja Jeong, PharmD      Component Results     Component Value Flag Ref Range Units Status    INR 2.3  2.0 - 3.0  Final      March 2017 Details    Sun Mon Tue Wed Thu Fri Sat        1               2               3               4                 5               6               7               8               9               10               11                 12               13               14      12 mg         15      12 mg         16      12 mg         17      12 mg         18      12 mg           19      12 mg         20      12 mg         21      12 mg         22      12 mg         23      12 mg         24      12 mg         25      12 mg           26      12 mg         27      12 mg         28      12 mg         29      12 mg         30   1.5   18 mg   See details      31      12 mg           Date Details   03/30 INR: 1.5                 April 2017 Details    Sun Mon Tue Wed Thu Fri Sat           1      12 mg           2      12 mg         3      12 mg         4      12 mg         5      12 mg         6   1.5   20 mg   See details      7      20 mg         8      20 mg           9      20 mg         10      10 mg         11      10 mg         12      10 mg         13   2.3   20 mg   See details      14      20 mg         15      20 mg           16      20 mg         17      10 mg         18      10 mg         19      10 mg         20      20 mg         21      20 mg         22      20 mg           23      20 mg         24      10 mg         25      10 mg         26      10 mg         27      20 mg         28      20 mg         29      20 mg           30      20 mg                Date Details   04/06 Last INR check   INR: 1.5      04/13 This INR check   INR: 2.3                     How to take your  warfarin dose     To take:  10 mg Take 1 of the 10 mg tablets.    To take:  20 mg Take 2 of the 10 mg tablets.           May 2017 Details    Sun Mon Tue Wed Thu Fri Sat      1      10 mg         2      10 mg         3      10 mg         4            5               6                 7               8               9               10               11               12               13                 14               15               16               17               18               19               20                 21               22               23               24               25               26               27                 28               29               30               31                   Date Details   No additional details    Date of next INR:  5/4/2017         How to take your warfarin dose     To take:  10 mg Take 1 of the 10 mg tablets.    To take:  20 mg Take 2 of the 10 mg tablets.           Anticoagulation Summary as of 4/13/2017     Maintenance plan 10 mg (10 mg x 1) on Mon, Tue, Wed; 20 mg (10 mg x 2) all other days    Full instructions 10 mg on Mon, Tue, Wed; 20 mg all other days    Next INR check 5/4/2017      Anticoagulation Episode Summary     Comments       Language Assistance Services     ATTENTION: Language assistance services are available, free of charge. Please call [phone].      ATENCIÓN: Si habla estephania, tiene a cordoba disposición servicios gratuitos de asistencia lingüística. Llame al [phone].     CHÚ Ý: N?u b?n nói Ti?ng Vi?t, có các d?ch v? h? tr? ngôn ng? mi?n phí dành cho b?n. G?i s? [phone].         [facility] complies with applicable Federal civil rights laws and does not discriminate on the basis of race, color, national origin, age, disability, or sex.

## 2017-04-13 NOTE — PROGRESS NOTES
INR is now therapeutic. This is a quick follow-up after a sub-therapeutic INR on 4/6. Continue dose and diet until follow-up. Repeat INR in 3 weeks.

## 2017-04-18 ENCOUNTER — OFFICE VISIT (OUTPATIENT)
Dept: PULMONOLOGY | Facility: CLINIC | Age: 47
End: 2017-04-18
Payer: COMMERCIAL

## 2017-04-18 ENCOUNTER — LAB VISIT (OUTPATIENT)
Dept: LAB | Facility: HOSPITAL | Age: 47
End: 2017-04-18
Attending: INTERNAL MEDICINE
Payer: COMMERCIAL

## 2017-04-18 VITALS
DIASTOLIC BLOOD PRESSURE: 63 MMHG | HEIGHT: 65 IN | RESPIRATION RATE: 18 BRPM | BODY MASS INDEX: 48.82 KG/M2 | SYSTOLIC BLOOD PRESSURE: 110 MMHG | HEART RATE: 104 BPM | WEIGHT: 293 LBS | OXYGEN SATURATION: 95 %

## 2017-04-18 DIAGNOSIS — G47.00 INSOMNIA, UNSPECIFIED TYPE: Primary | ICD-10-CM

## 2017-04-18 DIAGNOSIS — Z79.4 CONTROLLED TYPE 2 DIABETES MELLITUS WITHOUT COMPLICATION, WITH LONG-TERM CURRENT USE OF INSULIN: ICD-10-CM

## 2017-04-18 DIAGNOSIS — E66.01 MORBID OBESITY, UNSPECIFIED OBESITY TYPE: ICD-10-CM

## 2017-04-18 DIAGNOSIS — I10 ESSENTIAL HYPERTENSION: ICD-10-CM

## 2017-04-18 DIAGNOSIS — E11.9 CONTROLLED TYPE 2 DIABETES MELLITUS WITHOUT COMPLICATION, WITH LONG-TERM CURRENT USE OF INSULIN: ICD-10-CM

## 2017-04-18 DIAGNOSIS — I82.432 ACUTE DEEP VEIN THROMBOSIS (DVT) OF POPLITEAL VEIN OF LEFT LOWER EXTREMITY: ICD-10-CM

## 2017-04-18 DIAGNOSIS — G47.00 INSOMNIA, UNSPECIFIED TYPE: ICD-10-CM

## 2017-04-18 DIAGNOSIS — G47.33 OSA (OBSTRUCTIVE SLEEP APNEA): Chronic | ICD-10-CM

## 2017-04-18 LAB — FERRITIN SERPL-MCNC: 15 NG/ML

## 2017-04-18 PROCEDURE — 99999 PR PBB SHADOW E&M-EST. PATIENT-LVL III: CPT | Mod: PBBFAC,,, | Performed by: INTERNAL MEDICINE

## 2017-04-18 PROCEDURE — 36415 COLL VENOUS BLD VENIPUNCTURE: CPT

## 2017-04-18 PROCEDURE — 4010F ACE/ARB THERAPY RXD/TAKEN: CPT | Mod: S$GLB,,, | Performed by: INTERNAL MEDICINE

## 2017-04-18 PROCEDURE — 1160F RVW MEDS BY RX/DR IN RCRD: CPT | Mod: S$GLB,,, | Performed by: INTERNAL MEDICINE

## 2017-04-18 PROCEDURE — 82728 ASSAY OF FERRITIN: CPT

## 2017-04-18 PROCEDURE — 99205 OFFICE O/P NEW HI 60 MIN: CPT | Mod: S$GLB,,, | Performed by: INTERNAL MEDICINE

## 2017-04-18 PROCEDURE — 3045F PR MOST RECENT HEMOGLOBIN A1C LEVEL 7.0-9.0%: CPT | Mod: S$GLB,,, | Performed by: INTERNAL MEDICINE

## 2017-04-18 PROCEDURE — 3074F SYST BP LT 130 MM HG: CPT | Mod: S$GLB,,, | Performed by: INTERNAL MEDICINE

## 2017-04-18 PROCEDURE — 3078F DIAST BP <80 MM HG: CPT | Mod: S$GLB,,, | Performed by: INTERNAL MEDICINE

## 2017-04-18 NOTE — PATIENT INSTRUCTIONS
Delayed bed time to 11 pm  Wake time 7 am    Thank You    Thank you for choosing the PULMONARY MEDICINE DEPARTMENT at [facility]. At [facility], your satisfaction is our priority. You may receive a survey asking about your experience with us. We would appreciate you taking the time to complete and return the survey. Our goal is to provide you with a level 5 or Very Good experience. We thank you for allowing us to serve you and value your feedback.    Your Nurse/Medical Assistant: ___Kenneth Boothe *___________________________    Sincerely,  Pulmonary Department  Phone: [phone]  Fax: [phone]     Your provider has scheduled you for a sleep study.   You should be receiving a phone call from the sleep lab shortly after your study has been approved by your insurance. Please make sure you have your current phone numbers in the [facility] system. If you do not hear from anyone in the next 10 business days, please call the sleep lab at [phone] to schedule your sleep study. The sleep studies are performed at [facility] seven nights a week.  When you are scheduling your sleep study, they will also make you a follow up appointment with your provider. This follow up appointment will be 10-14 days after your sleep study to review the results. If it is noted that you do have sleep apnea on your initial sleep study, you may receive a call back for a second night study with the CPAP before you come back to the office.
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
## 2017-04-18 NOTE — LETTER
April 18, 2017      Luzmaria Quiñones MD  [address]  [facility] [address]           [facility]  [address] [place] [address]  [place] [address]  Phone: [phone]  Fax: [phone]          Patient: Clarissa Turner   MR Number: [id number]   YOB: 1970   Date of Visit: 4/18/2017       Dear Dr. Luzmaria Quiñones:    Thank you for referring Clarissa Turner to me for evaluation. Attached you will find relevant portions of my assessment and plan of care.    If you have questions, please do not hesitate to call me. I look forward to following Clarissa Turner along with you.    Sincerely,    Delano Campbell MD    Enclosure  CC:  No Recipients    If you would like to receive this communication electronically, please contact [email or web address] or [phone] to request more information on [employer] access.    For providers and/or their staff who would like to refer a patient to [facility], please contact us through our one-stop-shop provider referral line, [facility], at [phone].    If you feel you have received this communication in error or would no longer like to receive these types of communications, please e-mail [email or web address]

## 2017-04-18 NOTE — MR AVS SNAPSHOT
[facility]  [address]  [place] [address]  Phone: [phone]  Fax: [phone]                  Clarissa Turner   2017 8:00 AM   Office Visit    Description:  Female : 1970   Provider:  Delano Campbell MD   Department:  [facility]           Reason for Visit     Sleep Apnea           Diagnoses this Visit        Comments    Insomnia, unspecified type    -  Primary     JAKE (obstructive sleep apnea)         Controlled type 2 diabetes mellitus without complication, with long-term current use of insulin         Acute deep vein thrombosis (DVT) of popliteal vein of left lower extremity         Morbid obesity, unspecified obesity type         Essential hypertension                To Do List           Future Appointments        Provider Department Dept Phone    2017 10:30 AM LABORATORY, [address] [facility] [phone]    2017 8:45 AM Anuja Jeong, PharmD [facility] - Coumadin [phone]    2017 8:20 AM Delano Campbell MD [facility] [phone]    2017 9:00 AM Isaura Good Jr., PA-C [facility] - Diabetes Management [phone]      Goals (5 Years of Data)     None      Follow-Up and Disposition     Return in about 6 weeks (around 2017) for PSG, ferritn today. EDMUNDO and SDI questionaire.      [facility]     [facility] -  Assistance  Unless otherwise directed by your provider, please contact [facility], our nurse care line that is available for  assistance.     Registered nurses in the [facility] provide: appointment scheduling, clinical advisement, health education, and other advisory services.  Call: [phone] (toll free)               Medications           Message regarding Medications     Verify the changes and/or additions to your medication regime listed below are the same as discussed with your clinician  today.  If any of these changes or additions are incorrect, please notify your healthcare provider.             Verify that the below list of medications is an accurate representation of the medications you are currently taking.  If none reported, the list may be blank. If incorrect, please contact your healthcare provider. Carry this list with you in case of emergency.           Current Medications     atorvastatin (LIPITOR) 80 MG tablet TAKE ONE TABLET BY MOUTH ONCE DAILY    blood sugar diagnostic Strp Use to check blood sugars twice daily.    blood-glucose meter (ONETOUCH VERIO IQ METER) kit Use as instructed    DOCUSATE CALCIUM (STOOL SOFTENER ORAL) Take 2 tablets by mouth as needed.     fluticasone (FLONASE) 50 mcg/actuation nasal spray 1 spray by Each Nare route once daily.    gabapentin (NEURONTIN) 300 MG capsule Take 600 mg by mouth every evening.     glipiZIDE (GLUCOTROL) 5 MG tablet Take 1 tablet (5 mg total) by mouth daily with breakfast.    HUMALOG KWIKPEN 100 unit/mL InPn pen Sliding Scale:    150-200- 4 units  201-250= 6 unit  251-300= 8 units  301 greater 10 units and caused    insulin glargine (LANTUS SOLOSTAR) 100 unit/mL (3 mL) InPn pen Inject 30 Units into the skin every evening.    lancets Misc Use to check blood sugars twice daily.    levothyroxine (SYNTHROID) 75 MCG tablet Take 1 tablet (75 mcg total) by mouth once daily. Avoid calcium, iron, food, or fiber within 1 hour of taking medication.    lidocaine (LIDODERM) 5 % APPLY 1 PATCH FOR 12 HOURS THEN 12 HOURS OFF PRN P    loratadine (CLARITIN) 10 mg tablet Take 1 tablet (10 mg total) by mouth once daily.    losartan (COZAAR) 25 MG tablet Take 1 tablet (25 mg total) by mouth once daily.    metformin (GLUCOPHAGE-XR) 500 MG 24 hr tablet TAKE ONE TABLET BY MOUTH TWICE DAILY WITH MEALS    pantoprazole (PROTONIX) 40 MG tablet Take 40 mg by mouth once daily.    trazodone (DESYREL) 100 MG tablet Take 1 tablet (100 mg total) by mouth every evening.     "warfarin (COUMADIN) 10 MG tablet Take 2 tablets daily except 1 tablet on Monday, Tuesdays, Wednesday as directed by the coumadin clinic. Discontinue 6mg tablet.           Clinical Reference Information           Your Vitals Were     BP Pulse Resp Height Weight Last Period    110/63 104 18 5' 5" (1.651 m) 134 kg (295 lb 6.7 oz) 04/14/2015 (Exact Date)    SpO2 BMI             95% 49.16 kg/m2         Blood Pressure          Most Recent Value    BP  110/63      Allergies as of 4/18/2017     Ace Inhibitors      Immunizations Administered on Date of Encounter - 4/18/2017     None      Orders Placed During Today's Visit     Future Labs/Procedures Expected by Expires    FERRITIN  4/18/2017 6/17/2018    Polysomnogram (CPAP will be added if patient meets diagnostic criteria.)  As directed 4/18/2018      Instructions    Delayed bed time to 11 pm  Wake time 7 am    Thank You    Thank you for choosing the PULMONARY MEDICINE DEPARTMENT at [facility]. At [facility], your satisfaction is our priority. You may receive a survey asking about your experience with us. We would appreciate you taking the time to complete and return the survey. Our goal is to provide you with a level 5 or Very Good experience. We thank you for allowing us to serve you and value your feedback.    Your Nurse/Medical Assistant: ___Kenneth Boothe *___________________________    Sincerely,  Pulmonary Department  Phone: [phone]  Fax: [phone]     Your provider has scheduled you for a sleep study.   You should be receiving a phone call from the sleep lab shortly after your study has been approved by your insurance. Please make sure you have your current phone numbers in the [facility] system. If you do not hear from anyone in the next 10 business days, please call the sleep lab at [phone] to schedule your sleep study. The sleep studies are performed at [facility] seven nights a week.  When you are " scheduling your sleep study, they will also make you a follow up appointment with your provider. This follow up appointment will be 10-14 days after your sleep study to review the results. If it is noted that you do have sleep apnea on your initial sleep study, you may receive a call back for a second night study with the CPAP before you come back to the office.        Language Assistance Services     ATTENTION: Language assistance services are available, free of charge. Please call [phone].      ATENCIÓN: Si habla español, tiene a cordoba disposición servicios gratuitos de asistencia lingüística. Llame al [phone].     [facility] Ý: N?u b?n nói Ti?ng Vi?t, có các d?ch v? h? tr? ngôn ng? mi?n phí dành cho b?n. G?i s? [phone].         [facility] complies with applicable Federal civil rights laws and does not discriminate on the basis of race, color, national origin, age, disability, or sex.

## 2017-04-23 RX ORDER — PEN NEEDLE, DIABETIC 31 G X1/4"
NEEDLE, DISPOSABLE MISCELLANEOUS
Qty: 150 EACH | Refills: 0 | Status: SHIPPED | OUTPATIENT
Start: 2017-04-23 | End: 2017-09-13 | Stop reason: SDUPTHER

## 2017-04-26 ENCOUNTER — HOSPITAL ENCOUNTER (OUTPATIENT)
Dept: SLEEP MEDICINE | Facility: HOSPITAL | Age: 47
Discharge: HOME | End: 2017-04-26
Attending: INTERNAL MEDICINE
Payer: COMMERCIAL

## 2017-04-26 DIAGNOSIS — Z79.4 CONTROLLED TYPE 2 DIABETES MELLITUS WITHOUT COMPLICATION, WITH LONG-TERM CURRENT USE OF INSULIN: ICD-10-CM

## 2017-04-26 DIAGNOSIS — E11.9 CONTROLLED TYPE 2 DIABETES MELLITUS WITHOUT COMPLICATION, WITH LONG-TERM CURRENT USE OF INSULIN: ICD-10-CM

## 2017-04-26 DIAGNOSIS — E66.01 MORBID OBESITY, UNSPECIFIED OBESITY TYPE: ICD-10-CM

## 2017-04-26 DIAGNOSIS — G47.00 INSOMNIA, UNSPECIFIED TYPE: ICD-10-CM

## 2017-04-26 DIAGNOSIS — I82.432 ACUTE DEEP VEIN THROMBOSIS (DVT) OF POPLITEAL VEIN OF LEFT LOWER EXTREMITY: ICD-10-CM

## 2017-04-26 DIAGNOSIS — G47.33 OSA (OBSTRUCTIVE SLEEP APNEA): Chronic | ICD-10-CM

## 2017-04-26 PROCEDURE — 95811 POLYSOM 6/>YRS CPAP 4/> PARM: CPT | Mod: 26,,, | Performed by: INTERNAL MEDICINE

## 2017-04-26 PROCEDURE — 95811 POLYSOM 6/>YRS CPAP 4/> PARM: CPT | Performed by: INTERNAL MEDICINE

## 2017-04-26 PROCEDURE — 95810 POLYSOM 6/> YRS 4/> PARAM: CPT

## 2017-04-26 NOTE — Clinical Note
"SUMMARY STATEMENTS: 1. Findings related to sleep diagnoses: " Moderate snoring. " Apnea hypopnea index was 103.3 events per hour total events 248. " Oxygen saturations were below ? 88% for 55.1% of the time spent asleep " Patient met split-night criteria. CPAP was initiated at 4.0cm, with a full face medium mask.  C-flex (set to 3) and heated humidification were used throughout. " Optimal CPAP titration CPAP 2. EEG abnormalities: " Sleep latency was significantly delayed patient was watching electronic devices " REM latency was short.  Wake after sleep onset was very low.  No slow-wave sleep.  No alpha intrusion. " The commencement of CPAP there was rebound REM sleep.  Sleep efficiency improved from 65.7% to 96.1%. " Arousal index dropped from 100.0 events per hour to 7.9 events per hour. " No periodic limb movements 3. ECG abnormalities: " Average heart rate was 70 bpm.  INTERPRETATION:   1. Very severe obstructive sleep apnea-hypopnea syndrome. 2. Morbid obesity based on BMI 3. Adequate "

## 2017-04-28 ENCOUNTER — PATIENT MESSAGE (OUTPATIENT)
Dept: SLEEP MEDICINE | Facility: HOSPITAL | Age: 47
End: 2017-04-28

## 2017-04-28 DIAGNOSIS — G47.33 OSA (OBSTRUCTIVE SLEEP APNEA): Primary | Chronic | ICD-10-CM

## 2017-04-28 DIAGNOSIS — Z12.31 OTHER SCREENING MAMMOGRAM: ICD-10-CM

## 2017-04-28 NOTE — PROCEDURES
"SUMMARY STATEMENTS:  1. Findings related to sleep diagnoses:   Moderate snoring.   Apnea hypopnea index was 103.3 events per hour total events 248.   Oxygen saturations were below ? 88% for 55.1% of the time spent asleep   Patient met split-night criteria. CPAP was initiated at 4.0cm, with a full face medium mask.  C-flex (set to 3) and heated humidification were used throughout.   Optimal CPAP titration CPAP  2. EEG abnormalities:   Sleep latency was significantly delayed patient was watching electronic devices   REM latency was short.  Wake after sleep onset was very low.  No slow-wave sleep.  No alpha intrusion.   The commencement of CPAP there was rebound REM sleep.  Sleep efficiency improved from 65.7% to 96.1%.   Arousal index dropped from 100.0 events per hour to 7.9 events per hour.   No periodic limb movements  3. ECG abnormalities:   Average heart rate was 70 bpm.    INTERPRETATION:     1. Very severe obstructive sleep apnea-hypopnea syndrome.  2. Morbid obesity based on BMI  3. Adequate CPAP titration.  CPAP 8 cm water pressure adequate  4. REM rebound phenomena with titration    RECOMMENDATIONS:     1. CPAP 8 cm water pressure with suitable mask  2. Weight loss exercise consider bariatric surgery.      See imported Sleep Study result in "Chart Review" under the   "Media tab".      (This Sleep Study was interpreted by a Board Certified Sleep   Specialist who conducted an epoch-by-epoch review of the entire   raw data recording.)     (The indication for this sleep study was reviewed and deemed   appropriate by AASM Practice Parameters or other reasons by a   Board Certified Sleep Specialist.)    Delano Campbell MD      "

## 2017-05-04 ENCOUNTER — ANTI-COAG VISIT (OUTPATIENT)
Dept: CARDIOLOGY | Facility: CLINIC | Age: 47
End: 2017-05-04
Payer: COMMERCIAL

## 2017-05-04 DIAGNOSIS — Z79.01 LONG TERM (CURRENT) USE OF ANTICOAGULANTS: Primary | ICD-10-CM

## 2017-05-04 LAB — INR PPP: 1.8 (ref 2–3)

## 2017-05-04 PROCEDURE — 85610 PROTHROMBIN TIME: CPT | Mod: QW,S$GLB,,

## 2017-05-04 PROCEDURE — 99211 OFF/OP EST MAY X REQ PHY/QHP: CPT | Mod: 25,S$GLB,,

## 2017-05-04 NOTE — PROGRESS NOTES
INR today is slightly sub-therapeutic. Patient confirms dose and compliance. Recent labs revealed low ferritin levels. No other changes in health, no changes in medications. Will re-challenge dose until follow-up.

## 2017-05-04 NOTE — MR AVS SNAPSHOT
[facility]  [address]  [place] [address]  Phone: [phone]  Fax: [phone]                  Clarissa Turner   2017 8:45 AM   Anti-coag visit    Description:  Female : 1970   Provider:  Anuja Jeong PharmD   Department:  [facility]           Diagnoses this Visit        Comments    Long term (current) use of anticoagulants    -  Primary            To Do List           Future Appointments        Provider Department Dept Phone    2017 8:45 AM Anuja Jeong PharmD [facility] [phone]    2017 8:20 AM Delano Campbell MD [facility] [phone]    2017 8:45 AM Anuja Jeong PharmD [facility] [phone]    2017 9:20 AM Beto Garcia MD [facility] - Internal Medicine [phone]    2017 9:40 AM Domenico Arreguin MD [facility] Hemotology Oncology [phone]      Goals (5 Years of Data)     None      [facility]     [facility] -  Assistance  Unless otherwise directed by your provider, please contact [facility], our nurse care line that is available for  assistance.     Registered nurses in the [facility] provide: appointment scheduling, clinical advisement, health education, and other advisory services.  Call: [phone] (toll free)               Medications           Message regarding Medications     Verify the changes and/or additions to your medication regime listed below are the same as discussed with your clinician today.  If any of these changes or additions are incorrect, please notify your healthcare provider.             Verify that the below list of medications is an accurate representation of the medications you are currently taking.  If none reported, the list may be blank. If incorrect, please contact your healthcare provider. Carry this list with you in case of emergency.           Current Medications      "atorvastatin (LIPITOR) 80 MG tablet TAKE ONE TABLET BY MOUTH ONCE DAILY    blood sugar diagnostic Strp Use to check blood sugars twice daily.    blood-glucose meter (ONETOUCH VERIO IQ METER) kit Use as instructed    DOCUSATE CALCIUM (STOOL SOFTENER ORAL) Take 2 tablets by mouth as needed.     fluticasone (FLONASE) 50 mcg/actuation nasal spray 1 spray by Each Nare route once daily.    gabapentin (NEURONTIN) 300 MG capsule Take 600 mg by mouth every evening.     glipiZIDE (GLUCOTROL) 5 MG tablet Take 1 tablet (5 mg total) by mouth daily with breakfast.    HUMALOG KWIKPEN 100 unit/mL InPn pen Sliding Scale:    150-200- 4 units  201-250= 6 unit  251-300= 8 units  301 greater 10 units and caused    insulin glargine (LANTUS SOLOSTAR) 100 unit/mL (3 mL) InPn pen Inject 30 Units into the skin every evening.    lancets Misc Use to check blood sugars twice daily.    levothyroxine (SYNTHROID) 75 MCG tablet Take 1 tablet (75 mcg total) by mouth once daily. Avoid calcium, iron, food, or fiber within 1 hour of taking medication.    lidocaine (LIDODERM) 5 % APPLY 1 PATCH FOR 12 HOURS THEN 12 HOURS OFF PRN P    loratadine (CLARITIN) 10 mg tablet Take 1 tablet (10 mg total) by mouth once daily.    losartan (COZAAR) 25 MG tablet Take 1 tablet (25 mg total) by mouth once daily.    metformin (GLUCOPHAGE-XR) 500 MG 24 hr tablet TAKE ONE TABLET BY MOUTH TWICE DAILY WITH MEALS    pantoprazole (PROTONIX) 40 MG tablet Take 40 mg by mouth once daily.    PEN NEEDLE 31 gauge x 1/4" Ndle USE ONE  4 TIMES DAILY WITH MEALS AND NIGHTLY    trazodone (DESYREL) 100 MG tablet Take 1 tablet (100 mg total) by mouth every evening.    warfarin (COUMADIN) 10 MG tablet Take 2 tablets daily except 1 tablet on Monday, Tuesdays, Wednesday as directed by the coumadin clinic. Discontinue 6mg tablet.           Clinical Reference Information           Your Vitals Were     Last Period                   04/14/2015 (Exact Date)           Allergies as of 5/4/2017     " Ace Inhibitors      Immunizations Administered on Date of Encounter - 5/4/2017     None      Orders Placed During Today's Visit      Normal Orders This Visit    POCT INR          5/4/2017  8:34 AM - Anuja Jeong, PharmD      Component Results     Component Value Flag Ref Range Units Status    INR 1.8 (A) 2.0 - 3.0  Final      April 2017 Details    Sun Mon Tue Wed Thu Fri Sat           1                 2               3               4      12 mg         5      12 mg         6   1.5   20 mg   See details      7      20 mg         8      20 mg           9      20 mg         10      10 mg         11      10 mg         12      10 mg         13   2.3   20 mg   See details      14      20 mg         15      20 mg           16      20 mg         17      10 mg         18      10 mg         19      10 mg         20      20 mg         21      20 mg         22      20 mg           23      20 mg         24      10 mg         25      10 mg         26      10 mg         27      20 mg         28      20 mg         29      20 mg           30      20 mg                Date Details   04/06 INR: 1.5      04/13 Last INR check   INR: 2.3                 May 2017 Details    Sun Mon Tue Wed Thu Fri Sat      1      10 mg         2      10 mg         3      10 mg         4   1.8   20 mg   See details      5      20 mg         6      20 mg           7      20 mg         8      10 mg         9      10 mg         10      10 mg         11      20 mg         12      20 mg         13      20 mg           14      20 mg         15      10 mg         16      10 mg         17      10 mg         18      20 mg         19      20 mg         20      20 mg           21      20 mg         22      10 mg         23      10 mg         24      10 mg         25      20 mg         26      20 mg         27      20 mg           28      20 mg         29      10 mg         30      10 mg         31      10 mg             Date Details   05/04 This INR check    INR: 1.8                     How to take your warfarin dose     To take:  10 mg Take 1 of the 10 mg tablets.    To take:  20 mg Take 2 of the 10 mg tablets.           June 2017 Details    Sun Mon Tue Wed Thu Fri Sat         1            2               3                 4               5               6               7               8               9               10                 11               12               13               14               15               16               17                 18               19               20               21               22               23               24                 25               26               27               28               29               30                 Date Details   No additional details    Date of next INR:  6/1/2017         How to take your warfarin dose     To take:  20 mg Take 2 of the 10 mg tablets.           Anticoagulation Summary as of 5/4/2017     Maintenance plan 10 mg (10 mg x 1) on Mon, Tue, Wed; 20 mg (10 mg x 2) all other days    Full instructions 10 mg on Mon, Tue, Wed; 20 mg all other days    Next INR check 6/1/2017      Anticoagulation Episode Summary     Comments       Patient Findings     Negatives Signs/symptoms of thrombosis, Signs/symptoms of bleeding, Laboratory test error suspected, Change in health, Change in alcohol use, Change in activity, Upcoming invasive procedure, Emergency department visit, Upcoming dental procedure, Missed doses, Extra doses, Change in medications, Change in diet/appetite, Hospital admission, Bruising, Other complaints      Language Assistance Services     ATTENTION: Language assistance services are available, free of charge. Please call [phone].      ATENCIÓN: Si habla estephania, tiene a cordoba disposición servicios gratuitos de asistencia lingüística. Llame al [phone].     [facility] Ý: N?u b?n nói Ti?ng Vi?t, có các d?ch v? h? tr? ngôn ng? mi?n phí dành cho b?n. G?i s? [phone].          [facility] complies with applicable Federal civil rights laws and does not discriminate on the basis of race, color, national origin, age, disability, or sex.

## 2017-05-17 ENCOUNTER — TELEPHONE (OUTPATIENT)
Dept: INTERNAL MEDICINE | Facility: CLINIC | Age: 47
End: 2017-05-17

## 2017-05-17 DIAGNOSIS — E03.4 HYPOTHYROIDISM DUE TO ACQUIRED ATROPHY OF THYROID: ICD-10-CM

## 2017-05-17 DIAGNOSIS — E11.69 DIABETES MELLITUS TYPE 2 IN OBESE: ICD-10-CM

## 2017-05-17 DIAGNOSIS — E66.9 DIABETES MELLITUS TYPE 2 IN OBESE: ICD-10-CM

## 2017-05-17 RX ORDER — LEVOTHYROXINE SODIUM 75 UG/1
75 TABLET ORAL DAILY
Qty: 90 TABLET | Refills: 2 | Status: SHIPPED | OUTPATIENT
Start: 2017-05-17

## 2017-05-17 RX ORDER — INSULIN PUMP SYRINGE, 3 ML
EACH MISCELLANEOUS
Qty: 1 EACH | Refills: 0 | Status: SHIPPED | OUTPATIENT
Start: 2017-05-17

## 2017-05-17 RX ORDER — INSULIN LISPRO 100 [IU]/ML
INJECTION, SOLUTION INTRAVENOUS; SUBCUTANEOUS
Qty: 1 BOX | Refills: 1 | Status: SHIPPED | OUTPATIENT
Start: 2017-05-17 | End: 2017-08-11 | Stop reason: SDUPTHER

## 2017-05-17 RX ORDER — FLUTICASONE PROPIONATE 50 MCG
1 SPRAY, SUSPENSION (ML) NASAL DAILY
Qty: 1 BOTTLE | Refills: 3 | Status: SHIPPED | OUTPATIENT
Start: 2017-05-17 | End: 2018-06-28 | Stop reason: SDUPTHER

## 2017-05-17 RX ORDER — INSULIN GLARGINE 100 [IU]/ML
30 INJECTION, SOLUTION SUBCUTANEOUS NIGHTLY
Qty: 2 BOX | Refills: 1 | Status: SHIPPED | OUTPATIENT
Start: 2017-05-17

## 2017-05-17 NOTE — TELEPHONE ENCOUNTER
[facility] pharmacist called asking to speak with you in regards to patient prescription for Humalog Kwikpen sliding scale. They were asking to speak with you directly and would not inform me of what was needed. She stated that call [phone] and ask for the pharmacist on duty.

## 2017-05-18 DIAGNOSIS — J32.9 SINOBRONCHITIS: ICD-10-CM

## 2017-05-18 DIAGNOSIS — E66.9 DIABETES MELLITUS TYPE 2 IN OBESE: ICD-10-CM

## 2017-05-18 DIAGNOSIS — J40 SINOBRONCHITIS: ICD-10-CM

## 2017-05-18 DIAGNOSIS — E11.69 DIABETES MELLITUS TYPE 2 IN OBESE: ICD-10-CM

## 2017-05-18 RX ORDER — INSULIN PUMP SYRINGE, 3 ML
EACH MISCELLANEOUS
Qty: 120 EACH | Refills: 11 | Status: CANCELLED | OUTPATIENT
Start: 2017-05-18

## 2017-05-18 RX ORDER — METFORMIN HYDROCHLORIDE 500 MG/1
500 TABLET, EXTENDED RELEASE ORAL 2 TIMES DAILY WITH MEALS
Qty: 60 TABLET | Refills: 6 | Status: SHIPPED | OUTPATIENT
Start: 2017-05-18 | End: 2017-07-26 | Stop reason: ALTCHOICE

## 2017-05-18 NOTE — TELEPHONE ENCOUNTER
Patient needs a script for a diabetic meter, test strips and lancets. Will fax to [facility] pharmacy

## 2017-05-18 NOTE — TELEPHONE ENCOUNTER
----- Message from Kayley Medina sent at 5/18/2017 12:29 PM CDT -----  Contact: pt   Call pt regarding her new insulin machine not being approved.   ..[phone] (home)

## 2017-05-19 ENCOUNTER — TELEPHONE (OUTPATIENT)
Dept: INTERNAL MEDICINE | Facility: CLINIC | Age: 47
End: 2017-05-19

## 2017-05-19 NOTE — TELEPHONE ENCOUNTER
----- Message from Charlotte Waddell sent at 5/19/2017  9:49 AM CDT -----  Contact: pt  States she needs to speak to the nurse regarding her prescription that was called in yesterday. Please call pt at [phone]. Thank you

## 2017-05-19 NOTE — TELEPHONE ENCOUNTER
----- Message from Annelise Alvarez sent at 5/19/2017  4:10 PM CDT -----  Contact: [facility]  States have a question regarding the HUMALOG KWIKPEN  Rx. Please adv/call     Pt use..    [id number] [place] - [address]  [address] [place] [address]  Phone: [phone] Fax: [phone]

## 2017-05-19 NOTE — TELEPHONE ENCOUNTER
Pharmacy is calling for a max dose on sliding scale Humalog. Also written script needed for diabetic meter, lancets and test strips to be faxed to [facility]

## 2017-05-24 ENCOUNTER — PATIENT MESSAGE (OUTPATIENT)
Dept: INTERNAL MEDICINE | Facility: CLINIC | Age: 47
End: 2017-05-24

## 2017-05-26 RX ORDER — PANTOPRAZOLE SODIUM 40 MG/1
40 TABLET, DELAYED RELEASE ORAL DAILY
Qty: 30 TABLET | Refills: 3 | Status: SHIPPED | OUTPATIENT
Start: 2017-05-26

## 2017-05-30 ENCOUNTER — TELEPHONE (OUTPATIENT)
Dept: PULMONOLOGY | Facility: CLINIC | Age: 47
End: 2017-05-30

## 2017-05-31 ENCOUNTER — OFFICE VISIT (OUTPATIENT)
Dept: PULMONOLOGY | Facility: CLINIC | Age: 47
End: 2017-05-31
Payer: COMMERCIAL

## 2017-05-31 ENCOUNTER — PATIENT OUTREACH (OUTPATIENT)
Dept: ADMINISTRATIVE | Facility: HOSPITAL | Age: 47
End: 2017-05-31
Payer: COMMERCIAL

## 2017-05-31 VITALS
RESPIRATION RATE: 18 BRPM | HEIGHT: 65 IN | WEIGHT: 293 LBS | DIASTOLIC BLOOD PRESSURE: 62 MMHG | BODY MASS INDEX: 48.82 KG/M2 | OXYGEN SATURATION: 97 % | SYSTOLIC BLOOD PRESSURE: 120 MMHG | HEART RATE: 107 BPM

## 2017-05-31 DIAGNOSIS — G47.33 OSA (OBSTRUCTIVE SLEEP APNEA): Primary | Chronic | ICD-10-CM

## 2017-05-31 PROCEDURE — 99999 PR PBB SHADOW E&M-EST. PATIENT-LVL IV: CPT | Mod: PBBFAC,,, | Performed by: INTERNAL MEDICINE

## 2017-05-31 PROCEDURE — 99214 OFFICE O/P EST MOD 30 MIN: CPT | Mod: S$GLB,,, | Performed by: INTERNAL MEDICINE

## 2017-05-31 NOTE — PATIENT INSTRUCTIONS
What Are CPAP and Other Air Pressure Treatments?   Continuous positive air pressure (CPAP) uses gentle air pressure to hold the airway open. CPAP is often the most effective treatment for sleep apnea and severe snoring. It works very well for many people. But keep in mind that it can take several adjustments before the setup is right for you.   How CPAP Works   A small portable pump beside the bed sends air through a hose, which is held over your nose by a mask. Air is gently pushed through your airway. The air pressure nudges sagging tissues aside. This widens the airway so you can breathe better. CPAP may be combined with other kinds of therapy for sleep apnea.      A mask over the nose gently directs air into the throat to keep the airway open.      Types of Air Pressure Treatments   There are different types of CPAP. Your doctor or CPAP technician will help you decide which type is best for you:   Basic CPAP keeps the pressure constant all night long.   A bilevel device gives out more pressure when you breathe in and less when you breathe out.   An autoCPAP device automatically adjusts pressure throughout the night and in response to changes such as body position, sleep stage, and snoring.      Please call office [phone] for any questions    Thank You    Thank you for choosing the PULMONARY MEDICINE DEPARTMENT at [facility]. At [facility], your satisfaction is our priority. You may receive a survey asking about your experience with us. We would appreciate you taking the time to complete and return the survey. Our goal is to provide you with a level 5 or Very Good experience. We thank you for allowing us to serve you and value your feedback.    Your Nurse/Medical Assistant: ___Kenneth Knight___________________________    Sincerely,  Pulmonary Department  Phone: [phone]  Fax: [phone]

## 2017-06-01 ENCOUNTER — ANTI-COAG VISIT (OUTPATIENT)
Dept: CARDIOLOGY | Facility: CLINIC | Age: 47
End: 2017-06-01
Payer: COMMERCIAL

## 2017-06-01 DIAGNOSIS — Z79.01 LONG TERM (CURRENT) USE OF ANTICOAGULANTS: Primary | ICD-10-CM

## 2017-06-01 LAB — INR PPP: 1.8 (ref 2–3)

## 2017-06-01 PROCEDURE — 99211 OFF/OP EST MAY X REQ PHY/QHP: CPT | Mod: 25,S$GLB,,

## 2017-06-01 PROCEDURE — 85610 PROTHROMBIN TIME: CPT | Mod: QW,S$GLB,,

## 2017-06-01 NOTE — PROGRESS NOTES
INR remains at 1.8. Patient confirms dose and compliance. Humalog dose adjustment, no other changes noted. Will increase total weekly dose until follow-up.

## 2017-06-12 ENCOUNTER — OFFICE VISIT (OUTPATIENT)
Dept: HEMATOLOGY/ONCOLOGY | Facility: CLINIC | Age: 47
End: 2017-06-12
Payer: COMMERCIAL

## 2017-06-12 ENCOUNTER — ANTI-COAG VISIT (OUTPATIENT)
Dept: CARDIOLOGY | Facility: CLINIC | Age: 47
End: 2017-06-12
Payer: COMMERCIAL

## 2017-06-12 VITALS
BODY MASS INDEX: 48.82 KG/M2 | WEIGHT: 293 LBS | DIASTOLIC BLOOD PRESSURE: 82 MMHG | HEIGHT: 65 IN | HEART RATE: 99 BPM | TEMPERATURE: 97 F | RESPIRATION RATE: 18 BRPM | OXYGEN SATURATION: 100 % | SYSTOLIC BLOOD PRESSURE: 142 MMHG

## 2017-06-12 DIAGNOSIS — Z79.01 LONG TERM (CURRENT) USE OF ANTICOAGULANTS: Primary | ICD-10-CM

## 2017-06-12 DIAGNOSIS — I82.5Z2 CHRONIC DEEP VEIN THROMBOSIS (DVT) OF DISTAL VEIN OF LEFT LOWER EXTREMITY: Primary | ICD-10-CM

## 2017-06-12 LAB — INR PPP: 2.4 (ref 2–3)

## 2017-06-12 PROCEDURE — 99999 PR PBB SHADOW E&M-EST. PATIENT-LVL IV: CPT | Mod: PBBFAC,,, | Performed by: INTERNAL MEDICINE

## 2017-06-12 PROCEDURE — 85610 PROTHROMBIN TIME: CPT | Mod: QW,S$GLB,,

## 2017-06-12 PROCEDURE — 99214 OFFICE O/P EST MOD 30 MIN: CPT | Mod: S$GLB,,, | Performed by: INTERNAL MEDICINE

## 2017-06-12 PROCEDURE — 99211 OFF/OP EST MAY X REQ PHY/QHP: CPT | Mod: 25,S$GLB,,

## 2017-06-12 RX ORDER — ENOXAPARIN SODIUM 150 MG/ML
1 INJECTION SUBCUTANEOUS 2 TIMES DAILY
Qty: 14 ML | Refills: 1 | Status: SHIPPED | OUTPATIENT
Start: 2017-06-12 | End: 2017-07-13 | Stop reason: SDUPTHER

## 2017-06-12 NOTE — PROGRESS NOTES
Subjective:       Patient ID: Clarissa Turner is a 47 y.o. female.    Chief Complaint: Deep Vein Thrombosis (Hx)    HPI This is a 46-year-old  lady who comes for follow up of her anticoagulation.  She is   known to us because of a history of recurrent DVTs and pulmonary   embolism. The patient has had several episodes of DVT in the past. In 2004,   she had a DVT in the left leg after she had left hip replacement. She was not   given anticoagulation. She had another one after another surgery a few months   later and she was placed on Coumadin for three years. The patient also says   that she had a pulmonary embolism in 2012. She was advised to be on Coumadin   for the rest of her life. She was placed on Coumadin in 2012, has been followed  by the Coumadin Clinic at our institution. She was in the therapeutic range   with 20 mg of Coumadin a day.  Because of the patient's work, it was difficult for her to come to have her INR   checked in the Coumadin Clinic and eventually, her Coumadin was changed to   Xarelto, which she took until recently.  She became concerned about about potential problems  with Xarelto and asked to go back to Coumadin.     She underwent a left knee surgery in mid 2016 . She was bridged with Lovenox.  She developed a left knee hemarthrosis and her anticoagulation was held for a few days with the development of a left popliteal DVT.  She was admitted, and anticoagulation restarted.       She developed a hematoma following a hernia repar in December 2016. The wound beame infected and she had a prolonged stay in the Hospital from 12/2016 to 2/107.  She comes for follow up of her anticoagulation.   She si about to have a left hip revision at the [facility] on Tuesday June 22  INR today is 2.4  .ALLERGIES: The patient is allergic to ACE inhibitors, that makes her cough.  MEDICATIONS: See MedCard.  PREVIOUS SURGERIES: Bilateral hip replacement. Revision of the left hip in    August 2014. A gallbladder removal.  SOCIAL HISTORY: She is  with one daughter. She lives in [place].   She does not smoke or drink. 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 Maternal grandmother, mother, father and paternal   grandmother had diabetes. Mother and grandmother had congestive heart failure.  PAST MEDICAL HISTORY:  1. Status post bilateral hip replacement.  2. History of recurrent DVT/PE.  3. Reflux.  4. High blood pressure.  5. Diabetes mellitus.  6. Arthritis of knees and hips.  7. Obesity.  Review of Systems   Constitutional: Negative.  Negative for appetite change and unexpected weight change.   HENT: Negative.    Eyes: Negative.  Negative for visual disturbance.   Respiratory: Negative.  Negative for cough, shortness of breath and wheezing.    Cardiovascular: Negative.  Negative for chest pain.   Gastrointestinal: Negative.  Negative for abdominal pain and diarrhea.   Genitourinary: Negative.  Negative for frequency.   Musculoskeletal: Negative for back pain.   Skin: Negative for rash.   Neurological: Negative.  Negative for headaches.   Hematological: Negative for adenopathy.   Psychiatric/Behavioral: Negative.  The patient is not nervous/anxious.        Objective:      Physical Exam   Constitutional: She is oriented to person, place, and time. She appears well-developed. No distress.   obese   HENT:   Head: Normocephalic.   Right Ear: Tympanic membrane, external ear and ear canal normal.   Left Ear: Tympanic membrane, external ear and ear canal normal.   Nose: Nose normal. Right sinus exhibits no maxillary sinus tenderness and no frontal sinus tenderness. Left sinus exhibits no maxillary sinus tenderness and no frontal sinus tenderness.   Mouth/Throat: Oropharynx is clear and moist and mucous membranes are normal.   Teeth normal.  Gums normal.   Eyes: Conjunctivae and lids are normal.  Pupils are equal, round, and reactive to light.   Neck: Normal carotid pulses, no hepatojugular reflux and no JVD present. Carotid bruit is not present. No tracheal deviation present. No thyroid mass and no thyromegaly present.   Cardiovascular: Normal rate, regular rhythm, S1 normal, S2 normal, normal heart sounds and intact distal pulses.  Exam reveals no gallop and no friction rub.    No murmur heard.  Carotid exam normal   Pulmonary/Chest: Effort normal and breath sounds normal. No accessory muscle usage. No respiratory distress. She has no wheezes. She has no rales. She exhibits no tenderness.   Abdominal: Soft. Normal appearance. She exhibits no distension and no mass. There is no splenomegaly or hepatomegaly. There is no tenderness. There is no rebound and no guarding.   Musculoskeletal: Normal range of motion. She exhibits no edema or tenderness.        Right hand: Normal.        Left hand: Normal.       Lymphadenopathy:     She has no cervical adenopathy.     She has no axillary adenopathy.        Right: No inguinal and no supraclavicular adenopathy present.        Left: No inguinal and no supraclavicular adenopathy present.   Neurological: She is alert and oriented to person, place, and time. She has normal strength. No cranial nerve deficit. Coordination normal.   Skin: Skin is warm and dry. No rash noted. She is not diaphoretic. No cyanosis or erythema. No pallor. Nails show no clubbing.   Psychiatric: She has a normal mood and affect. Her behavior is normal. Judgment and thought content normal.       Wt Readings from Last 3 Encounters:   06/12/17 (!) 138.5 kg (305 lb 5.4 oz)   05/31/17 (!) 139.5 kg (307 lb 8.7 oz)   04/18/17 134 kg (295 lb 6.7 oz)     Temp Readings from Last 3 Encounters:   06/12/17 97 °F (36.1 °C)   03/09/17 98.4 °F (36.9 °C) (Oral)   03/08/17 97.9 °F (36.6 °C) (Tympanic)     BP Readings from Last 3 Encounters:   06/12/17 (!) 142/82   05/31/17 120/62   04/18/17 110/63     Pulse  Readings from Last 3 Encounters:   06/12/17 99   05/31/17 107   04/18/17 104       Assessment:       1. Chronic deep vein thrombosis (DVT) of distal vein of left lower extremity        Plan:       She will be asked to take coumadin at her same current doses until Thursday Linda 15 included.  Start lovenox 140 mg sq bid on Friday June 16.  Last dose of Lovenox will be on Monday June 19 at 12 noon  She gomez ee me Modnay June 19 with a stat INR  She is due to restart both Lovenox and coumadin   the day after surgery.  She should see me 5 days after being discharged with a stat INR

## 2017-06-12 NOTE — PROGRESS NOTES
INR is now therapeutic. This is a quick follow-up after a sub-therapeutic on 6/1. Revised hip replacement 6/20, holding instructions from [facility] today.Continue dose and diet until follow-up.

## 2017-06-15 ENCOUNTER — OFFICE VISIT (OUTPATIENT)
Dept: INTERNAL MEDICINE | Facility: CLINIC | Age: 47
End: 2017-06-15
Payer: COMMERCIAL

## 2017-06-15 VITALS
OXYGEN SATURATION: 99 % | HEART RATE: 110 BPM | BODY MASS INDEX: 48.82 KG/M2 | DIASTOLIC BLOOD PRESSURE: 98 MMHG | WEIGHT: 293 LBS | TEMPERATURE: 97 F | SYSTOLIC BLOOD PRESSURE: 148 MMHG | HEIGHT: 65 IN

## 2017-06-15 DIAGNOSIS — Z00.00 ANNUAL PHYSICAL EXAM: Primary | ICD-10-CM

## 2017-06-15 DIAGNOSIS — M25.552 LEFT HIP PAIN: ICD-10-CM

## 2017-06-15 DIAGNOSIS — I82.5Z2 CHRONIC DEEP VEIN THROMBOSIS (DVT) OF DISTAL VEIN OF LEFT LOWER EXTREMITY: ICD-10-CM

## 2017-06-15 DIAGNOSIS — Z01.818 PRE-OP EXAMINATION: ICD-10-CM

## 2017-06-15 DIAGNOSIS — E03.4 HYPOTHYROIDISM DUE TO ACQUIRED ATROPHY OF THYROID: ICD-10-CM

## 2017-06-15 DIAGNOSIS — E66.01 MORBID OBESITY, UNSPECIFIED OBESITY TYPE: ICD-10-CM

## 2017-06-15 PROCEDURE — 99396 PREV VISIT EST AGE 40-64: CPT | Mod: S$GLB,,, | Performed by: FAMILY MEDICINE

## 2017-06-15 PROCEDURE — 99999 PR PBB SHADOW E&M-EST. PATIENT-LVL III: CPT | Mod: PBBFAC,,, | Performed by: FAMILY MEDICINE

## 2017-06-15 RX ORDER — METFORMIN HYDROCHLORIDE 750 MG/1
750 TABLET, EXTENDED RELEASE ORAL 2 TIMES DAILY WITH MEALS
Qty: 180 TABLET | Refills: 2 | Status: SHIPPED | OUTPATIENT
Start: 2017-06-15 | End: 2018-11-20

## 2017-06-15 NOTE — PROGRESS NOTES
Subjective:       Patient ID: Clarissa Turner is a 47 y.o. female.    Chief Complaint: No chief complaint on file.    Annual exam:      Pt is a 47 year old with DM, HTN and Hypothyroidism. History of DVT on coumadin and followed by Hematology. Undergoing surgery ion 20th of June. Pt has no history of CAD. Pt has had past history of general anesthesia and did well. Pt will have left hip revision of total hip replace.      Review of Systems   Constitutional: Negative.  Negative for activity change, chills, fatigue and fever.   HENT: Negative.  Negative for congestion, ear pain, postnasal drip, rhinorrhea, sinus pressure, sore throat and trouble swallowing.    Eyes: Negative for visual disturbance.   Respiratory: Negative.  Negative for cough, chest tightness, shortness of breath and wheezing.    Gastrointestinal: Negative.    Endocrine: Negative.  Negative for cold intolerance and heat intolerance.   Genitourinary: Negative.  Negative for dysuria, hematuria, urgency, vaginal bleeding and vaginal discharge.   Musculoskeletal: Negative for arthralgias, back pain, joint swelling and neck stiffness.   Skin: Negative for color change and rash.   Neurological: Negative.  Negative for dizziness, tremors, seizures, syncope, weakness, light-headedness and headaches.   Hematological: Negative.    Psychiatric/Behavioral: Negative.  Negative for agitation, confusion, sleep disturbance and suicidal ideas. The patient is not nervous/anxious.        Objective:      Physical Exam   Constitutional: She is oriented to person, place, and time. She appears well-developed and well-nourished.   Cardiovascular: Normal rate and regular rhythm.    Pulmonary/Chest: Effort normal and breath sounds normal.   Abdominal: Soft. Bowel sounds are normal.   Neurological: She is alert and oriented to person, place, and time.   Skin: Skin is warm and dry.   Psychiatric: She has a normal mood and affect. Her behavior is normal.       Assessment:        1. Annual physical exam    2. Pre-op examination    3. Uncontrolled type 2 diabetes mellitus without complication, with long-term current use of insulin    4. Hypothyroidism due to acquired atrophy of thyroid    5. Morbid obesity, unspecified obesity type    6. Chronic deep vein thrombosis (DVT) of distal vein of left lower extremity    7. Left hip pain        Plan:       Annual physical exam  Comments:  Pt is DM, HTN who has nearly good control over her sugars. She has stopped coumadin and transitioned to lovenox. No history CAD. Cleared for surgery.    Pre-op examination  Comments:  Pt is cleared for surgery. Recommend increase movment as quickly as possible post surgery to decrease clot risk. Not reviewed at this time labs as done.    Uncontrolled type 2 diabetes mellitus without complication, with long-term current use of insulin  Comments:  A1C 7.1. Discussed infection and slow healing time    Hypothyroidism due to acquired atrophy of thyroid  Comments:  Controlled    Morbid obesity, unspecified obesity type  Comments:  Discussed better exericse post surgery    Chronic deep vein thrombosis (DVT) of distal vein of left lower extremity  Comments:  Pt has stopped Coumadin and no on Lovenox. Hematology will be involved in reinitiating coumadin post surgery    Left hip pain  Comments:  Pt is cleared for surgery

## 2017-06-19 ENCOUNTER — LAB VISIT (OUTPATIENT)
Dept: LAB | Facility: HOSPITAL | Age: 47
End: 2017-06-19
Attending: INTERNAL MEDICINE
Payer: COMMERCIAL

## 2017-06-19 ENCOUNTER — OFFICE VISIT (OUTPATIENT)
Dept: HEMATOLOGY/ONCOLOGY | Facility: CLINIC | Age: 47
End: 2017-06-19
Payer: COMMERCIAL

## 2017-06-19 VITALS
DIASTOLIC BLOOD PRESSURE: 98 MMHG | TEMPERATURE: 98 F | HEART RATE: 92 BPM | OXYGEN SATURATION: 97 % | BODY MASS INDEX: 48.82 KG/M2 | WEIGHT: 293 LBS | HEIGHT: 65 IN | SYSTOLIC BLOOD PRESSURE: 142 MMHG

## 2017-06-19 DIAGNOSIS — Z79.01 ANTICOAGULATION MONITORING, INR RANGE 2-3: ICD-10-CM

## 2017-06-19 DIAGNOSIS — Z86.711 HISTORY OF PULMONARY EMBOLISM: Primary | ICD-10-CM

## 2017-06-19 LAB
INR PPP: 1.1
PROTHROMBIN TIME: 11.1 SEC

## 2017-06-19 PROCEDURE — 85610 PROTHROMBIN TIME: CPT

## 2017-06-19 PROCEDURE — 99999 PR PBB SHADOW E&M-EST. PATIENT-LVL IV: CPT | Mod: PBBFAC,,, | Performed by: INTERNAL MEDICINE

## 2017-06-19 PROCEDURE — 99214 OFFICE O/P EST MOD 30 MIN: CPT | Mod: S$GLB,,, | Performed by: INTERNAL MEDICINE

## 2017-06-19 PROCEDURE — 36415 COLL VENOUS BLD VENIPUNCTURE: CPT | Mod: PO

## 2017-06-19 NOTE — PROGRESS NOTES
Subjective:       Patient ID: Clarissa Turner is a 47 y.o. female.    Chief Complaint: No chief complaint on file.    HPI This is a 46-year-old  lady who comes for follow up of her anticoagulation.  She is   known to us because of a history of recurrent DVTs and pulmonary   embolism. The patient has had several episodes of DVT in the past. In 2004,   she had a DVT in the left leg after she had left hip replacement. She was not   given anticoagulation. She had another one after another surgery a few months   later and she was placed on Coumadin for three years. The patient also says   that she had a pulmonary embolism in 2012. She was advised to be on Coumadin   for the rest of her life. She was placed on Coumadin in 2012, has been followed  by the Coumadin Clinic at our institution. She was in the therapeutic range   with 20 mg of Coumadin a day.  Because of the patient's work, it was difficult for her to come to have her INR   checked in the Coumadin Clinic and eventually, her Coumadin was changed to   Xarelto, which she took until recently.  She became concerned about about potential problems  with Xarelto and asked to go back to Coumadin.     She underwent a left knee surgery in mid 2016 . She was bridged with Lovenox.  She developed a left knee hemarthrosis and her anticoagulation was held for a few days with the development of a left popliteal DVT.  She was admitted, and anticoagulation restarted.       She developed a hematoma following a hernia repar in December 2016. The wound beame infected and she had a prolonged stay in the Hospital from 12/2016 to 2/107.  She comes for follow up of her anticoagulation.   She si about to have a left hip revision at the [facility] on Tuesday June 22  INR today is 2.4  .ALLERGIES: The patient is allergic to ACE inhibitors, that makes her cough.  MEDICATIONS: See MedCard.  PREVIOUS SURGERIES: Bilateral hip replacement. Revision of the left hip in    August 2014. A gallbladder removal.  SOCIAL HISTORY: She is  with one daughter. She lives in [place].   She does not smoke or drink. 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 Maternal grandmother, mother, father and paternal   grandmother had diabetes. Mother and grandmother had congestive heart failure.  PAST MEDICAL HISTORY:  1. Status post bilateral hip replacement.  2. History of recurrent DVT/PE.  3. Reflux.  4. High blood pressure.  5. Diabetes mellitus.  6. Arthritis of knees and hips.  7. Obesity.  Review of Systems   Constitutional: Negative.    HENT: Negative.    Eyes: Negative.    Respiratory: Negative.  Negative for cough and wheezing.    Cardiovascular: Negative.  Negative for chest pain.   Gastrointestinal: Negative.    Genitourinary: Negative.    Neurological: Negative.    Psychiatric/Behavioral: Negative.        Objective:      Physical Exam   Constitutional: She is oriented to person, place, and time. She appears well-developed. No distress.   HENT:   Head: Normocephalic.   Right Ear: Tympanic membrane and ear canal normal.   Left Ear: Tympanic membrane and ear canal normal.   Nose: Right sinus exhibits no maxillary sinus tenderness and no frontal sinus tenderness. Left sinus exhibits no maxillary sinus tenderness and no frontal sinus tenderness.   Mouth/Throat: Mucous membranes are normal.   Teeth normal.  Gums normal.   Eyes: Conjunctivae and lids are normal. Pupils are equal, round, and reactive to light.   Neck: Normal range of motion. Normal carotid pulses, no hepatojugular reflux and no JVD present. Carotid bruit is not present. No tracheal deviation present. No thyroid mass and no thyromegaly present.   Cardiovascular: Normal rate, S1 normal and S2 normal.  Exam reveals no gallop and no friction rub.    No murmur heard.  Carotid exam normal   Pulmonary/Chest: Effort normal. No accessory  muscle usage. No respiratory distress. She has no wheezes. She has no rales. She exhibits no tenderness.   Abdominal: Normal appearance. She exhibits no distension and no mass. There is no splenomegaly or hepatomegaly. There is no tenderness. There is no rebound and no guarding.   Musculoskeletal: Normal range of motion. She exhibits no edema or tenderness.        Right hand: Normal.        Left hand: Normal.       Lymphadenopathy:     She has no cervical adenopathy.     She has no axillary adenopathy.        Right: No inguinal and no supraclavicular adenopathy present.        Left: No inguinal and no supraclavicular adenopathy present.   Neurological: She is alert and oriented to person, place, and time. She has normal strength. No cranial nerve deficit. Coordination normal.   Skin: Skin is warm and dry. No rash noted. She is not diaphoretic. No cyanosis or erythema. No pallor. Nails show no clubbing.   Psychiatric: She has a normal mood and affect. Her behavior is normal. Judgment and thought content normal.       Wt Readings from Last 3 Encounters:   06/19/17 (!) 138.8 kg (306 lb)   06/15/17 (!) 141.2 kg (311 lb 4.6 oz)   06/12/17 (!) 138.5 kg (305 lb 5.4 oz)     Temp Readings from Last 3 Encounters:   06/19/17 98 °F (36.7 °C) (Oral)   06/15/17 96.7 °F (35.9 °C) (Tympanic)   06/12/17 97 °F (36.1 °C)     BP Readings from Last 3 Encounters:   06/19/17 (!) 142/98   06/15/17 (!) 148/98   06/12/17 (!) 142/82     Pulse Readings from Last 3 Encounters:   06/19/17 92   06/15/17 110   06/12/17 99       Assessment:       1. History of pulmonary embolism    2. Anticoagulation monitoring, INR range 2-3        Plan:       Lab Results   Component Value Date    INR 1.1 06/19/2017    INR 2.4 06/12/2017    INR 1.8 (A) 06/01/2017     Last dose of Lovenox will be on Monday June 19 at 12 noon  She will see me Modeznay June 19 with a stat INR  She is due to restart both Lovenox and coumadin   the day after surgery.  She should see me 5  days after being discharged with a stat INR

## 2017-07-12 ENCOUNTER — TELEPHONE (OUTPATIENT)
Dept: HEMATOLOGY/ONCOLOGY | Facility: CLINIC | Age: 47
End: 2017-07-12

## 2017-07-12 NOTE — TELEPHONE ENCOUNTER
----- Message from Marck Perez sent at 7/12/2017 10:52 AM CDT -----  Contact: pt  Pt is calling Nurse staff regarding a request for Lab orders and appt to be scheduled onr tomorrow Thursday 13th,2017. Pt call back to be advised [phone] thanks

## 2017-07-13 ENCOUNTER — OFFICE VISIT (OUTPATIENT)
Dept: HEMATOLOGY/ONCOLOGY | Facility: CLINIC | Age: 47
End: 2017-07-13
Payer: COMMERCIAL

## 2017-07-13 ENCOUNTER — LAB VISIT (OUTPATIENT)
Dept: LAB | Facility: HOSPITAL | Age: 47
End: 2017-07-13
Attending: INTERNAL MEDICINE
Payer: COMMERCIAL

## 2017-07-13 DIAGNOSIS — I82.5Z2 CHRONIC DEEP VEIN THROMBOSIS (DVT) OF DISTAL VEIN OF LEFT LOWER EXTREMITY: ICD-10-CM

## 2017-07-13 DIAGNOSIS — Z79.01 ANTICOAGULATION MONITORING, INR RANGE 2-3: ICD-10-CM

## 2017-07-13 DIAGNOSIS — Z86.711 HISTORY OF PULMONARY EMBOLISM: ICD-10-CM

## 2017-07-13 LAB
INR PPP: 1.3
PROTHROMBIN TIME: 14.5 SEC

## 2017-07-13 PROCEDURE — 85610 PROTHROMBIN TIME: CPT | Mod: PO

## 2017-07-13 PROCEDURE — 99214 OFFICE O/P EST MOD 30 MIN: CPT | Mod: S$GLB,,, | Performed by: INTERNAL MEDICINE

## 2017-07-13 PROCEDURE — 36415 COLL VENOUS BLD VENIPUNCTURE: CPT | Mod: PO

## 2017-07-13 RX ORDER — ENOXAPARIN SODIUM 150 MG/ML
135 INJECTION SUBCUTANEOUS 2 TIMES DAILY
Qty: 14 SYRINGE | Refills: 1 | Status: SHIPPED | OUTPATIENT
Start: 2017-07-13 | End: 2017-07-26 | Stop reason: ALTCHOICE

## 2017-07-13 NOTE — PROGRESS NOTES
Subjective:       Patient ID: Clarissa Turner is a 47 y.o. female.    Chief Complaint: No chief complaint on file.    HPI This is a 46-year-old  lady who comes for follow up of her anticoagulation.  She is   known to us because of a history of recurrent DVTs and pulmonary   embolism. The patient has had several episodes of DVT in the past. In 2004,   she had a DVT in the left leg after she had left hip replacement. She was not   given anticoagulation. She had another one after another surgery a few months   later and she was placed on Coumadin for three years. The patient also says   that she had a pulmonary embolism in 2012. She was advised to be on Coumadin   for the rest of her life. She was placed on Coumadin in 2012, has been followed  by the Coumadin Clinic at our institution. She was in the therapeutic range   with 20 mg of Coumadin a day.  Because of the patient's work, it was difficult for her to come to have her INR   checked in the Coumadin Clinic and eventually, her Coumadin was changed to   Xarelto, which she took until recently.  She became concerned about about potential problems  with Xarelto and asked to go back to Coumadin.     She underwent a left knee surgery in mid 2016 . She was bridged with Lovenox.  She developed a left knee hemarthrosis and her anticoagulation was held for a few days with the development of a left popliteal DVT.  She was admitted, and anticoagulation restarted.       She developed a hematoma following a hernia repar in December 2016. The wound beame infected and she had a prolonged stay in the Hospital from 12/2016 to 2/107.  She comes for follow up of her anticoagulation.   Sheh ad a right hip surgery June 20. Then she went to Rehab,.  She was discharged on coumadin oncly and she has been on it just for a few days.  Her INR is 1.3.ALLERGIES: The patient is allergic to ACE inhibitors, that makes her cough.  MEDICATIONS: See MedCard.  PREVIOUS  SURGERIES: Bilateral hip replacement. Revision of the left hip in   August 2014. A gallbladder removal.  SOCIAL HISTORY: She is  with one daughter. She lives in [place].   She does not smoke or drink. 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 Maternal grandmother, mother, father and paternal   grandmother had diabetes. Mother and grandmother had congestive heart failure.  PAST MEDICAL HISTORY:  1. Status post bilateral hip replacement.  2. History of recurrent DVT/PE.  3. Reflux.  4. High blood pressure.  5. Diabetes mellitus.  6. Arthritis of knees and hips.  7. Obesity.  Review of Systems   Constitutional: Negative.    HENT: Negative.    Eyes: Negative.    Respiratory: Negative.  Negative for cough and wheezing.    Cardiovascular: Negative.  Negative for chest pain.   Gastrointestinal: Negative.    Genitourinary: Negative.    Neurological: Negative.    Psychiatric/Behavioral: Negative.        Objective:      Physical Exam   Constitutional: She is oriented to person, place, and time. She appears well-developed. No distress.   HENT:   Head: Normocephalic.   Right Ear: Tympanic membrane, external ear and ear canal normal.   Left Ear: Tympanic membrane, external ear and ear canal normal.   Nose: Nose normal. Right sinus exhibits no maxillary sinus tenderness and no frontal sinus tenderness. Left sinus exhibits no maxillary sinus tenderness and no frontal sinus tenderness.   Mouth/Throat: Oropharynx is clear and moist and mucous membranes are normal.   Teeth normal.  Gums normal.   Eyes: Conjunctivae and lids are normal. Pupils are equal, round, and reactive to light.   Neck: Normal carotid pulses, no hepatojugular reflux and no JVD present. Carotid bruit is not present. No tracheal deviation present. No thyroid mass and no thyromegaly present.   Cardiovascular: Normal rate, regular rhythm, S1 normal, S2 normal,  normal heart sounds and intact distal pulses.  Exam reveals no gallop and no friction rub.    No murmur heard.  Carotid exam normal   Pulmonary/Chest: Effort normal and breath sounds normal. No accessory muscle usage. No respiratory distress. She has no wheezes. She has no rales. She exhibits no tenderness.   Abdominal: Soft. Normal appearance. She exhibits no distension and no mass. There is no splenomegaly or hepatomegaly. There is no tenderness. There is no rebound and no guarding.   Musculoskeletal: Normal range of motion. She exhibits no edema or tenderness.        Right hand: Normal.        Left hand: Normal.       Lymphadenopathy:     She has no cervical adenopathy.     She has no axillary adenopathy.        Right: No inguinal and no supraclavicular adenopathy present.        Left: No inguinal and no supraclavicular adenopathy present.   Neurological: She is alert and oriented to person, place, and time. She has normal strength. No cranial nerve deficit. Coordination normal.   Skin: Skin is warm and dry. No rash noted. She is not diaphoretic. No cyanosis or erythema. No pallor. Nails show no clubbing.   Psychiatric: She has a normal mood and affect. Her behavior is normal. Judgment and thought content normal.       Wt Readings from Last 3 Encounters:   06/19/17 (!) 138.8 kg (306 lb)   06/15/17 (!) 141.2 kg (311 lb 4.6 oz)   06/12/17 (!) 138.5 kg (305 lb 5.4 oz)     Temp Readings from Last 3 Encounters:   06/19/17 98 °F (36.7 °C) (Oral)   06/15/17 96.7 °F (35.9 °C) (Tympanic)   06/12/17 97 °F (36.1 °C)     BP Readings from Last 3 Encounters:   06/19/17 (!) 142/98   06/15/17 (!) 148/98   06/12/17 (!) 142/82     Pulse Readings from Last 3 Encounters:   06/19/17 92   06/15/17 110   06/12/17 99         Assessment:       1. Chronic deep vein thrombosis (DVT) of distal vein of left lower extremity        Plan:       Lab Results   Component Value Date    INR 1.3 (H) 07/13/2017    INR 1.1 06/19/2017    INR 2.4  06/12/2017       She will eb asked to restart Lovenox 140 mg sq bid. New prescription sent to Pharmacy. Continue Coumadin.see me in 5 days with a stat INR

## 2017-07-17 RX ORDER — GLIPIZIDE 5 MG/1
TABLET ORAL
Qty: 90 TABLET | Refills: 2 | Status: SHIPPED | OUTPATIENT
Start: 2017-07-17 | End: 2017-08-16 | Stop reason: SDUPTHER

## 2017-07-18 ENCOUNTER — LAB VISIT (OUTPATIENT)
Dept: LAB | Facility: HOSPITAL | Age: 47
End: 2017-07-18
Attending: INTERNAL MEDICINE
Payer: COMMERCIAL

## 2017-07-18 ENCOUNTER — OFFICE VISIT (OUTPATIENT)
Dept: HEMATOLOGY/ONCOLOGY | Facility: CLINIC | Age: 47
End: 2017-07-18
Payer: COMMERCIAL

## 2017-07-18 VITALS
HEART RATE: 94 BPM | SYSTOLIC BLOOD PRESSURE: 90 MMHG | HEIGHT: 65 IN | RESPIRATION RATE: 12 BRPM | OXYGEN SATURATION: 94 % | DIASTOLIC BLOOD PRESSURE: 70 MMHG | TEMPERATURE: 98 F

## 2017-07-18 DIAGNOSIS — I82.5Z2 CHRONIC DEEP VEIN THROMBOSIS (DVT) OF DISTAL VEIN OF LEFT LOWER EXTREMITY: ICD-10-CM

## 2017-07-18 DIAGNOSIS — Z86.711 HISTORY OF PULMONARY EMBOLISM: Primary | ICD-10-CM

## 2017-07-18 LAB
INR PPP: 3.4
PROTHROMBIN TIME: 35.3 SEC

## 2017-07-18 PROCEDURE — 85610 PROTHROMBIN TIME: CPT | Mod: PO

## 2017-07-18 PROCEDURE — 99999 PR PBB SHADOW E&M-EST. PATIENT-LVL IV: CPT | Mod: PBBFAC,,, | Performed by: INTERNAL MEDICINE

## 2017-07-18 PROCEDURE — 99214 OFFICE O/P EST MOD 30 MIN: CPT | Mod: S$GLB,,, | Performed by: INTERNAL MEDICINE

## 2017-07-18 PROCEDURE — 36415 COLL VENOUS BLD VENIPUNCTURE: CPT | Mod: PO

## 2017-07-18 NOTE — PROGRESS NOTES
Subjective:       Patient ID: Clarissa Turner is a 47 y.o. female.    Chief Complaint: Follow-up    HPI This is a 46-year-old  lady who comes for follow up of her anticoagulation.  She is   known to us because of a history of recurrent DVTs and pulmonary   embolism. The patient has had several episodes of DVT in the past. In 2004,   she had a DVT in the left leg after she had left hip replacement. She was not   given anticoagulation. She had another one after another surgery a few months   later and she was placed on Coumadin for three years. The patient also says   that she had a pulmonary embolism in 2012. She was advised to be on Coumadin   for the rest of her life. She was placed on Coumadin in 2012, has been followed  by the Coumadin Clinic at our institution. She was in the therapeutic range   with 20 mg of Coumadin a day.  Because of the patient's work, it was difficult for her to come to have her INR   checked in the Coumadin Clinic and eventually, her Coumadin was changed to   Xarelto, which she took until recently.  She became concerned about about potential problems  with Xarelto and asked to go back to Coumadin.     She underwent a left knee surgery in mid 2016 . She was bridged with Lovenox.  She developed a left knee hemarthrosis and her anticoagulation was held for a few days with the development of a left popliteal DVT.  She was admitted, and anticoagulation restarted.       She developed a hematoma following a hernia repar in December 2016. The wound beame infected and she had a prolonged stay in the Hospital from 12/2016 to 2/107.  She comes for follow up of her anticoagulation.   Sheh ad a right hip surgery June 20. Then she went to Rehab,.  She was discharged on coumadin oncly and she has been on it for about a week on 20 mg a day. She usually does well with 20 mg a day except Tuesday and Thursday when she takes 10 ,mg   ALLERGIES: The patient is allergic to ACE  inhibitors, that makes her cough.  MEDICATIONS: See MedCard.  PREVIOUS SURGERIES: Bilateral hip replacement. Revision of the left hip in   August 2014. A gallbladder removal.  SOCIAL HISTORY: She is  with one daughter. She lives in [place].   She does not smoke or drink. 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 Maternal grandmother, mother, father and paternal   grandmother had diabetes. Mother and grandmother had congestive heart failure.  PAST MEDICAL HISTORY:  1. Status post bilateral hip replacement.  2. History of recurrent DVT/PE.  3. Reflux.  4. High blood pressure.  5. Diabetes mellitus.  6. Arthritis of knees and hips.  7. Obesity.  Review of Systems   Constitutional: Negative.    HENT: Negative.    Eyes: Negative.    Respiratory: Negative.  Negative for cough and wheezing.    Cardiovascular: Negative.  Negative for chest pain.   Gastrointestinal: Negative.    Genitourinary: Negative.    Neurological: Negative.    Psychiatric/Behavioral: Negative.        Wt Readings from Last 3 Encounters:   06/19/17 (!) 138.8 kg (306 lb)   06/15/17 (!) 141.2 kg (311 lb 4.6 oz)   06/12/17 (!) 138.5 kg (305 lb 5.4 oz)     Temp Readings from Last 3 Encounters:   07/18/17 98.1 °F (36.7 °C) (Oral)   06/19/17 98 °F (36.7 °C) (Oral)   06/15/17 96.7 °F (35.9 °C) (Tympanic)     BP Readings from Last 3 Encounters:   07/18/17 90/70   06/19/17 (!) 142/98   06/15/17 (!) 148/98     Pulse Readings from Last 3 Encounters:   07/18/17 94   06/19/17 92   06/15/17 110       Objective:      Physical Exam   Constitutional: She is oriented to person, place, and time. She appears well-developed. No distress.   overweight   HENT:   Head: Normocephalic.   Right Ear: Tympanic membrane and ear canal normal.   Left Ear: Tympanic membrane and ear canal normal.   Nose: Right sinus exhibits no maxillary sinus tenderness and no frontal sinus  tenderness. Left sinus exhibits no maxillary sinus tenderness and no frontal sinus tenderness.   Mouth/Throat: Mucous membranes are normal.   Teeth normal.  Gums normal.   Eyes: Conjunctivae and lids are normal. Pupils are equal, round, and reactive to light.   Neck: Normal carotid pulses, no hepatojugular reflux and no JVD present. Carotid bruit is not present. No tracheal deviation present. No thyroid mass and no thyromegaly present.   Cardiovascular: Normal rate, S1 normal and S2 normal.  Exam reveals no gallop and no friction rub.    No murmur heard.  Carotid exam normal  1-2.6 alf at the base   Pulmonary/Chest: Effort normal and breath sounds normal. No accessory muscle usage. No respiratory distress. She has no wheezes. She has no rales. She exhibits no tenderness.   Abdominal: Soft. Normal appearance. She exhibits no distension and no mass. There is no splenomegaly or hepatomegaly. There is no tenderness. There is no rebound and no guarding.   Musculoskeletal: Normal range of motion. She exhibits edema. She exhibits no tenderness.        Right hand: Normal.        Left hand: Normal.       Lymphadenopathy:     She has no cervical adenopathy.     She has no axillary adenopathy.        Right: No inguinal and no supraclavicular adenopathy present.        Left: No inguinal and no supraclavicular adenopathy present.   Neurological: She is alert and oriented to person, place, and time. She has normal strength. No cranial nerve deficit. Coordination normal.   Skin: Skin is warm and dry. No rash noted. She is not diaphoretic. No cyanosis or erythema. No pallor. Nails show no clubbing.   Psychiatric: She has a normal mood and affect. Her behavior is normal. Judgment and thought content normal.       Wt Readings from Last 3 Encounters:   06/19/17 (!) 138.8 kg (306 lb)   06/15/17 (!) 141.2 kg (311 lb 4.6 oz)   06/12/17 (!) 138.5 kg (305 lb 5.4 oz)     Temp Readings from Last 3 Encounters:   07/18/17 98.1 °F (36.7 °C)  (Oral)   06/19/17 98 °F (36.7 °C) (Oral)   06/15/17 96.7 °F (35.9 °C) (Tympanic)     BP Readings from Last 3 Encounters:   07/18/17 90/70   06/19/17 (!) 142/98   06/15/17 (!) 148/98     Pulse Readings from Last 3 Encounters:   07/18/17 94   06/19/17 92   06/15/17 110       Assessment:       1. History of pulmonary embolism        Plan:       Lab Results   Component Value Date    INR 3.4 (H) 07/18/2017    INR 1.3 (H) 07/13/2017    INR 1.1 06/19/2017       Will be asked to drop to 10 mg today, then go back to her usual dose. Follow up with the Coumadin clinic in 7 days.  Stop Lovenox as of today

## 2017-07-20 ENCOUNTER — TELEPHONE (OUTPATIENT)
Dept: HEMATOLOGY/ONCOLOGY | Facility: CLINIC | Age: 47
End: 2017-07-20

## 2017-07-20 NOTE — TELEPHONE ENCOUNTER
----- Message from Domenico Arreguin MD sent at 7/20/2017  2:52 PM CDT -----  Read orders from note 7/18/17.  Do you want home health to continue with pt/inr    Documentation     No, she will follow up with coumadin clinic  DR ARREGUIN

## 2017-07-20 NOTE — TELEPHONE ENCOUNTER
----- Message from Luis Lynch sent at 7/20/2017  1:41 PM CDT -----  Contact: [facility] Corrie Arteaga   States she is calling in pt's PT 31.9 and INR 2.7 and currently on 10 mg tues and thurs (couma) 20  Mg all other days and can be reached at [phone]//thanks/dbw

## 2017-07-25 DIAGNOSIS — Z79.01 LONG TERM (CURRENT) USE OF ANTICOAGULANTS: ICD-10-CM

## 2017-07-26 ENCOUNTER — HOSPITAL ENCOUNTER (OUTPATIENT)
Dept: RADIOLOGY | Facility: HOSPITAL | Age: 47
Discharge: HOME | End: 2017-07-26
Attending: FAMILY MEDICINE
Payer: COMMERCIAL

## 2017-07-26 ENCOUNTER — ANTI-COAG VISIT (OUTPATIENT)
Dept: CARDIOLOGY | Facility: CLINIC | Age: 47
End: 2017-07-26
Payer: COMMERCIAL

## 2017-07-26 ENCOUNTER — OFFICE VISIT (OUTPATIENT)
Dept: INTERNAL MEDICINE | Facility: CLINIC | Age: 47
End: 2017-07-26
Payer: COMMERCIAL

## 2017-07-26 VITALS — HEART RATE: 80 BPM | DIASTOLIC BLOOD PRESSURE: 64 MMHG | SYSTOLIC BLOOD PRESSURE: 116 MMHG | TEMPERATURE: 97 F

## 2017-07-26 DIAGNOSIS — R45.86 MOOD SWINGS: ICD-10-CM

## 2017-07-26 DIAGNOSIS — M25.531 RIGHT WRIST PAIN: ICD-10-CM

## 2017-07-26 DIAGNOSIS — Z79.01 LONG TERM (CURRENT) USE OF ANTICOAGULANTS: Primary | ICD-10-CM

## 2017-07-26 DIAGNOSIS — M25.531 RIGHT WRIST PAIN: Primary | ICD-10-CM

## 2017-07-26 LAB
CTP QC/QA: YES
INR PPP: >8 (ref 2–3)

## 2017-07-26 PROCEDURE — 99211 OFF/OP EST MAY X REQ PHY/QHP: CPT | Mod: 25,S$GLB,,

## 2017-07-26 PROCEDURE — 73110 X-RAY EXAM OF WRIST: CPT | Mod: 26,RT,, | Performed by: RADIOLOGY

## 2017-07-26 PROCEDURE — A9152 SINGLE VITAMIN NOS: HCPCS | Mod: S$GLB,,,

## 2017-07-26 PROCEDURE — 73110 X-RAY EXAM OF WRIST: CPT | Mod: TC,PO,RT

## 2017-07-26 PROCEDURE — 99214 OFFICE O/P EST MOD 30 MIN: CPT | Mod: S$GLB,,, | Performed by: FAMILY MEDICINE

## 2017-07-26 PROCEDURE — 85610 PROTHROMBIN TIME: CPT | Mod: QW,S$GLB,,

## 2017-07-26 PROCEDURE — 99999 PR PBB SHADOW E&M-EST. PATIENT-LVL III: CPT | Mod: PBBFAC,,, | Performed by: FAMILY MEDICINE

## 2017-07-26 RX ORDER — ESCITALOPRAM OXALATE 20 MG/1
20 TABLET ORAL DAILY
COMMUNITY
End: 2017-07-26 | Stop reason: SDUPTHER

## 2017-07-26 RX ORDER — OXYCODONE AND ACETAMINOPHEN 10; 325 MG/1; MG/1
1 TABLET ORAL EVERY 4 HOURS PRN
COMMUNITY
End: 2018-02-09

## 2017-07-26 RX ORDER — DIAZEPAM 5 MG/1
5 TABLET ORAL EVERY 8 HOURS PRN
COMMUNITY
End: 2017-07-26 | Stop reason: SDUPTHER

## 2017-07-26 RX ORDER — TRAMADOL HYDROCHLORIDE 50 MG/1
50 TABLET, COATED ORAL EVERY 4 HOURS PRN
COMMUNITY
Start: 2017-07-11 | End: 2018-02-09

## 2017-07-26 RX ORDER — PHYTONADIONE 5 MG/1
5 TABLET ORAL
Status: DISCONTINUED | OUTPATIENT
Start: 2017-07-26 | End: 2018-11-26 | Stop reason: HOSPADM

## 2017-07-26 RX ORDER — QUETIAPINE FUMARATE 25 MG/1
TABLET, FILM COATED ORAL
Refills: 0 | COMMUNITY
Start: 2017-07-11 | End: 2017-07-26

## 2017-07-26 RX ORDER — ESCITALOPRAM OXALATE 20 MG/1
20 TABLET ORAL DAILY
Qty: 90 TABLET | Refills: 1 | Status: SHIPPED | OUTPATIENT
Start: 2017-07-26 | End: 2018-02-09

## 2017-07-26 RX ORDER — METHOCARBAMOL 750 MG/1
750 TABLET, FILM COATED ORAL 4 TIMES DAILY
COMMUNITY
Start: 2017-07-11 | End: 2018-05-25

## 2017-07-26 RX ORDER — DIAZEPAM 5 MG/1
5 TABLET ORAL NIGHTLY PRN
Qty: 30 TABLET | Refills: 1 | Status: SHIPPED | OUTPATIENT
Start: 2017-07-26 | End: 2018-02-09

## 2017-07-26 RX ORDER — WARFARIN 10 MG/1
TABLET ORAL
Qty: 44 TABLET | Refills: 5 | Status: ON HOLD | OUTPATIENT
Start: 2017-07-26 | End: 2018-11-26 | Stop reason: HOSPADM

## 2017-07-26 RX ORDER — QUETIAPINE FUMARATE 50 MG/1
50 TABLET, FILM COATED ORAL NIGHTLY
Qty: 30 TABLET | Refills: 2 | Status: SHIPPED | OUTPATIENT
Start: 2017-07-26 | End: 2018-02-09

## 2017-07-26 RX ORDER — HYDROCODONE BITARTRATE AND ACETAMINOPHEN 10; 325 MG/1; MG/1
TABLET ORAL EVERY 4 HOURS PRN
COMMUNITY
End: 2018-02-09

## 2017-07-26 NOTE — PROGRESS NOTES
Subjective:       Patient ID: Clarissa Turner is a 47 y.o. female.    Chief Complaint: Hospital Follow Up    Follow-up:      Pt was in hospital post 1 month ago with 2nd revision of hip replacement. She is experiencing some reactive depression due to this surgery. Although no set backs in the therapy of her hip, it has settled in that the rehab will be some time. She also suffers from DM as well as HTN which are controlled at this time. Pt has been on Lexapro and valium 5mg with Seroquel 25 mg. Discussed with pt increasing the seroquel to 50 mg.      Review of Systems   Constitutional: Negative.    Respiratory: Negative.    Cardiovascular: Negative.    Gastrointestinal: Negative.    Musculoskeletal: Negative.         Left hip pain   Neurological: Negative.    Hematological: Negative.    Psychiatric/Behavioral: Positive for dysphoric mood, hallucinations (possilbe medication induced) and sleep disturbance. Negative for self-injury and suicidal ideas. The patient is nervous/anxious.        Objective:      Physical Exam   Constitutional: She is oriented to person, place, and time. She appears well-developed and well-nourished.   Cardiovascular: Normal rate and regular rhythm.  Exam reveals no friction rub.    No murmur heard.  Pulmonary/Chest: Effort normal and breath sounds normal. She has no wheezes. She has no rales.   Abdominal: Soft. Bowel sounds are normal.   Neurological: She is alert and oriented to person, place, and time. She has normal reflexes.   Skin: Skin is warm and dry.   Psychiatric: Her speech is normal and behavior is normal. Judgment normal. Her mood appears anxious. She is not actively hallucinating. She exhibits a depressed mood. She expresses no homicidal ideation. She expresses no suicidal plans and no homicidal plans.       Assessment:       1. Right wrist pain    2. Mood swings        Plan:       Right wrist pain  Comments:  Will do xray of right wrist  Orders:  -     X-Ray Wrist  Complete Right; Future; Expected date: 07/26/2017    Mood swings  Comments:  Will increase the seroquel to 50 mg a day    Other orders  -     quetiapine (SEROQUEL) 50 MG tablet; Take 1 tablet (50 mg total) by mouth every evening.  Dispense: 30 tablet; Refill: 2  -     diazePAM (VALIUM) 5 MG tablet; Take 1 tablet (5 mg total) by mouth nightly as needed for Anxiety.  Dispense: 30 tablet; Refill: 1  -     escitalopram oxalate (LEXAPRO) 20 MG tablet; Take 1 tablet (20 mg total) by mouth once daily.  Dispense: 90 tablet; Refill: 1

## 2017-07-26 NOTE — PROGRESS NOTES
INR today is >8. Revised hip replacement 6/20, held warfarin with lovenox bridge. INR on last week was 2.7. Patient reports a nosebleed x2 days. No other signs/symptoms of bleeding. Oral vitamin k 5mg given in clinic because of the recent nosebleeds. Hold warfarin for now. Repetat INR on Friday.

## 2017-07-28 ENCOUNTER — ANTI-COAG VISIT (OUTPATIENT)
Dept: CARDIOLOGY | Facility: CLINIC | Age: 47
End: 2017-07-28
Payer: COMMERCIAL

## 2017-07-28 DIAGNOSIS — S63.001A: Primary | ICD-10-CM

## 2017-07-28 DIAGNOSIS — Z79.01 LONG TERM (CURRENT) USE OF ANTICOAGULANTS: Primary | ICD-10-CM

## 2017-07-28 LAB — INR PPP: 1.3 (ref 2–3)

## 2017-07-28 PROCEDURE — 85610 PROTHROMBIN TIME: CPT | Mod: QW,S$GLB,,

## 2017-07-28 PROCEDURE — 99211 OFF/OP EST MAY X REQ PHY/QHP: CPT | Mod: 25,S$GLB,,

## 2017-07-28 NOTE — PROGRESS NOTES
INR is now sub-therapeutic after a supra-therapeutic INR on 7/26. Will resume warfarin at lowered dose. Repeat INR in 1 week.

## 2017-07-31 ENCOUNTER — TELEPHONE (OUTPATIENT)
Dept: INTERNAL MEDICINE | Facility: CLINIC | Age: 47
End: 2017-07-31

## 2017-07-31 NOTE — TELEPHONE ENCOUNTER
----- Message from Chandrakant Gabriel sent at 7/31/2017  8:10 AM CDT -----  Contact: pt  She's calling in regards to a missed call, please advise,  [phone] (home)

## 2017-08-01 ENCOUNTER — OFFICE VISIT (OUTPATIENT)
Dept: SLEEP MEDICINE | Facility: CLINIC | Age: 47
End: 2017-08-01
Payer: COMMERCIAL

## 2017-08-01 VITALS
SYSTOLIC BLOOD PRESSURE: 140 MMHG | HEIGHT: 65 IN | HEART RATE: 115 BPM | OXYGEN SATURATION: 97 % | RESPIRATION RATE: 20 BRPM | BODY MASS INDEX: 48.67 KG/M2 | WEIGHT: 292.13 LBS | DIASTOLIC BLOOD PRESSURE: 86 MMHG

## 2017-08-01 DIAGNOSIS — G47.33 OSA (OBSTRUCTIVE SLEEP APNEA): Primary | ICD-10-CM

## 2017-08-01 DIAGNOSIS — E66.01 MORBID OBESITY WITH BMI OF 45.0-49.9, ADULT: ICD-10-CM

## 2017-08-01 PROCEDURE — 99999 PR PBB SHADOW E&M-EST. PATIENT-LVL IV: CPT | Mod: PBBFAC,,, | Performed by: NURSE PRACTITIONER

## 2017-08-01 PROCEDURE — 99213 OFFICE O/P EST LOW 20 MIN: CPT | Mod: S$GLB,,, | Performed by: NURSE PRACTITIONER

## 2017-08-01 NOTE — PROGRESS NOTES
"Subjective:      Patient ID: Clarissa Turner is a 47 y.o. female.    Chief Complaint: Sleep Apnea (inital download compliance)    Patient resists to the clinic for evaluation of sleep apnea.  She has very severe sleep apnea.  She was started on CPAP 8 cm water pressure.  Initially, her mask was falling off in the middle the night.  She started using another mask, and is benefiting greatly and able to wear over 4 hours.  She is sleeping more soundly.        BP (!) 140/86   Pulse (!) 115   Resp 20   Ht 5' 5" (1.651 m)   Wt 132.5 kg (292 lb 1.8 oz)   LMP 04/14/2015 (Exact Date)   SpO2 97%   BMI 48.61 kg/m²   Body mass index is 48.61 kg/m².    Review of Systems   Constitutional: Negative.    HENT: Negative.    Respiratory: Negative.    Cardiovascular: Negative.    Musculoskeletal: Positive for arthralgias.   Psychiatric/Behavioral: Negative.      Objective:      Physical Exam   Constitutional: She is oriented to person, place, and time. She appears well-developed and well-nourished.   Obese   HENT:   Head: Normocephalic and atraumatic.   Mouth/Throat: Oropharynx is clear and moist.   Neck: Normal range of motion. Neck supple.   Cardiovascular: Normal rate and regular rhythm.  Exam reveals no gallop.    No murmur heard.  Pulmonary/Chest: Effort normal and breath sounds normal.   Abdominal: Soft.   Musculoskeletal: Normal range of motion. She exhibits no edema.   Neurological: She is alert and oriented to person, place, and time.   Skin: Skin is warm and dry.   Psychiatric: She has a normal mood and affect.     Personal Diagnostic Review  CPAP download with 60% compliance.  Average use 5 hours and 41 minutes.  AHI 6        Assessment:       1. JAKE (obstructive sleep apnea)    2. Morbid obesity with BMI of 45.0-49.9, adult        Outpatient Encounter Prescriptions as of 8/1/2017   Medication Sig Dispense Refill    atorvastatin (LIPITOR) 80 MG tablet TAKE ONE TABLET BY MOUTH ONCE DAILY 30 tablet 1    blood " sugar diagnostic Strp Use to check blood sugars twice daily. 100 strip 4    blood-glucose meter (ONETOUCH VERIO IQ METER) kit Use as instructed 1 each 0    DOCUSATE CALCIUM (STOOL SOFTENER ORAL) Take 2 tablets by mouth as needed.       escitalopram oxalate (LEXAPRO) 20 MG tablet Take 1 tablet (20 mg total) by mouth once daily. 90 tablet 1    fluticasone (FLONASE) 50 mcg/actuation nasal spray 1 spray by Each Nare route once daily. 1 Bottle 3    gabapentin (NEURONTIN) 300 MG capsule Take 600 mg by mouth every evening.       glipiZIDE (GLUCOTROL) 5 MG tablet TAKE 1 TABLET(5 MG) BY MOUTH DAILY WITH BREAKFAST 90 tablet 2    hydrocodone-acetaminophen 10-325mg (NORCO)  mg Tab Take by mouth every 4 (four) hours as needed for Pain.      insulin glargine (LANTUS SOLOSTAR) 100 unit/mL (3 mL) InPn pen Inject 30 Units into the skin every evening. 2 Box 1    lancets Misc Use to check blood sugars twice daily. 100 each 4    levothyroxine (SYNTHROID) 75 MCG tablet Take 1 tablet (75 mcg total) by mouth once daily. Avoid calcium, iron, food, or fiber within 1 hour of taking medication. 90 tablet 2    lidocaine (LIDODERM) 5 % APPLY 1 PATCH FOR 12 HOURS THEN 12 HOURS OFF PRN P  2    loratadine (CLARITIN) 10 mg tablet Take 1 tablet (10 mg total) by mouth once daily.  0    losartan (COZAAR) 25 MG tablet Take 1 tablet (25 mg total) by mouth once daily. 90 tablet 0    metformin (GLUCOPHAGE-XR) 750 MG 24 hr tablet Take 1 tablet (750 mg total) by mouth 2 (two) times daily with meals. 180 tablet 2    methocarbamol (ROBAXIN) 750 MG Tab 750 mg 4 (four) times daily.       pantoprazole (PROTONIX) 40 MG tablet Take 1 tablet (40 mg total) by mouth once daily. 30 tablet 3    quetiapine (SEROQUEL) 50 MG tablet Take 1 tablet (50 mg total) by mouth every evening. 30 tablet 2    warfarin (COUMADIN) 10 MG tablet TAKE 2 TABLETS BY MOUTH DAILY EXCEPT 1 TABLET ON MONDAY, TUESDAYS, WEDNESDAY AS DIRECTED BY THE COUMADIN CLINIC  "DISCONTINUE 6 MG TABLET 44 tablet 5    diazePAM (VALIUM) 5 MG tablet Take 1 tablet (5 mg total) by mouth nightly as needed for Anxiety. 30 tablet 1    HUMALOG KWIKPEN 100 unit/mL InPn pen Sliding Scale:    150-200- 4 units  201-250= 6 unit  251-300= 8 units  301 greater 10 units and caused 1 Box 1    oxycodone-acetaminophen (PERCOCET)  mg per tablet Take 1 tablet by mouth every 4 (four) hours as needed for Pain.      PEN NEEDLE 31 gauge x 1/4" Ndle USE ONE  4 TIMES DAILY WITH MEALS AND NIGHTLY 150 each 0    ULTRAM 50 mg tablet Take 50 mg by mouth every 4 (four) hours as needed.        Facility-Administered Encounter Medications as of 8/1/2017   Medication Dose Route Frequency Provider Last Rate Last Dose    phytonadione (vitamin K1) tablet 5 mg  5 mg Oral 1 time in Clinic/HOD Luzmaria Quiñones MD         No orders of the defined types were placed in this encounter.    Plan:   Better mask fitting over the last few weeks and benefits from use.  Follow-up before 90 day set up date to review compliance.     "

## 2017-08-04 ENCOUNTER — HOSPITAL ENCOUNTER (OUTPATIENT)
Dept: RADIOLOGY | Facility: HOSPITAL | Age: 47
Discharge: HOME | End: 2017-08-04
Attending: FAMILY MEDICINE
Payer: COMMERCIAL

## 2017-08-04 ENCOUNTER — ANTI-COAG VISIT (OUTPATIENT)
Dept: CARDIOLOGY | Facility: CLINIC | Age: 47
End: 2017-08-04
Payer: COMMERCIAL

## 2017-08-04 VITALS — WEIGHT: 292 LBS | HEIGHT: 65 IN | BODY MASS INDEX: 48.65 KG/M2

## 2017-08-04 DIAGNOSIS — Z79.01 LONG TERM (CURRENT) USE OF ANTICOAGULANTS: Primary | ICD-10-CM

## 2017-08-04 DIAGNOSIS — Z12.31 OTHER SCREENING MAMMOGRAM: ICD-10-CM

## 2017-08-04 LAB — INR PPP: 2.1 (ref 2–3)

## 2017-08-04 PROCEDURE — 85610 PROTHROMBIN TIME: CPT | Mod: QW,S$GLB,,

## 2017-08-04 PROCEDURE — 77067 SCR MAMMO BI INCL CAD: CPT | Mod: TC

## 2017-08-04 PROCEDURE — 77067 SCR MAMMO BI INCL CAD: CPT | Mod: 26,,, | Performed by: RADIOLOGY

## 2017-08-04 PROCEDURE — 99211 OFF/OP EST MAY X REQ PHY/QHP: CPT | Mod: 25,S$GLB,,

## 2017-08-04 NOTE — PROGRESS NOTES
INR is now therapeutic. This is a quick follow-up after several erratic INRs on last week. Will continue with lowered dose until follow-up.

## 2017-08-10 ENCOUNTER — LAB VISIT (OUTPATIENT)
Dept: LAB | Facility: HOSPITAL | Age: 47
End: 2017-08-10
Attending: PHYSICIAN ASSISTANT
Payer: COMMERCIAL

## 2017-08-10 ENCOUNTER — OFFICE VISIT (OUTPATIENT)
Dept: DIABETES | Facility: CLINIC | Age: 47
End: 2017-08-10
Payer: COMMERCIAL

## 2017-08-10 VITALS — SYSTOLIC BLOOD PRESSURE: 120 MMHG | HEIGHT: 65 IN | DIASTOLIC BLOOD PRESSURE: 92 MMHG

## 2017-08-10 LAB — GLUCOSE SERPL-MCNC: 82 MG/DL (ref 70–110)

## 2017-08-10 PROCEDURE — 3045F PR MOST RECENT HEMOGLOBIN A1C LEVEL 7.0-9.0%: CPT | Mod: S$GLB,,, | Performed by: PHYSICIAN ASSISTANT

## 2017-08-10 PROCEDURE — 3080F DIAST BP >= 90 MM HG: CPT | Mod: S$GLB,,, | Performed by: PHYSICIAN ASSISTANT

## 2017-08-10 PROCEDURE — 99215 OFFICE O/P EST HI 40 MIN: CPT | Mod: S$GLB,,, | Performed by: PHYSICIAN ASSISTANT

## 2017-08-10 PROCEDURE — 82948 REAGENT STRIP/BLOOD GLUCOSE: CPT | Mod: S$GLB,,, | Performed by: PHYSICIAN ASSISTANT

## 2017-08-10 PROCEDURE — 36415 COLL VENOUS BLD VENIPUNCTURE: CPT | Mod: PO

## 2017-08-10 PROCEDURE — 83036 HEMOGLOBIN GLYCOSYLATED A1C: CPT

## 2017-08-10 PROCEDURE — 99999 PR PBB SHADOW E&M-EST. PATIENT-LVL III: CPT | Mod: PBBFAC,,, | Performed by: PHYSICIAN ASSISTANT

## 2017-08-10 PROCEDURE — 3008F BODY MASS INDEX DOCD: CPT | Mod: S$GLB,,, | Performed by: PHYSICIAN ASSISTANT

## 2017-08-10 PROCEDURE — 4010F ACE/ARB THERAPY RXD/TAKEN: CPT | Mod: S$GLB,,, | Performed by: PHYSICIAN ASSISTANT

## 2017-08-10 PROCEDURE — 3074F SYST BP LT 130 MM HG: CPT | Mod: S$GLB,,, | Performed by: PHYSICIAN ASSISTANT
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
## 2017-08-11 LAB
ESTIMATED AVG GLUCOSE: 120 MG/DL
HBA1C MFR BLD HPLC: 5.8 %

## 2017-08-13 RX ORDER — INSULIN LISPRO 100 [IU]/ML
INJECTION, SOLUTION INTRAVENOUS; SUBCUTANEOUS
Qty: 1 BOX | Refills: 1 | OUTPATIENT
Start: 2017-08-13 | End: 2017-08-16 | Stop reason: SDUPTHER

## 2017-08-16 DIAGNOSIS — E11.69 DIABETES MELLITUS TYPE 2 IN OBESE: ICD-10-CM

## 2017-08-16 DIAGNOSIS — E66.9 DIABETES MELLITUS TYPE 2 IN OBESE: ICD-10-CM

## 2017-08-16 RX ORDER — GLIPIZIDE 5 MG/1
5 TABLET ORAL
Qty: 90 TABLET | Refills: 2 | Status: SHIPPED | OUTPATIENT
Start: 2017-08-16 | End: 2018-11-20

## 2017-08-16 RX ORDER — INSULIN LISPRO 100 [IU]/ML
INJECTION, SOLUTION INTRAVENOUS; SUBCUTANEOUS
Qty: 1 BOX | Refills: 1 | Status: SHIPPED | OUTPATIENT
Start: 2017-08-16 | End: 2018-08-20

## 2017-08-17 ENCOUNTER — TELEPHONE (OUTPATIENT)
Dept: INTERNAL MEDICINE | Facility: CLINIC | Age: 47
End: 2017-08-17

## 2017-08-17 NOTE — TELEPHONE ENCOUNTER
----- Message from Kayley Medina sent at 8/17/2017 12:00 PM CDT -----  Contact: [facility]   Caller states that she need clarification on a med that was sent over.    ...[phone]

## 2017-09-13 ENCOUNTER — ANTI-COAG VISIT (OUTPATIENT)
Dept: CARDIOLOGY | Facility: CLINIC | Age: 47
End: 2017-09-13
Payer: COMMERCIAL

## 2017-09-13 DIAGNOSIS — Z79.01 LONG TERM (CURRENT) USE OF ANTICOAGULANTS: Primary | ICD-10-CM

## 2017-09-13 LAB — INR PPP: 1.9 (ref 2–3)

## 2017-09-13 PROCEDURE — 99211 OFF/OP EST MAY X REQ PHY/QHP: CPT | Mod: 25,S$GLB,,

## 2017-09-13 PROCEDURE — 85610 PROTHROMBIN TIME: CPT | Mod: QW,S$GLB,,

## 2017-09-13 RX ORDER — PEN NEEDLE, DIABETIC 29 G X1/2"
NEEDLE, DISPOSABLE MISCELLANEOUS
Qty: 150 EACH | Refills: 0 | Status: SHIPPED | OUTPATIENT
Start: 2017-09-13

## 2017-09-13 NOTE — PROGRESS NOTES
INR today is 1.9. Will boost tonight's dose only then re-challenge dose and diet until follow-up. Patient reports no bleeding or bruising, no new medications and no diet changes.  I reminded the patient to call with any problems, changes or questions before the next visit.

## 2017-09-18 PROBLEM — Z00.00 ANNUAL PHYSICAL EXAM: Status: RESOLVED | Noted: 2017-06-15 | Resolved: 2017-09-18

## 2017-12-08 RX ORDER — LOSARTAN POTASSIUM 25 MG/1
TABLET ORAL
Qty: 90 TABLET | Refills: 0 | Status: SHIPPED | OUTPATIENT
Start: 2017-12-08 | End: 2018-08-20

## 2018-01-09 ENCOUNTER — ANTI-COAG VISIT (OUTPATIENT)
Dept: CARDIOLOGY | Facility: CLINIC | Age: 48
End: 2018-01-09

## 2018-02-08 DIAGNOSIS — M25.561 PAIN IN BOTH KNEES, UNSPECIFIED CHRONICITY: Primary | ICD-10-CM

## 2018-02-08 DIAGNOSIS — M25.562 PAIN IN BOTH KNEES, UNSPECIFIED CHRONICITY: Primary | ICD-10-CM

## 2018-02-08 DIAGNOSIS — M25.552 PAIN OF BOTH HIP JOINTS: ICD-10-CM

## 2018-02-08 DIAGNOSIS — M25.551 PAIN OF BOTH HIP JOINTS: ICD-10-CM

## 2018-02-09 ENCOUNTER — HOSPITAL ENCOUNTER (OUTPATIENT)
Dept: RADIOLOGY | Facility: HOSPITAL | Age: 48
Discharge: HOME | End: 2018-02-09
Attending: PHYSICIAN ASSISTANT
Payer: MEDICAID

## 2018-02-09 ENCOUNTER — OFFICE VISIT (OUTPATIENT)
Dept: ORTHOPEDICS | Facility: CLINIC | Age: 48
End: 2018-02-09
Payer: MEDICAID

## 2018-02-09 VITALS
BODY MASS INDEX: 48.67 KG/M2 | WEIGHT: 292.13 LBS | SYSTOLIC BLOOD PRESSURE: 128 MMHG | HEART RATE: 115 BPM | DIASTOLIC BLOOD PRESSURE: 86 MMHG | HEIGHT: 65 IN

## 2018-02-09 DIAGNOSIS — M25.562 PAIN IN BOTH KNEES, UNSPECIFIED CHRONICITY: ICD-10-CM

## 2018-02-09 DIAGNOSIS — M17.11 ARTHRITIS OF RIGHT KNEE: Primary | ICD-10-CM

## 2018-02-09 DIAGNOSIS — M25.561 PAIN IN BOTH KNEES, UNSPECIFIED CHRONICITY: ICD-10-CM

## 2018-02-09 PROCEDURE — 99214 OFFICE O/P EST MOD 30 MIN: CPT | Mod: PBBFAC,25,PO | Performed by: PHYSICIAN ASSISTANT

## 2018-02-09 PROCEDURE — 99999 PR PBB SHADOW E&M-EST. PATIENT-LVL IV: CPT | Mod: PBBFAC,,, | Performed by: PHYSICIAN ASSISTANT

## 2018-02-09 PROCEDURE — 73564 X-RAY EXAM KNEE 4 OR MORE: CPT | Mod: 26,LT,, | Performed by: RADIOLOGY

## 2018-02-09 PROCEDURE — 73564 X-RAY EXAM KNEE 4 OR MORE: CPT | Mod: 26,59,RT, | Performed by: RADIOLOGY

## 2018-02-09 PROCEDURE — 73564 X-RAY EXAM KNEE 4 OR MORE: CPT | Mod: TC,50,FY,PO

## 2018-02-09 PROCEDURE — 3008F BODY MASS INDEX DOCD: CPT | Mod: ,,, | Performed by: PHYSICIAN ASSISTANT

## 2018-02-09 PROCEDURE — 99213 OFFICE O/P EST LOW 20 MIN: CPT | Mod: S$PBB,,, | Performed by: PHYSICIAN ASSISTANT

## 2018-02-09 RX ORDER — INSULIN ASPART 100 [IU]/ML
INJECTION, SOLUTION INTRAVENOUS; SUBCUTANEOUS
COMMUNITY
Start: 2017-10-24 | End: 2018-11-20

## 2018-02-09 RX ORDER — MIRTAZAPINE 30 MG/1
30 TABLET, FILM COATED ORAL NIGHTLY
COMMUNITY
Start: 2017-11-16

## 2018-02-09 NOTE — PATIENT INSTRUCTIONS
Osteoarthritis  Osteoarthritis (also called degenerative joint disease) happens when the cartilage in a joint becomes damaged and worn. This may be due to age, wear and tear, overuse of the joint, or other problems. Osteoarthritis can affect any joint. But it is most common in hands, knees, spine, hips, and feet. Symptoms include joint stiffness, pain, and swelling.  Home care  · When a joint is more sore than usual, rest it for a day or two.  · Heat can help relieve stiffness. Take a hot bath or apply a heating pad for up to 30 minutes at a time. If symptoms are worse in the morning, using heat just after awakening can help relax the muscle and soothe the joints.   · Ice helps relieve pain and swelling. It is often used after activity. Use a cold pack wrapped in a thin cloth on the joint for 10 to 15 minutes at a time.   · Alternating hot and cold can also help relieve pain. Try this for 20 minutes at a time, several times per day.  · Exercise helps prevent the muscles and ligaments around the joint from becoming weak. It also helps maintain function in the joint.  Be as active as you can. Talk to your healthcare provider about what activity program is best for you.  · Excess weight puts a lot of extra strain on weight-bearing joints of the lower back, hips, knees, feet and ankles. If you are overweight, talk to your healthcare provider about a safe and effective weight loss program.  · Use anti-inflammatory medicines as prescribed for pain. This includes acetaminophen or NSAIDs such as ibuprofen or naproxen. If needed, topical or injected medicines may be recommended. Talk to your healthcare provider if these options are not enough to manage your pain.  · Talk with your healthcare provider about devices that might help improve your function and reduce pain.  Follow-up care  Follow up with your healthcare provider as advised by our staff.  When to seek medical advice  Call your healthcare provider right away if  any of these occur:  · Redness or swelling of a painful joint  · Discharge or pus from a painful joint  · Fever of 100.4ºF (38ºC) or higher, or as directed by your healthcare provider  · Worsening joint pain  · Decreased ability to move the joint or bear weight on the joint  Date Last Reviewed: 3/1/2017  © 0633-0889 [employer]. [address] [place] [address]. All rights reserved. This information is not intended as a substitute for professional medical care. Always follow your healthcare professional's instructions.        Hylan G-F 20 intra-articular injection  What is this medicine?  HYLAN G-F 20 (HI ron G F 20) is used to treat osteoarthritis of the knee. It lubricates and cushions the joint, reducing pain in the knee.  How should I use this medicine?  This medicine is for injection into the knee joint. It is given by a health care professional in a hospital or clinic setting.  Talk to your pediatrician regarding the use of this medicine in children. This medicine is not approved for use in children.  What side effects may I notice from receiving this medicine?  Side effects that you should report to your doctor or health care professional as soon as possible:  · allergic reactions like skin rash, itching or hives, swelling of the face, lips, or tongue  · difficulty breathing  · fever or chills  · severe joint pain or swelling  · unusual bleeding or bruising  Side effects that usually do not require medical attention (Report these to your doctor or health care professional if they continue or are bothersome.):  · dizziness  · flushing  · general ill feeling or flu-like symptoms  · headache  · minor joint pain or swelling  · muscle pain or cramps  · pain, redness, irritation or bruising at site of injection  What may interact with this medicine?  Do not take this medicine with any of the following medications:  · other injections for the joint like steroids or anesthetics  · certain skin  disinfectants like benzalkonium chloride  What if I miss a dose?  Keep appointments for follow-up doses as directed. For Synvisc, you will need weekly injections for 3 doses. It is important not to miss your dose. If you will receive Synvisc-One, then only 1 injection will be needed. Call your doctor or health care professional if you are unable to keep an appointment.  Where should I keep my medicine?  This drug is given in a hospital or clinic and will not be stored at home.  What should I tell my health care provider before I take this medicine?  They need to know if you have any of these conditions:  · severe knee inflammation  · skin conditions or sensitivity  · skin or joint infection  · venous stasis  · an unusual or allergic reaction to hylan G-F 20, hyaluronan (sodium hyaluronate), eggs, other medicines, foods, dyes, or preservatives  · pregnant or trying to get pregnant  · breast-feeding  What should I watch for while using this medicine?  Tell your doctor or healthcare professional if your symptoms do not start to get better or if they get worse. Your condition will be monitored carefully while you are receiving this medicine. Most persons get pain relief for up to 6 months after treatment.  Avoid strenuous activities (high-impact sports, jogging) or major weight-bearing activities for 48 hours after the injection.  NOTE:This sheet is a summary. It may not cover all possible information. If you have questions about this medicine, talk to your doctor, pharmacist, or health care provider. Copyright© 2017 [employer]
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
## 2018-03-05 ENCOUNTER — TELEPHONE (OUTPATIENT)
Dept: ORTHOPEDICS | Facility: CLINIC | Age: 48
End: 2018-03-05

## 2018-03-05 NOTE — TELEPHONE ENCOUNTER
Spoke with the patient about her Synvisc injection because it was not approve yet. I told the patient that she was waiting on W/C to get approved for her injection. The patient told me that her right knee is not the one that is under W/C it is her left knee that is under W/C. I transferred her to W/C so that she can speck with someone. We are waiting on the approval so that she can get the injection in the Right Knee. TEDDY

## 2018-03-09 DIAGNOSIS — E11.9 TYPE 2 DIABETES MELLITUS WITHOUT COMPLICATION: ICD-10-CM

## 2018-03-14 ENCOUNTER — PATIENT MESSAGE (OUTPATIENT)
Dept: ORTHOPEDICS | Facility: CLINIC | Age: 48
End: 2018-03-14

## 2018-03-20 ENCOUNTER — TELEPHONE (OUTPATIENT)
Dept: ORTHOPEDICS | Facility: CLINIC | Age: 48
End: 2018-03-20

## 2018-03-20 DIAGNOSIS — M25.569 KNEE PAIN, UNSPECIFIED CHRONICITY, UNSPECIFIED LATERALITY: Primary | ICD-10-CM

## 2018-03-20 NOTE — TELEPHONE ENCOUNTER
Spoke with the patient and I let her know that we had to call workers comp to see which knee is workers comp.I told the patient that I would give her a call on tomorrow after I talk to her workers comp. FP

## 2018-03-26 ENCOUNTER — OFFICE VISIT (OUTPATIENT)
Dept: PAIN MEDICINE | Facility: CLINIC | Age: 48
End: 2018-03-26
Payer: MEDICAID

## 2018-03-26 ENCOUNTER — HOSPITAL ENCOUNTER (OUTPATIENT)
Dept: RADIOLOGY | Facility: HOSPITAL | Age: 48
Discharge: HOME | End: 2018-03-26
Attending: ANESTHESIOLOGY
Payer: MEDICAID

## 2018-03-26 VITALS
WEIGHT: 292 LBS | HEART RATE: 88 BPM | DIASTOLIC BLOOD PRESSURE: 85 MMHG | BODY MASS INDEX: 48.65 KG/M2 | RESPIRATION RATE: 18 BRPM | SYSTOLIC BLOOD PRESSURE: 124 MMHG | HEIGHT: 65 IN

## 2018-03-26 DIAGNOSIS — M53.3 SACROILIAC JOINT DYSFUNCTION: ICD-10-CM

## 2018-03-26 DIAGNOSIS — M70.62 TROCHANTERIC BURSITIS OF LEFT HIP: ICD-10-CM

## 2018-03-26 DIAGNOSIS — M43.06 SPONDYLOLYSIS OF LUMBAR REGION: ICD-10-CM

## 2018-03-26 DIAGNOSIS — M43.06 SPONDYLOLYSIS OF LUMBAR REGION: Primary | ICD-10-CM

## 2018-03-26 DIAGNOSIS — M47.816 LUMBAR SPONDYLOSIS: ICD-10-CM

## 2018-03-26 PROCEDURE — 72114 X-RAY EXAM L-S SPINE BENDING: CPT | Mod: TC,FY,PO

## 2018-03-26 PROCEDURE — 99204 OFFICE O/P NEW MOD 45 MIN: CPT | Mod: S$PBB,,, | Performed by: ANESTHESIOLOGY

## 2018-03-26 PROCEDURE — 72114 X-RAY EXAM L-S SPINE BENDING: CPT | Mod: 26,,, | Performed by: RADIOLOGY

## 2018-03-26 PROCEDURE — 99999 PR PBB SHADOW E&M-EST. PATIENT-LVL V: CPT | Mod: PBBFAC,,, | Performed by: ANESTHESIOLOGY

## 2018-03-26 PROCEDURE — 99215 OFFICE O/P EST HI 40 MIN: CPT | Mod: PBBFAC,25,PO | Performed by: ANESTHESIOLOGY

## 2018-03-26 RX ORDER — OXYCODONE AND ACETAMINOPHEN 10; 325 MG/1; MG/1
TABLET ORAL
Qty: 45 TABLET | Refills: 0 | Status: SHIPPED | OUTPATIENT
Start: 2018-03-26 | End: 2018-11-20

## 2018-03-26 NOTE — PROGRESS NOTES
Chief Pain Complaint:  Low-back Pain and Hip Pain (bilateral)        History of Present Illness:   Clarissa Turner is a 48 y.o. female  who is presenting with a chief complaint of Low-back Pain and Hip Pain (bilateral)  . The patient began experiencing this problem insidiously, and the pain has been gradually worsening over the past 8 month(s). The pain is described as throbbing, cramping, aching and heavy and is located in the left buttock and left lateral thigh. Pain is intermittent and lasts hours. The  pain is nonradiating. The patient rates her pain a 8 out of ten and interferes with activities of daily living a 8 out of ten. Pain is exacerbated by getting up from a seated position, and is improved by rest, Percocet. Patient reports no prior trauma, no prior spinal surgery     - pertinent negatives: No fever, No chills, No weight loss, No bladder dysfunction, No bowel dysfunction, No saddle anesthesia  - pertinent positives: generalized nonspecific Lower Extremity weakness bilaterally    - medications, other therapies tried (physical therapy, injections):     >> NSAIDs, Tylenol, Tramadol, Norco, Percocet, gabapentin, zanaflex and robaxin    >> Has previously undergone Physical Therapy    >> Has NOT previously undergone spinal injection/s      Imaging / Labs / Studies (reviewed on 3/26/2018):        Review of Systems:  CONSTITUTIONAL: patient denies any fever, chills, or weight loss  SKIN: patient denies any rash or itching  RESPIRATORY: patient denies having any shortness of breath  GASTROINTESTINAL: patient denies having any diarrhea, constipation, or bowel incontinence  GENITOURINARY: patient denies having any abnormal bladder function    MUSCULOSKELETAL:  - patient complains of the above noted pain/s (see chief pain complaint)    NEUROLOGICAL:   - pain as above  - strength in Lower extremities is intact, BILATERALLY  - sensation in Lower extremities is intact, BILATERALLY  - patient denies any loss  "of bowel or bladder control      PSYCHIATRIC: patient denies any change in mood    Other:  All other systems reviewed and are negative      Physical Exam:  /85 (BP Location: Right arm, Patient Position: Sitting, BP Method: Medium (Automatic))   Pulse 88   Resp 18   Ht 5' 5" (1.651 m)   Wt 132.5 kg (292 lb)   LMP 04/14/2015 (Exact Date)   BMI 48.59 kg/m²  (reviewed on 3/26/2018)  General: Alert and oriented, in no apparent distress.  Gait: normal gait.  Skin: No rashes, No discoloration, No obvious lesions  HEENT: Normocephalic, atraumatic. Pupils equal and round.  Cardiovascular: Regular rate and rhythm , no significant peripheral edema present  Respiratory: Without audible wheezing, without use of accessory muscles of respiration.    Musculoskeletal:    Lumbar Spine    - Pain on flexion of lumbar spine Absent  - Straight Leg Raise:  Absent    - Pain on extension of lumbar spine Absent  - TTP over the lumbar facet joints Absent  - Lumbar facet loading Absent    -Pain on palpation over the SI joint  Present on Left  - HEIKE: Present on Left  -TTP over the left Trochanteric Bursa.       Neuro:    Strength:  LE R/L: HF: 5/5, HE: 5/5, KF: 5/5; KE: 5/5; FE: 5/5; FF: 5/5    Extremity Reflexes: Brisk and symmetric throughout.      Extremity Sensory: Sensation to pinprick and temperature symmetric. Proprioception intact.      Psych:  Mood and affect is appropriate      Assessment:    Clarissa Turner is a 48 y.o. year old female who is presenting with     Encounter Diagnoses   Name Primary?    Lumbar spondylosis     Spondylolysis of lumbar region Yes    Trochanteric bursitis of left hip     Sacroiliac joint dysfunction        Plan:    1. Interventional: Left SI joint injection and left Trochanteric Bursa Injection with local, patient can continue Coumadin.      2. Pharmacologic: Percocet 10/325 mg PO Q 24 hrs (45 tabs) as a bridge to injection. Continue Gabapentin 800 mg po TID. No NSAID's secondary " to coumadin use. Tylenol Prn.  Patient has been on Butrans patch and Percocet 10/325 mg PO TID PRN for >6 months. Last prescribed in October, 2017. Discouraged the use of chronic opioids.     3. Rehabilitative: PT post injection.    4. Diagnostic: Lumbar X ray.     5. Follow up: Post injection.     20 minutes were spent in this encounter with more than 50% of the time used for counseling and review of the plan.  Imaging / studies reviewed, detailed above.  I discussed in detail the risks, benefits, and alternatives to any and all potential treatment options.  All questions and concerns were fully addressed today in clinic. Medical decision making moderate.    Thank you for the opportunity to assist in the care of this patient.    Best wishes,    Signed:    Rayray Cruz MD          Disclaimer:  This note may have been prepared using voice recognition software, it may have not been extensively proofed, as such there could be errors within the text such as sound alike errors.

## 2018-03-26 NOTE — LETTER
March 26, 2018      Georges Alonso PA-C  [address] [place] [address]           [facility]  [address] [place] [address]  Phone: [phone]  Fax: [phone]          Patient: Clarissa Turner   MR Number: [id number]   YOB: 1970   Date of Visit: 3/26/2018       Dear Georges Alonso:    Thank you for referring Clarissa Turner to me for evaluation. Attached you will find relevant portions of my assessment and plan of care.    If you have questions, please do not hesitate to call me. I look forward to following Clarissa Turner along with you.    Sincerely,    Rayray Cruz MD    Enclosure  CC:  No Recipients    If you would like to receive this communication electronically, please contact [email or web address] or [phone] to request more information on [employer] access.    For providers and/or their staff who would like to refer a patient to [facility], please contact us through our one-stop-shop provider referral line, Serjio Sheldon, at [phone].    If you feel you have received this communication in error or would no longer like to receive these types of communications, please e-mail [email or web address]

## 2018-03-28 ENCOUNTER — TELEPHONE (OUTPATIENT)
Dept: PAIN MEDICINE | Facility: CLINIC | Age: 48
End: 2018-03-28

## 2018-03-28 NOTE — TELEPHONE ENCOUNTER
----- Message from Becky Nielson sent at 3/28/2018  2:50 PM CDT -----  Contact: pt  Pt having getting her prescription filled, she can be reached at [phone] Thanks      [id number] - [address] AT [facility]  [address]  [place] [address]  Phone: [phone] Fax: [phone]

## 2018-03-29 ENCOUNTER — TELEPHONE (OUTPATIENT)
Dept: PULMONOLOGY | Facility: CLINIC | Age: 48
End: 2018-03-29

## 2018-04-17 ENCOUNTER — TELEPHONE (OUTPATIENT)
Dept: ORTHOPEDICS | Facility: CLINIC | Age: 48
End: 2018-04-17

## 2018-04-17 NOTE — TELEPHONE ENCOUNTER
----- Message from Adrianna Kat sent at 4/17/2018 11:24 AM CDT -----  Contact: Patient  Patient is checking on status of an injection in her knee, please call her back at [phone]. Thank you

## 2018-04-17 NOTE — TELEPHONE ENCOUNTER
Spoke with patient and rescheduled her Synvisc Injections. Patient states that this will be under Medicaid and not Work Comp. Patient advised to follow up with me when calling back to check on her approval.

## 2018-04-25 ENCOUNTER — TELEPHONE (OUTPATIENT)
Dept: PAIN MEDICINE | Facility: CLINIC | Age: 48
End: 2018-04-25

## 2018-04-25 ENCOUNTER — HOSPITAL ENCOUNTER (OUTPATIENT)
Dept: RADIOLOGY | Facility: HOSPITAL | Age: 48
Discharge: HOME | End: 2018-04-25
Attending: ANESTHESIOLOGY
Payer: MEDICAID

## 2018-04-25 ENCOUNTER — SURGERY (OUTPATIENT)
Age: 48
End: 2018-04-25

## 2018-04-25 DIAGNOSIS — M43.06 SPONDYLOLYSIS OF LUMBAR REGION: ICD-10-CM

## 2018-04-25 DIAGNOSIS — M70.62 TROCHANTERIC BURSITIS OF LEFT HIP: ICD-10-CM

## 2018-04-25 DIAGNOSIS — M53.3 SACROILIAC JOINT DYSFUNCTION: ICD-10-CM

## 2018-04-25 DIAGNOSIS — M47.816 LUMBAR SPONDYLOSIS: ICD-10-CM

## 2018-04-25 NOTE — TELEPHONE ENCOUNTER
----- Message from Eduard Gama sent at 4/25/2018  9:32 AM CDT -----  Contact: pt   Pt is requesting a call back from nurse in regards to rescheduling her appt that was set for today.                                             [phone] (home)

## 2018-04-30 ENCOUNTER — TELEPHONE (OUTPATIENT)
Dept: ORTHOPEDICS | Facility: CLINIC | Age: 48
End: 2018-04-30

## 2018-04-30 ENCOUNTER — TELEPHONE (OUTPATIENT)
Dept: PAIN MEDICINE | Facility: CLINIC | Age: 48
End: 2018-04-30

## 2018-04-30 NOTE — TELEPHONE ENCOUNTER
----- Message from Malissa Slade sent at 4/30/2018  9:26 AM CDT -----  Contact: pt  Please call pt about upcoming appointment on tomorrow and please advise [phone] (home)

## 2018-04-30 NOTE — TELEPHONE ENCOUNTER
Returned call to patient and informed her that Synvisc is not covered under her insurance but Euffflexa is the preferred medication. Informed patient of the process and that I would call her back when new orders are placed.

## 2018-05-01 ENCOUNTER — TELEPHONE (OUTPATIENT)
Dept: ORTHOPEDICS | Facility: CLINIC | Age: 48
End: 2018-05-01

## 2018-05-01 NOTE — TELEPHONE ENCOUNTER
Spoke with patient and informed her that I would have get her orders placed into the system and call her back with an appointment. Patient verbalized understanding.

## 2018-05-01 NOTE — TELEPHONE ENCOUNTER
----- Message from Becky Nielson sent at 5/1/2018  1:28 PM CDT -----  Contact: pt  Pt will like for the nurse to give her a call back, she can be reached at [phone] Thanks

## 2018-05-04 ENCOUNTER — HOSPITAL ENCOUNTER (OUTPATIENT)
Facility: HOSPITAL | Age: 48
Discharge: HOME | End: 2018-05-04
Attending: ANESTHESIOLOGY | Admitting: ANESTHESIOLOGY
Payer: MEDICAID

## 2018-05-04 ENCOUNTER — SURGERY (OUTPATIENT)
Age: 48
End: 2018-05-04

## 2018-05-04 ENCOUNTER — HOSPITAL ENCOUNTER (OUTPATIENT)
Dept: RADIOLOGY | Facility: HOSPITAL | Age: 48
Discharge: HOME | End: 2018-05-04
Attending: ANESTHESIOLOGY | Admitting: ANESTHESIOLOGY
Payer: MEDICAID

## 2018-05-04 VITALS
HEART RATE: 80 BPM | RESPIRATION RATE: 16 BRPM | TEMPERATURE: 98 F | DIASTOLIC BLOOD PRESSURE: 81 MMHG | SYSTOLIC BLOOD PRESSURE: 123 MMHG | OXYGEN SATURATION: 94 %

## 2018-05-04 DIAGNOSIS — M53.3 SACROILIAC JOINT DYSFUNCTION: ICD-10-CM

## 2018-05-04 PROCEDURE — 27096 INJECT SACROILIAC JOINT: CPT | Mod: TC

## 2018-05-04 PROCEDURE — 77002 NEEDLE LOCALIZATION BY XRAY: CPT | Mod: TC

## 2018-05-04 PROCEDURE — 25000003 PHARM REV CODE 250

## 2018-05-04 PROCEDURE — 63600175 PHARM REV CODE 636 W HCPCS

## 2018-05-04 PROCEDURE — 63600175 PHARM REV CODE 636 W HCPCS: Performed by: ANESTHESIOLOGY

## 2018-05-04 PROCEDURE — 27096 INJECT SACROILIAC JOINT: CPT | Mod: LT,,, | Performed by: ANESTHESIOLOGY

## 2018-05-04 PROCEDURE — 25000003 PHARM REV CODE 250: Performed by: ANESTHESIOLOGY

## 2018-05-04 PROCEDURE — 20610 DRAIN/INJ JOINT/BURSA W/O US: CPT | Mod: 59,LT,, | Performed by: ANESTHESIOLOGY

## 2018-05-04 PROCEDURE — 20610 DRAIN/INJ JOINT/BURSA W/O US: CPT | Mod: LT,59

## 2018-05-04 RX ORDER — DIAZEPAM 5 MG/1
5 TABLET ORAL ONCE
Status: COMPLETED | OUTPATIENT
Start: 2018-05-04 | End: 2018-05-04

## 2018-05-04 RX ORDER — METHYLPREDNISOLONE ACETATE 40 MG/ML
INJECTION, SUSPENSION INTRA-ARTICULAR; INTRALESIONAL; INTRAMUSCULAR; SOFT TISSUE
Status: DISCONTINUED | OUTPATIENT
Start: 2018-05-04 | End: 2018-05-04 | Stop reason: HOSPADM

## 2018-05-04 RX ORDER — LIDOCAINE HYDROCHLORIDE 10 MG/ML
INJECTION, SOLUTION EPIDURAL; INFILTRATION; INTRACAUDAL; PERINEURAL
Status: DISCONTINUED | OUTPATIENT
Start: 2018-05-04 | End: 2018-05-04 | Stop reason: HOSPADM

## 2018-05-04 RX ADMIN — LIDOCAINE HYDROCHLORIDE 5 ML: 10 INJECTION, SOLUTION EPIDURAL; INFILTRATION; INTRACAUDAL; PERINEURAL at 01:05

## 2018-05-04 RX ADMIN — METHYLPREDNISOLONE ACETATE 120 MG: 40 INJECTION, SUSPENSION INTRA-ARTICULAR; INTRALESIONAL; INTRAMUSCULAR; SOFT TISSUE at 01:05

## 2018-05-04 RX ADMIN — DIAZEPAM 5 MG: 5 TABLET ORAL at 11:05

## 2018-05-04 NOTE — DISCHARGE SUMMARY
.[facility]  Discharge Note       Description of Procedure: Left Sacroiliac Joint and Greater Trochanteric Bursa Injection under Fluoroscopic Guidance    Procedure Date: 5/4/2018    Admit Date: 5/4/2018  Discharge Date: 5/4/2018     Attending Physician: Nancy Seo   Discharge Provider: Nancy Seo    Preoperative Diagnosis: Sacroiliitis and Greater Trochanteric Bursitis     Postoperative Diagnosis: as above, same as preoperative diagnosis    Discharged Condition: Stable    Hospital Course: Patient was admitted for an outpatient procedure. The procedure was tolerated well with no complications.    Final Diagnoses: Same as principal problem.    Disposition: Home, self-care.    Follow up/Patient Instructions:  Follow-up in clinic in 2-3 weeks.    Medications: No medications were prescribed today. The patient was advised to resume normal medication regimen without change.  Specific information was provided regarding restarting any anticoagulant/s.    Discharge Procedure Orders (must include Diet, Follow-up, Activity):  Light activity for the remainder of the day, resume normal activity tomorrow. Resume normal diet. Follow-up in clinic in 2-3 weeks.

## 2018-05-04 NOTE — PLAN OF CARE
Problem: Patient Care Overview  Goal: Plan of Care Review  Outcome: Outcome(s) achieved Date Met: 05/04/18  Patient discharged home in stable condition via wheelchair with ride. Verbalized understanding of discharge instructions. Patient voiced no complaints at this time. Patient stood at side of bed, walked steps with no new motor or sensory deficits. Neurologically intact.

## 2018-05-04 NOTE — OP NOTE
PROCEDURE: Sacroiliac joint and Greater trochanteric bursa injection under fluoroscopic guidance       SIDE: LEFT Sacroiliac injection and LEFT greater trochanteric bursa injection      PROCEDURE DATE: 5/4/2018    PREOPERATIVE DIAGNOSIS: Sacroiliitis, greater trochanteric bursitis  POSTOPERATIVE DIAGNOSIS: Sacroiliitis, greater trochanteric bursitis    PROVIDER: Rayray Cruz MD  Assistant(s): none    Anesthesia: Local, Diazepam 5 mg po     >> 0 mg of VERSED    >> 0 mcg of FENTANYL     INDICATION: The patient has a history of pain due to sacroiliitis unresponsive to conservative treatments. Fluoroscopy was used to optimize visualization of needle placement and to maximize safety.       PROCEDURE DESCRIPTION: The patient was seen and identified in the preoperative area. Risks, benefits, complications, and alternatives were discussed with the patient. The patient agreed to proceed with the procedure and signed the consent. The site and side of the procedure was identified and marked.     The patient was taken to the procedural suite. The patient was positioned in prone orientation on procedure table. A time out was performed prior to any intervention. The procedure, site, side, and allergies were stated and agreed to by all present. The lumbosacral area extending to the skin overlying the femoral greater trochanter was widely prepped with ChloraPrep. The procedural site was draped in usual sterile fashion. Vital signs were closely monitored throughout this procedure.     The fluoroscopic camera was placed in contralateral oblique view and was adjusted until the anterior and posterior joint margins of the targeted sacroiliac joint aligned in linear array. The lower pole of the joint was identified, marked, and localized with 1% Lidocaine. A 22 gauge 5 inch spinal needle was introduced and advanced to the joint under fluoroscopic guidance. The joint space was entered and after negative aspiration, 2 mL of injectate was  posited into the joint space. The needle was then withdrawn outside of the joint space and after negative aspiration 1 mL of solution was injected outside of the joint space. The injectant solution used was comprised of 4 mL of 1% Lidocaine and 2 mL of Methylprednisolone (40 mg/mL). This techniques was performed for the above noted joint/s.    Following Sacroiliac Joint injection, the stylet was replaced and the needle was withdrawn intact. The LEFT greater trochanter was then identified with fluoroscopy. The targeted site of entry was localized with 1% PF Lidocaine. The above noted spinal needle was advanced to the lateral border of the greater trochanter until the osseus interface was met.  After negative aspiration, 2 mL of the above noted solution was injected. No pain or paresthesia was noted on injection. Following injection, the stylet was replaced and the needle was withdrawn intact. This technique was used for the above noted greater trochanteric bursa / bursae. The skin was cleaned and bandages were applied.    Description of Findings: Not applicable    Prosthetic devices, grafts, tissues, or devices implanted: None    Specimen Removed: No    Estimated Blood Loss: minimal    COMPLICATIONS: None    DISPOSITION / PLANS: The patient was transferred to the recovery area in a stable condition for observation. The patient was reexamined prior to discharge. There was no evidence of acute neurologic injury following the procedure.  Patient was discharged from the recovery room after meeting discharge criteria. Home discharge instructions were given to the patient by the staff.
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
## 2018-05-07 ENCOUNTER — TELEPHONE (OUTPATIENT)
Dept: ORTHOPEDICS | Facility: CLINIC | Age: 48
End: 2018-05-07

## 2018-05-07 NOTE — TELEPHONE ENCOUNTER
Called the patient about her appointment for 5/8/18 with Georges Alonso. Have to reschedule that appointment for another day due to the fact that Georges will be out of the office on that day. Left a message.FP

## 2018-05-08 ENCOUNTER — TELEPHONE (OUTPATIENT)
Dept: ORTHOPEDICS | Facility: CLINIC | Age: 48
End: 2018-05-08

## 2018-05-08 NOTE — TELEPHONE ENCOUNTER
Called pt to reschedule appt due to Synvisc being denied. Advised pt I would speak with Georges Alonso about trying a different injection. Pt vocalized understanding. Message routed to provider about Euflexxa.

## 2018-05-18 RX ORDER — METFORMIN HYDROCHLORIDE 750 MG/1
TABLET, EXTENDED RELEASE ORAL
Qty: 180 TABLET | Refills: 0 | OUTPATIENT
Start: 2018-05-18

## 2018-05-25 ENCOUNTER — OFFICE VISIT (OUTPATIENT)
Dept: PAIN MEDICINE | Facility: CLINIC | Age: 48
End: 2018-05-25
Payer: MEDICAID

## 2018-05-25 VITALS
HEART RATE: 81 BPM | SYSTOLIC BLOOD PRESSURE: 156 MMHG | RESPIRATION RATE: 18 BRPM | WEIGHT: 292 LBS | DIASTOLIC BLOOD PRESSURE: 98 MMHG | HEIGHT: 65 IN | BODY MASS INDEX: 48.65 KG/M2

## 2018-05-25 DIAGNOSIS — M47.816 LUMBAR SPONDYLOSIS: Primary | ICD-10-CM

## 2018-05-25 DIAGNOSIS — M43.06 SPONDYLOLYSIS OF LUMBAR REGION: ICD-10-CM

## 2018-05-25 DIAGNOSIS — M53.3 SACROILIAC JOINT DYSFUNCTION: ICD-10-CM

## 2018-05-25 DIAGNOSIS — M70.62 TROCHANTERIC BURSITIS OF LEFT HIP: ICD-10-CM

## 2018-05-25 DIAGNOSIS — M46.1 SACROILIITIS: ICD-10-CM

## 2018-05-25 PROCEDURE — 99214 OFFICE O/P EST MOD 30 MIN: CPT | Mod: S$PBB,,, | Performed by: PHYSICIAN ASSISTANT

## 2018-05-25 PROCEDURE — 99215 OFFICE O/P EST HI 40 MIN: CPT | Mod: PBBFAC | Performed by: PHYSICIAN ASSISTANT

## 2018-05-25 PROCEDURE — 99999 PR PBB SHADOW E&M-EST. PATIENT-LVL V: CPT | Mod: PBBFAC,,, | Performed by: PHYSICIAN ASSISTANT

## 2018-05-25 RX ORDER — CYCLOBENZAPRINE HCL 10 MG
10 TABLET ORAL 3 TIMES DAILY PRN
Qty: 90 TABLET | Refills: 1 | Status: SHIPPED | OUTPATIENT
Start: 2018-05-25 | End: 2018-06-24

## 2018-05-25 NOTE — PROGRESS NOTES
Chief Pain Complaint:  Low-back Pain      Interval History: Patient was seen on 5/4/18. At that time she underwent Left SI joint injection + left Trochanteric Bursa Injection with local. The patient reports that she is/was unchanged following the procedure.  She feels the pain is worse.      History of Present Illness:   Clarissa Turner is a 48 y.o. female  who is presenting with a chief complaint of Low-back Pain  . The patient began experiencing this problem insidiously, and the pain has been gradually worsening over the past 8 month(s). The pain is described as throbbing, cramping, aching and heavy and is located in the left buttock and left lateral thigh. Pain is intermittent and lasts hours. The  pain is nonradiating. The patient rates her pain a 8 out of ten and interferes with activities of daily living a 8 out of ten. Pain is exacerbated by getting up from a seated position, and is improved by rest, Percocet. Patient reports no prior trauma, no prior spinal surgery. She has a history of bilateral hip replacement and 2 left hip revisions.     - pertinent negatives: No fever, No chills, No weight loss, No bladder dysfunction, No bowel dysfunction, No saddle anesthesia  - pertinent positives: generalized nonspecific Lower Extremity weakness bilaterally    - medications, other therapies tried (physical therapy, injections):     >> NSAIDs, Tylenol, Tramadol, Norco, Percocet, gabapentin, zanaflex and robaxin    >> Has previously undergone Physical Therapy    >> Injections tried:    - Left SI joint injection + left Trochanteric Bursa Injection with local on 5/4/18 with no relief        Imaging / Labs / Studies (reviewed on 5/25/2018):    3/26/2018 X-Ray Lumbar Complete With Flex And Ext  TECHNIQUE:  Five views of the lumbar spine plus flexion extension views were performed.  COMPARISON:  CT abdomen/pelvis January 5, 2017  FINDINGS:  Vertebral body height, disc spaces and alignment are well maintained.   "There is no definite pars defects or spondylolisthesis.  No subluxation or evidence of instability noted on flexion and extension views.  Multilevel facet arthropathy.  Pedicles and SI joints appear intact.  Postsurgical changes noted involving both hips.  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          Review of Systems:  CONSTITUTIONAL: patient denies any fever, chills, or weight loss  SKIN: patient denies any rash or itching  RESPIRATORY: patient denies having any shortness of breath  GASTROINTESTINAL: patient denies having any diarrhea, constipation, or bowel incontinence  GENITOURINARY: patient denies having any abnormal bladder function    MUSCULOSKELETAL:  - patient complains of the above noted pain/s (see chief pain complaint)    NEUROLOGICAL:   - pain as above  - strength in Lower extremities is intact, BILATERALLY  - sensation in Lower extremities is intact, BILATERALLY  - patient denies any loss of bowel or bladder control      PSYCHIATRIC: patient denies any change in mood    Other:  All other systems reviewed and are negative        Physical Exam:  Vitals:  BP (!) 156/98 (BP Location: Right arm, Patient Position: Sitting, BP Method: Medium (Automatic))   Pulse 81   Resp 18   Ht 5' 5" (1.651 m)   Wt 132.5 kg (292 lb)   LMP 04/14/2015 (Exact Date)   BMI 48.59 kg/m²   (reviewed on 5/25/2018)    General: alert and oriented, in no apparent distress, morbidly obese.  Gait: normal gait.  Skin: no rashes, no discoloration, no obvious lesions  HEENT: normocephalic, atraumatic. Pupils equal and round.  Cardiovascular: no significant peripheral edema present.  Respiratory: without use of accessory muscles of respiration.    Musculoskeletal - Lumbar Spine:  - Pain on flexion of lumbar spine: Absent   - Pain on extension of lumbar spine: Absent Present  - Lumbar facet loading: Absent   - TTP over the lumbar facet joints: Absent  - TTP over the SI joints:  Present on Left  - TTP over the " Trochanteric Bursa: Present on Left  - Straight Leg Raise: Negative  - HEIKE: Positive    Neuro - Lower Extremities:  - RLE Strength:     >> 5/5 strength in right ankle with plantar and dorsiflexion    >> 5/5 strength with right knee flexion extension  - LLE Strength:     >> 5/5 strength in left ankle with plantar and dorsiflexion    >> 5/5 strength with knee flexion extension on the left   - BLE Strength: R/L: HF: 5/5, HE: 5/5, KF: 5/5; KE: 5/5; FE: 5/5; FF: 5/5  - Extremity Reflexes: Brisk and symmetric throughout  - Sensory: Sensation to light touch intact bilaterally      Psych:  Mood and affect is appropriate          Assessment:  Clarissa Turner is a 48 y.o. year old female who is presenting with     Encounter Diagnoses   Name Primary?    Lumbar spondylosis Yes    Spondylolysis of lumbar region     Sacroiliac joint dysfunction     Trochanteric bursitis of left hip     Sacroiliitis        Plan:  1. Interventional: S/p Left SI joint injection + left Trochanteric Bursa Injection with local on 5/4/18 with no relief.    2. Pharmacologic:  - Start Flexeril 10mg TID PRN. D/c Robaxin 500mg QID.   - Order 2.5% nabumetone, 2.5% gabapentin, 2.25% lidocaine, 2.25% prilocaine compound cream for potential topical pain relief. Patent will be contacted for [facility] regarding cost and payment.     - Continue Gabapentin 800 mg po TID.   - No NSAID's secondary to coumadin use. Tylenol Prn.    - Patient has been on Butrans patch and Percocet 10/325 mg PO TID PRN for >6 months. Last prescribed in October, 2017.  We will not continue this; patient was only previously given bridge to injection.    3. Rehabilitative:   - Start PT with passive modalities, including ice and heat, and active modalities, including a regimen for stretching and strengthening. Order sent internally to include dry needling.   - We discussed the important of regular exercise in extreme detail.  I encourage her to increase  ambulation as a start.     4. Diagnostic: None.    5. Follow up: PRN    - I discussed the risks, benefits, and alternatives to potential treatment options. All questions and concerns were fully addressed today in clinic. Dr. Cruz was consulted regarding the patient plan and agrees.

## 2018-06-28 RX ORDER — DIAZEPAM 5 MG/1
TABLET ORAL
Qty: 30 TABLET | Refills: 0 | OUTPATIENT
Start: 2018-06-28

## 2018-06-28 RX ORDER — FLUTICASONE PROPIONATE 50 MCG
1 SPRAY, SUSPENSION (ML) NASAL DAILY
Qty: 1 BOTTLE | Refills: 0 | Status: SHIPPED | OUTPATIENT
Start: 2018-06-28

## 2018-06-28 NOTE — TELEPHONE ENCOUNTER
Pt not been seen by any one in Primary Care since early 2017. Secondly, given she is on controlled substances with Pain management approval for Valium would need to be discussed with them before dispensing

## 2018-06-29 ENCOUNTER — TELEPHONE (OUTPATIENT)
Dept: ORTHOPEDICS | Facility: CLINIC | Age: 48
End: 2018-06-29

## 2018-06-29 NOTE — TELEPHONE ENCOUNTER
Returned call to patient and informed her that her Synvisc One Injection was denied. Scheduled patient an appointment to follow up per her request.

## 2018-06-29 NOTE — TELEPHONE ENCOUNTER
----- Message from Ye Purdy sent at 6/29/2018  9:27 AM CDT -----  Contact: self [phone]  Would like to consult with nurse regarding medication for knee. Please call back at [phone]. Md Niraj

## 2018-06-29 NOTE — TELEPHONE ENCOUNTER
Patient stated that she is now seeing a physician at the [facility]. Her insurance does not cover [facility]. She stated that she told the pharmacist when she needed a refill on the flonase to send it to her new PCP and they sent it to you

## 2018-07-23 ENCOUNTER — TELEPHONE (OUTPATIENT)
Dept: PAIN MEDICINE | Facility: CLINIC | Age: 48
End: 2018-07-23

## 2018-07-23 NOTE — TELEPHONE ENCOUNTER
----- Message from Loc Gross sent at 7/23/2018  2:13 PM CDT -----  Contact: Clarissa [phone]  Patient is in need of a much sooner appt that 8/28. Please contact pt at [phone]

## 2018-07-24 ENCOUNTER — OFFICE VISIT (OUTPATIENT)
Dept: PAIN MEDICINE | Facility: CLINIC | Age: 48
End: 2018-07-24
Payer: MEDICAID

## 2018-07-24 VITALS — SYSTOLIC BLOOD PRESSURE: 129 MMHG | DIASTOLIC BLOOD PRESSURE: 89 MMHG | HEART RATE: 97 BPM

## 2018-07-24 DIAGNOSIS — M47.816 LUMBAR SPONDYLOSIS: Primary | ICD-10-CM

## 2018-07-24 DIAGNOSIS — M75.01 ADHESIVE CAPSULITIS OF RIGHT SHOULDER: ICD-10-CM

## 2018-07-24 DIAGNOSIS — M23.92 DERANGEMENT OF COLLATERAL LIGAMENT OF LEFT KNEE: ICD-10-CM

## 2018-07-24 DIAGNOSIS — M70.62 TROCHANTERIC BURSITIS OF LEFT HIP: ICD-10-CM

## 2018-07-24 DIAGNOSIS — M53.3 SACROILIAC JOINT PAIN: ICD-10-CM

## 2018-07-24 PROCEDURE — 99213 OFFICE O/P EST LOW 20 MIN: CPT | Mod: PBBFAC,PO | Performed by: ANESTHESIOLOGY

## 2018-07-24 PROCEDURE — 99214 OFFICE O/P EST MOD 30 MIN: CPT | Mod: S$PBB,,, | Performed by: ANESTHESIOLOGY

## 2018-07-24 PROCEDURE — 99999 PR PBB SHADOW E&M-EST. PATIENT-LVL III: CPT | Mod: PBBFAC,,, | Performed by: ANESTHESIOLOGY

## 2018-07-24 RX ORDER — TRAMADOL HYDROCHLORIDE 50 MG/1
50 TABLET ORAL
Qty: 50 TABLET | Refills: 0 | Status: SHIPPED | OUTPATIENT
Start: 2018-07-24 | End: 2018-08-23

## 2018-07-24 RX ORDER — CYCLOBENZAPRINE HCL 10 MG
10 TABLET ORAL 3 TIMES DAILY PRN
Qty: 90 TABLET | Refills: 0 | Status: SHIPPED | OUTPATIENT
Start: 2018-07-24 | End: 2018-08-23

## 2018-07-24 NOTE — PROGRESS NOTES
Chief Pain Complaint:  Hip Pain; Knee Pain; and Shoulder Pain      Interval History: Patient was seen on 5/4/18. At that time she underwent Left SI joint injection + left Trochanteric Bursa Injection with local. The patient reports that she is/was unchanged following the procedure.  She feels the pain is worse.      History of Present Illness:   Clarissa Turner is a 48 y.o. female  who is presenting with a chief complaint of Hip Pain; Knee Pain; and Shoulder Pain  . The patient began experiencing this problem insidiously, and the pain has been gradually worsening over the past 8 month(s). The pain is described as throbbing, cramping, aching and heavy and is located in the left buttock and left lateral thigh. Pain is intermittent and lasts hours. The  pain is nonradiating. The patient rates her pain a 8 out of ten and interferes with activities of daily living a 8 out of ten. Pain is exacerbated by getting up from a seated position, and is improved by rest, Percocet. Patient reports no prior trauma, no prior spinal surgery. She has a history of bilateral hip replacement and 2 left hip revisions.     - pertinent negatives: No fever, No chills, No weight loss, No bladder dysfunction, No bowel dysfunction, No saddle anesthesia  - pertinent positives: generalized nonspecific Lower Extremity weakness bilaterally    - medications, other therapies tried (physical therapy, injections):     >> NSAIDs, Tylenol, Tramadol, Norco, Percocet, gabapentin, zanaflex and robaxin    >> Has previously undergone Physical Therapy    >> Injections tried:    - Left SI joint injection + left Trochanteric Bursa Injection with local on 5/4/18 with no relief        Imaging / Labs / Studies (reviewed on 7/24/2018):    3/26/2018 X-Ray Lumbar Complete With Flex And Ext  TECHNIQUE:  Five views of the lumbar spine plus flexion extension views were performed.  COMPARISON:  CT abdomen/pelvis January 5, 2017  FINDINGS:  Vertebral body height,  disc spaces and alignment are well maintained.  There is no definite pars defects or spondylolisthesis.  No subluxation or evidence of instability noted on flexion and extension views.  Multilevel facet arthropathy.  Pedicles and SI joints appear intact.  Postsurgical changes noted involving both hips.  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          Review of Systems:  CONSTITUTIONAL: patient denies any fever, chills, or weight loss  SKIN: patient denies any rash or itching  RESPIRATORY: patient denies having any shortness of breath  GASTROINTESTINAL: patient denies having any diarrhea, constipation, or bowel incontinence  GENITOURINARY: patient denies having any abnormal bladder function    MUSCULOSKELETAL:  - patient complains of the above noted pain/s (see chief pain complaint)    NEUROLOGICAL:   - pain as above  - strength in Lower extremities is intact, BILATERALLY  - sensation in Lower extremities is intact, BILATERALLY  - patient denies any loss of bowel or bladder control      PSYCHIATRIC: patient denies any change in mood    Other:  All other systems reviewed and are negative        Physical Exam:  Vitals:  /89 (BP Location: Right arm, Patient Position: Sitting)   Pulse 97   LMP 04/14/2015 (Exact Date)   (reviewed on 7/24/2018)    General: alert and oriented, in no apparent distress, morbidly obese.  Gait: normal gait.  Skin: no rashes, no discoloration, no obvious lesions  HEENT: normocephalic, atraumatic. Pupils equal and round.  Cardiovascular: no significant peripheral edema present.  Respiratory: without use of accessory muscles of respiration.    Musculoskeletal - Lumbar Spine:  - Pain on flexion of lumbar spine: Absent   - Pain on extension of lumbar spine: Absent Present  - Lumbar facet loading: Absent   - TTP over the lumbar facet joints: Absent  - TTP over the SI joints:  Present on Left  - TTP over the Trochanteric Bursa: Present on Left  - Straight Leg Raise: Negative  -  HEIKE: Positive    Neuro - Lower Extremities:  - RLE Strength:     >> 5/5 strength in right ankle with plantar and dorsiflexion    >> 5/5 strength with right knee flexion extension  - LLE Strength:     >> 5/5 strength in left ankle with plantar and dorsiflexion    >> 5/5 strength with knee flexion extension on the left   - BLE Strength: R/L: HF: 5/5, HE: 5/5, KF: 5/5; KE: 5/5; FE: 5/5; FF: 5/5  - Extremity Reflexes: Brisk and symmetric throughout  - Sensory: Sensation to light touch intact bilaterally      Psych:  Mood and affect is appropriate          Assessment:  Clarissa Turner is a 48 y.o. year old female who is presenting with     Encounter Diagnoses   Name Primary?    Derangement of collateral ligament of left knee     Adhesive capsulitis of right shoulder     Lumbar spondylosis Yes    Sacroiliac joint pain     Trochanteric bursitis of left hip        Plan:  1. Interventional: None for now.  S/p Left SI joint injection + left Trochanteric Bursa Injection with local on 5/4/18 with no relief.    2. Pharmacologic: Tramadol 50 mg PO Qd ay PRN (50 tabs) so patient can engage in PT. Will not continue. Continue Flexeril 10mg TID PRN. Continue compound cream. Continue Gabapentin 800 mg po Qhs. No NSAID's secondary to coumadin use. No Tylenol secondary to allergy.  Patient has been on Butrans patch and Percocet 10/325 mg PO TID PRN for >6 months. Last prescribed in October, 2017.  We will not continue this.    3. Rehabilitative: Internal referral to PT sent again. PT with passive modalities, including ice and heat, and active modalities, including a regimen for stretching and strengthening. Order sent internally to include dry needling.   We discussed the important of regular exercise in extreme detail.  I encourage her to increase ambulation as a start. Back Brace ordered.     4. Diagnostic: None.    5. Follow up: PRN

## 2018-07-25 ENCOUNTER — TELEPHONE (OUTPATIENT)
Dept: PHYSICAL MEDICINE AND REHAB | Facility: CLINIC | Age: 48
End: 2018-07-25

## 2018-07-25 NOTE — TELEPHONE ENCOUNTER
----- Message from Charlotte Waddell sent at 7/25/2018  4:03 PM CDT -----  Contact: pt  States she would like to get a prescription for the back brace faxed to her. Fax# [phone]. Please call pt at [phone]. Thank you

## 2018-08-02 ENCOUNTER — OFFICE VISIT (OUTPATIENT)
Dept: ORTHOPEDICS | Facility: CLINIC | Age: 48
End: 2018-08-02
Payer: MEDICAID

## 2018-08-02 VITALS
DIASTOLIC BLOOD PRESSURE: 89 MMHG | RESPIRATION RATE: 18 BRPM | HEART RATE: 103 BPM | SYSTOLIC BLOOD PRESSURE: 143 MMHG | HEIGHT: 65 IN | WEIGHT: 292 LBS | BODY MASS INDEX: 48.65 KG/M2

## 2018-08-02 DIAGNOSIS — M17.11 ARTHRITIS OF RIGHT KNEE: Primary | ICD-10-CM

## 2018-08-02 PROCEDURE — 99999 PR PBB SHADOW E&M-EST. PATIENT-LVL V: CPT | Mod: PBBFAC,,, | Performed by: PHYSICIAN ASSISTANT

## 2018-08-02 PROCEDURE — 99215 OFFICE O/P EST HI 40 MIN: CPT | Mod: PBBFAC,PO | Performed by: PHYSICIAN ASSISTANT

## 2018-08-02 PROCEDURE — 99213 OFFICE O/P EST LOW 20 MIN: CPT | Mod: S$PBB,,, | Performed by: PHYSICIAN ASSISTANT

## 2018-08-02 RX ORDER — MELOXICAM 7.5 MG/1
7.5 TABLET ORAL DAILY
Qty: 30 TABLET | Refills: 1 | Status: SHIPPED | OUTPATIENT
Start: 2018-08-02 | End: 2018-11-20
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
## 2018-08-07 ENCOUNTER — CLINICAL SUPPORT (OUTPATIENT)
Dept: REHABILITATION | Facility: HOSPITAL | Age: 48
End: 2018-08-07
Attending: ANESTHESIOLOGY
Payer: MEDICAID

## 2018-08-07 DIAGNOSIS — M25.552 HIP PAIN, CHRONIC, LEFT: ICD-10-CM

## 2018-08-07 DIAGNOSIS — M53.3 SACROILIAC JOINT DYSFUNCTION: Primary | ICD-10-CM

## 2018-08-07 DIAGNOSIS — G89.29 HIP PAIN, CHRONIC, LEFT: ICD-10-CM

## 2018-08-07 DIAGNOSIS — G89.29 CHRONIC LEFT-SIDED LOW BACK PAIN WITHOUT SCIATICA: ICD-10-CM

## 2018-08-07 DIAGNOSIS — M54.50 CHRONIC LEFT-SIDED LOW BACK PAIN WITHOUT SCIATICA: ICD-10-CM

## 2018-08-07 PROCEDURE — 97014 ELECTRIC STIMULATION THERAPY: CPT

## 2018-08-07 PROCEDURE — 97110 THERAPEUTIC EXERCISES: CPT

## 2018-08-07 PROCEDURE — 97161 PT EVAL LOW COMPLEX 20 MIN: CPT
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
## 2018-08-13 DIAGNOSIS — M25.551 RIGHT HIP PAIN: ICD-10-CM

## 2018-08-13 DIAGNOSIS — Z96.643 HISTORY OF BILATERAL HIP REPLACEMENTS: ICD-10-CM

## 2018-08-13 DIAGNOSIS — L90.5 SCAR CONDITION AND FIBROSIS OF SKIN: ICD-10-CM

## 2018-08-13 DIAGNOSIS — M25.552 LEFT HIP PAIN: Primary | ICD-10-CM

## 2018-08-20 ENCOUNTER — OFFICE VISIT (OUTPATIENT)
Dept: OPHTHALMOLOGY | Facility: CLINIC | Age: 48
End: 2018-08-20
Payer: MEDICAID

## 2018-08-20 DIAGNOSIS — Z46.0 ENCOUNTER FOR FITTING OR ADJUSTMENT OF SPECTACLES OR CONTACT LENSES: ICD-10-CM

## 2018-08-20 DIAGNOSIS — E11.9 DIABETES MELLITUS TYPE 2 WITHOUT RETINOPATHY: Primary | ICD-10-CM

## 2018-08-20 DIAGNOSIS — H52.4 BILATERAL PRESBYOPIA: ICD-10-CM

## 2018-08-20 DIAGNOSIS — H52.13 MYOPIA WITH ASTIGMATISM AND PRESBYOPIA, BILATERAL: ICD-10-CM

## 2018-08-20 DIAGNOSIS — H52.203 MYOPIA WITH ASTIGMATISM AND PRESBYOPIA, BILATERAL: ICD-10-CM

## 2018-08-20 DIAGNOSIS — H52.4 MYOPIA WITH ASTIGMATISM AND PRESBYOPIA, BILATERAL: ICD-10-CM

## 2018-08-20 PROCEDURE — 92310 CONTACT LENS FITTING OU: CPT | Mod: ,,, | Performed by: OPTOMETRIST

## 2018-08-20 PROCEDURE — 99211 OFF/OP EST MAY X REQ PHY/QHP: CPT | Mod: PBBFAC,PO | Performed by: OPTOMETRIST

## 2018-08-20 PROCEDURE — 92014 COMPRE OPH EXAM EST PT 1/>: CPT | Mod: S$PBB,,, | Performed by: OPTOMETRIST

## 2018-08-20 PROCEDURE — 99999 PR PBB SHADOW E&M-EST. PATIENT-LVL I: CPT | Mod: PBBFAC,,, | Performed by: OPTOMETRIST

## 2018-08-20 PROCEDURE — 92015 DETERMINE REFRACTIVE STATE: CPT | Mod: ,,, | Performed by: OPTOMETRIST

## 2018-08-20 NOTE — PROGRESS NOTES
HPI     NIDDM exam.  No visual complaints.  Update glasses and contact lenses RX.  Discuss fee to update contact lenses.      Last edited by No Alvarez on 8/20/2018 10:20 AM. (History)            Assessment /Plan     For exam results, see Encounter Report.    Diabetes mellitus type 2 without retinopathy    Encounter for fitting or adjustment of spectacles or contact lenses    Myopia with astigmatism and presbyopia, bilateral    Bilateral presbyopia      No Background Diabetic Retinopathy    Discussed CL charges.  Dispense Final Rx for glasses  Note changes CL Rx  RTC 1 year  Discussed above and answered questions.

## 2018-09-04 ENCOUNTER — CLINICAL SUPPORT (OUTPATIENT)
Dept: REHABILITATION | Facility: HOSPITAL | Age: 48
End: 2018-09-04
Attending: ANESTHESIOLOGY
Payer: MEDICAID

## 2018-09-04 DIAGNOSIS — M53.3 SACROILIAC JOINT DYSFUNCTION: ICD-10-CM

## 2018-09-04 DIAGNOSIS — G89.29 HIP PAIN, CHRONIC, LEFT: ICD-10-CM

## 2018-09-04 DIAGNOSIS — M25.552 HIP PAIN, CHRONIC, LEFT: ICD-10-CM

## 2018-09-04 DIAGNOSIS — G89.29 CHRONIC LEFT-SIDED LOW BACK PAIN WITHOUT SCIATICA: ICD-10-CM

## 2018-09-04 DIAGNOSIS — M54.50 CHRONIC LEFT-SIDED LOW BACK PAIN WITHOUT SCIATICA: ICD-10-CM

## 2018-09-04 DIAGNOSIS — M25.551 RIGHT HIP PAIN: Primary | ICD-10-CM

## 2018-09-04 PROCEDURE — 97014 ELECTRIC STIMULATION THERAPY: CPT

## 2018-09-04 PROCEDURE — 97110 THERAPEUTIC EXERCISES: CPT

## 2018-09-04 PROCEDURE — 97140 MANUAL THERAPY 1/> REGIONS: CPT
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
## 2018-09-11 ENCOUNTER — CLINICAL SUPPORT (OUTPATIENT)
Dept: REHABILITATION | Facility: HOSPITAL | Age: 48
End: 2018-09-11
Attending: ANESTHESIOLOGY
Payer: MEDICAID

## 2018-09-11 DIAGNOSIS — G89.29 HIP PAIN, CHRONIC, LEFT: ICD-10-CM

## 2018-09-11 DIAGNOSIS — M54.50 CHRONIC LEFT-SIDED LOW BACK PAIN WITHOUT SCIATICA: ICD-10-CM

## 2018-09-11 DIAGNOSIS — M25.551 RIGHT HIP PAIN: Primary | ICD-10-CM

## 2018-09-11 DIAGNOSIS — G89.29 CHRONIC LEFT-SIDED LOW BACK PAIN WITHOUT SCIATICA: ICD-10-CM

## 2018-09-11 DIAGNOSIS — M25.552 HIP PAIN, CHRONIC, LEFT: ICD-10-CM

## 2018-09-11 DIAGNOSIS — M53.3 SACROILIAC JOINT DYSFUNCTION: ICD-10-CM

## 2018-09-11 PROCEDURE — 97110 THERAPEUTIC EXERCISES: CPT

## 2018-09-11 PROCEDURE — 97014 ELECTRIC STIMULATION THERAPY: CPT

## 2018-09-11 PROCEDURE — 97140 MANUAL THERAPY 1/> REGIONS: CPT
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
## 2018-09-17 ENCOUNTER — TELEPHONE (OUTPATIENT)
Dept: OBSTETRICS AND GYNECOLOGY | Facility: CLINIC | Age: 48
End: 2018-09-17

## 2018-09-17 NOTE — TELEPHONE ENCOUNTER
----- Message from Paulina Pinto sent at 9/17/2018  2:21 PM CDT -----  Contact: Patient  Patient called and scheduled her Annual on 11/6/18; she would like to have her Mammo done on that same day. She can be contacted at [phone].    Thanks,  Paulina
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
## 2018-09-18 ENCOUNTER — CLINICAL SUPPORT (OUTPATIENT)
Dept: REHABILITATION | Facility: HOSPITAL | Age: 48
End: 2018-09-18
Attending: ANESTHESIOLOGY
Payer: MEDICAID

## 2018-09-18 DIAGNOSIS — M25.551 RIGHT HIP PAIN: Primary | ICD-10-CM

## 2018-09-18 DIAGNOSIS — M54.50 CHRONIC LEFT-SIDED LOW BACK PAIN WITHOUT SCIATICA: ICD-10-CM

## 2018-09-18 DIAGNOSIS — M25.552 HIP PAIN, CHRONIC, LEFT: ICD-10-CM

## 2018-09-18 DIAGNOSIS — G89.29 CHRONIC LEFT-SIDED LOW BACK PAIN WITHOUT SCIATICA: ICD-10-CM

## 2018-09-18 DIAGNOSIS — M53.3 SACROILIAC JOINT DYSFUNCTION: ICD-10-CM

## 2018-09-18 DIAGNOSIS — G89.29 HIP PAIN, CHRONIC, LEFT: ICD-10-CM

## 2018-09-18 PROCEDURE — 97014 ELECTRIC STIMULATION THERAPY: CPT

## 2018-09-18 PROCEDURE — 97110 THERAPEUTIC EXERCISES: CPT

## 2018-09-18 PROCEDURE — 97140 MANUAL THERAPY 1/> REGIONS: CPT
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
## 2018-09-20 ENCOUNTER — CLINICAL SUPPORT (OUTPATIENT)
Dept: REHABILITATION | Facility: HOSPITAL | Age: 48
End: 2018-09-20
Attending: ANESTHESIOLOGY
Payer: MEDICAID

## 2018-09-20 DIAGNOSIS — M53.3 SACROILIAC JOINT DYSFUNCTION: ICD-10-CM

## 2018-09-20 DIAGNOSIS — G89.29 HIP PAIN, CHRONIC, LEFT: ICD-10-CM

## 2018-09-20 DIAGNOSIS — M25.552 HIP PAIN, CHRONIC, LEFT: ICD-10-CM

## 2018-09-20 DIAGNOSIS — M54.50 CHRONIC LEFT-SIDED LOW BACK PAIN WITHOUT SCIATICA: ICD-10-CM

## 2018-09-20 DIAGNOSIS — M25.551 RIGHT HIP PAIN: Primary | ICD-10-CM

## 2018-09-20 DIAGNOSIS — G89.29 CHRONIC LEFT-SIDED LOW BACK PAIN WITHOUT SCIATICA: ICD-10-CM

## 2018-09-20 PROCEDURE — 97150 GROUP THERAPEUTIC PROCEDURES: CPT

## 2018-09-20 PROCEDURE — 97140 MANUAL THERAPY 1/> REGIONS: CPT

## 2018-09-20 PROCEDURE — 97014 ELECTRIC STIMULATION THERAPY: CPT

## 2018-09-24 ENCOUNTER — OFFICE VISIT (OUTPATIENT)
Dept: PULMONOLOGY | Facility: CLINIC | Age: 48
End: 2018-09-24
Payer: MEDICAID

## 2018-09-24 VITALS
BODY MASS INDEX: 48.82 KG/M2 | RESPIRATION RATE: 18 BRPM | HEIGHT: 65 IN | SYSTOLIC BLOOD PRESSURE: 120 MMHG | HEART RATE: 70 BPM | OXYGEN SATURATION: 98 % | WEIGHT: 293 LBS | DIASTOLIC BLOOD PRESSURE: 70 MMHG

## 2018-09-24 DIAGNOSIS — G47.33 OSA ON CPAP: Primary | ICD-10-CM

## 2018-09-24 DIAGNOSIS — Z86.718 HISTORY OF DVT OF LOWER EXTREMITY: ICD-10-CM

## 2018-09-24 DIAGNOSIS — Z86.711 HISTORY OF PULMONARY EMBOLISM: ICD-10-CM

## 2018-09-24 PROCEDURE — 99999 PR PBB SHADOW E&M-EST. PATIENT-LVL V: CPT | Mod: PBBFAC,,, | Performed by: INTERNAL MEDICINE

## 2018-09-24 PROCEDURE — 99214 OFFICE O/P EST MOD 30 MIN: CPT | Mod: S$PBB,,, | Performed by: INTERNAL MEDICINE

## 2018-09-24 PROCEDURE — 99215 OFFICE O/P EST HI 40 MIN: CPT | Mod: PBBFAC | Performed by: INTERNAL MEDICINE

## 2018-09-24 RX ORDER — ALBUTEROL SULFATE 90 UG/1
2 AEROSOL, METERED RESPIRATORY (INHALATION)
COMMUNITY
Start: 2018-07-20 | End: 2018-09-24

## 2018-09-24 RX ORDER — MOMETASONE FUROATE AND FORMOTEROL FUMARATE DIHYDRATE 100; 5 UG/1; UG/1
AEROSOL RESPIRATORY (INHALATION)
Refills: 5 | COMMUNITY
Start: 2018-07-20 | End: 2018-09-24

## 2018-09-24 NOTE — PROGRESS NOTES
Pulmonary Outpatient Follow Up Visit     Subjective:    This patient is new to me.   Patient ID: Clarissa Turner is a 48 y.o. female.    Chief Complaint: Sleep Apnea      HPI  48-year-old female patient presenting for follow-up.    She is requesting supplies for CPAP.    She is using her CPAP machine suboptimally.    Three months ago she complained of shortness of breaths coughing and chest discomfort, she was started on albuterol and Dulera, however she felt better she had a pulmonary function test which was within normal as per [facility] at [facility], she is not using inhalers for the last month.    Patient is following with [facility]ist at [facility].    At the moment no cough no shortness of Breath no wheezing no chest pain.    She has a history of DVT and PE on lifelong Coumadin.  Clots were recurrent but thought to be related to postop hip surgery.    Review of Systems   Constitutional: Positive for fatigue.   HENT: Negative for nosebleeds.    Eyes: Negative for redness.   Respiratory: Positive for apnea, snoring and somnolence. Negative for cough and use of rescue inhaler.         History of pulmonary embolism   Cardiovascular: Negative for chest pain.   Genitourinary: Positive for hematuria.   Endocrine: Diabetes mellitus  Hypothyroid    Musculoskeletal: Positive for arthralgias and gait problem.   Gastrointestinal: Positive for acid reflux.   Neurological: Negative for syncope.   Hematological: Negative for adenopathy.        History of DVT  Anticoagulated on Coumadin     Psychiatric/Behavioral: Positive for sleep disturbance.         Outpatient Encounter Medications as of 9/24/2018   Medication Sig Dispense Refill    blood sugar diagnostic Strp Use to check blood sugars twice daily. 100 strip 4    blood-glucose meter (ONETOUCH VERIO IQ METER) kit Use as instructed 1 each 0    DOCUSATE CALCIUM (STOOL SOFTENER ORAL) Take 2 tablets by mouth as needed.     "   fluticasone (FLONASE) 50 mcg/actuation nasal spray 1 spray (50 mcg total) by Each Nare route once daily. 1 Bottle 0    gabapentin (NEURONTIN) 300 MG capsule Take 800 mg by mouth every evening.       glipiZIDE (GLUCOTROL) 5 MG tablet Take 1 tablet (5 mg total) by mouth daily with breakfast. 90 tablet 2    lancets Misc Use to check blood sugars twice daily. 100 each 4    lidocaine (LIDODERM) 5 % APPLY 1 PATCH FOR 12 HOURS THEN 12 HOURS OFF PRN P  2    meloxicam (MOBIC) 7.5 MG tablet Take 1 tablet (7.5 mg total) by mouth once daily. 30 tablet 1    pantoprazole (PROTONIX) 40 MG tablet Take 1 tablet (40 mg total) by mouth once daily. 30 tablet 3    pen needle, diabetic 31 gauge x 1/4" Ndle USE ONE  4 TIMES DAILY WITH MEALS AND NIGHTLY 150 each 0    warfarin (COUMADIN) 10 MG tablet TAKE 2 TABLETS BY MOUTH DAILY EXCEPT 1 TABLET ON MONDAY, TUESDAYS, WEDNESDAY AS DIRECTED BY THE COUMADIN CLINIC DISCONTINUE 6 MG TABLET 44 tablet 5    [DISCONTINUED] albuterol (PROVENTIL/VENTOLIN HFA) 90 mcg/actuation inhaler Inhale 2 puffs into the lungs.      [DISCONTINUED] DULERA 100-5 mcg/actuation HFAA INL 1 PUFF ITL BID  5    insulin aspart (NOVOLOG) 100 unit/mL InPn pen Take per sliding scale daily      insulin glargine (LANTUS SOLOSTAR) 100 unit/mL (3 mL) InPn pen Inject 30 Units into the skin every evening. 2 Box 1    levothyroxine (SYNTHROID) 75 MCG tablet Take 1 tablet (75 mcg total) by mouth once daily. Avoid calcium, iron, food, or fiber within 1 hour of taking medication. 90 tablet 2    loratadine (CLARITIN) 10 mg tablet Take 1 tablet (10 mg total) by mouth once daily.  0    metformin (GLUCOPHAGE-XR) 750 MG 24 hr tablet Take 1 tablet (750 mg total) by mouth 2 (two) times daily with meals. 180 tablet 2    mirtazapine (REMERON) 30 MG tablet Take 15 mg by mouth every evening.      oxyCODONE-acetaminophen (PERCOCET)  mg per tablet OXYCODONE-ACETAMINOPHEN (Percocet)  MG ORAL TAB - 1 tab po q 24 hrs prn " "pain 45 tablet 0     Facility-Administered Encounter Medications as of 9/24/2018   Medication Dose Route Frequency Provider Last Rate Last Dose    hyaluronate 16 mg/2 mL injection 16 mg  16 mg Intra-articular 1 time in Clinic/HOD Georges Alonso PA-C        hylan g-f 20 48 mg/6 mL injection 48 mg  48 mg Intra-articular 1 time in Clinic/HOD Georges Alonso PA-C        phytonadione (vitamin K1) tablet 5 mg  5 mg Oral 1 time in Clinic/HOD Luzmaria Quiñones MD           Objective:     Vital Signs (Most Recent)  Vital Signs  Pulse: 70  Resp: 18  SpO2: 98 %  BP: 120/70  Height and Weight  Height: 5' 5" (165.1 cm)  Weight: (!) 146 kg (321 lb 14 oz)  BSA (Calculated - sq m): 2.59 sq meters  BMI (Calculated): 53.7  Weight in (lb) to have BMI = 25: 149.9]  Wt Readings from Last 3 Encounters:   09/24/18 (!) 146 kg (321 lb 14 oz)   08/02/18 132.5 kg (292 lb)   05/25/18 132.5 kg (292 lb)     Temp Readings from Last 3 Encounters:   05/04/18 98 °F (36.7 °C) (Oral)   07/26/17 96.8 °F (36 °C) (Tympanic)   07/18/17 98.1 °F (36.7 °C) (Oral)     Height: 5' 5" (165.1 cm)          Physical Exam   Constitutional: She is oriented to person, place, and time. She appears well-developed and well-nourished.   HENT:   Head: Normocephalic.   Mouth/Throat: Mallampati Score: IV.   Neck: Neck supple.   Neck circumference 22 in   Cardiovascular: Normal rate and regular rhythm.   Pulmonary/Chest: Normal expansion and effort normal. She has decreased breath sounds.   Abdominal: Soft.   Musculoskeletal: She exhibits no deformity.   Lymphadenopathy:     She has no cervical adenopathy.   Neurological: She is alert and oriented to person, place, and time.   Skin: Skin is warm.   Psychiatric: She has a normal mood and affect.       Laboratory    Lab Results   Component Value Date    WBC 5.77 04/04/2017    HGB 11.2 (L) 04/04/2017    HCT 35.1 (L) 04/04/2017    MCV 84 04/04/2017     (H) 04/04/2017     BMP  Lab Results   Component Value Date     " 04/04/2017    K 4.2 04/04/2017     04/04/2017    CO2 23 04/04/2017    BUN 9 04/04/2017    CREATININE 0.8 04/04/2017    CALCIUM 8.6 (L) 04/04/2017    ANIONGAP 10 04/04/2017    ESTGFRAFRICA >60.0 04/04/2017    EGFRNONAA >60.0 04/04/2017     BNP  @LABRCNTIP(BNP,BNPTRIAGEBLO)@  Lab Results   Component Value Date    TSH 1.204 04/04/2017     ABG  @LABRCNTIP(PH,PO2,PCO2,HCO3,BE)@    Compliance Summary  8/25/2018 - 9/23/2018 (30 days)  Days with Device Usage 23 days  Days without Device Usage 7 days  Percent Days with Device Usage 76.7%  Cumulative Usage 5 days 5 hrs. 22 mins. 19 secs.  Maximum Usage (1 Day) 9 hrs. 45 mins. 6 secs.  Average Usage (All Days) 4 hrs. 10 mins. 44 secs.  Average Usage (Days Used) 5 hrs. 27 mins. 3 secs.  Minimum Usage (1 Day) 1 hrs. 37 mins. 41 secs.  Percent of Days with Usage >= 4 Hours 60.0%  Percent of Days with Usage < 4 Hours 40.0%  Date Range  Total Blower Time 5 days 12 hrs. 10 mins. 31 secs.  CPAP Summary  Average Time in Large Leak Per Day 13 mins. 34 secs.  Average AHI 7.9  CPAP 8.0 cmH2O  Printed      Polysomnography with CPAP titration April 2017    SUMMARY STATEMENTS:  1. Findings related to sleep diagnoses:  · Moderate snoring.  · Apnea hypopnea index was 103.3 events per hour total events 248.  · Oxygen saturations were below ? 88% for 55.1% of the time spent asleep  · Patient met split-night criteria. CPAP was initiated at 4.0cm, with a full face medium mask.  C-flex (set to 3) and heated humidification were used throughout.  · Optimal CPAP titration CPAP  2. EEG abnormalities:  · Sleep latency was significantly delayed patient was watching electronic devices  · REM latency was short.  Wake after sleep onset was very low.  No slow-wave sleep.  No alpha intrusion.  · The commencement of CPAP there was rebound REM sleep.  Sleep efficiency improved from 65.7% to 96.1%.  · Arousal index dropped from 100.0 events per hour to 7.9 events per hour.  · No periodic limb  movements  3. ECG abnormalities:  · Average heart rate was 70 bpm.     INTERPRETATION:      1. Very severe obstructive sleep apnea-hypopnea syndrome.  2. Morbid obesity based on BMI  3. Adequate CPAP titration.  CPAP 8 cm water pressure adequate  4. REM rebound phenomena with titration     RECOMMENDATIONS:      1. CPAP 8 cm water pressure with suitable mask  2. Weight loss exercise consider bariatric surgery.    CT chest July 2016 was negative for PE    CT chest [facility] April 2013 shows right upper lobe pulmonary emboli  Assessment/Plan:   JAKE on CPAP  -     CPAP FOR HOME USE    BMI 50.0-59.9, adult    History of DVT of lower extremity    History of pulmonary embolism    Advised instructed patient to improve compliance with CPAP.  DME supplies ordered.    Instructed her to use it ideally continuously during sleep with minimum of 4 hr a night 7 nights out of 10.    Patient to follow with nutritionist.    General weight loss/lifestyle modification strategies  (elicit support from others; identify saboteurs; non-food rewards).  Diet interventions: low calorie (1000 kCal/d) deficit diet    Continue Coumadin p.o..      Follow-up in about 6 weeks (around 11/5/2018).    This note was prepared using voice recognition system and is likely to have sound alike errors that may have been overlooked even after proof reading.  Please call me with any questions    Discussed diagnosis, its evaluation, treatment and usual course. All questions answered.    Thank you for the courtesy of participating in the care of this patient    Isabel Boles MD
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
## 2018-09-25 ENCOUNTER — CLINICAL SUPPORT (OUTPATIENT)
Dept: REHABILITATION | Facility: HOSPITAL | Age: 48
End: 2018-09-25
Attending: ANESTHESIOLOGY
Payer: MEDICAID

## 2018-09-25 DIAGNOSIS — M25.551 RIGHT HIP PAIN: Primary | ICD-10-CM

## 2018-09-25 DIAGNOSIS — M53.3 SACROILIAC JOINT DYSFUNCTION: ICD-10-CM

## 2018-09-25 PROCEDURE — 97140 MANUAL THERAPY 1/> REGIONS: CPT

## 2018-09-25 PROCEDURE — 97014 ELECTRIC STIMULATION THERAPY: CPT

## 2018-09-25 PROCEDURE — 97110 THERAPEUTIC EXERCISES: CPT

## 2018-09-27 ENCOUNTER — CLINICAL SUPPORT (OUTPATIENT)
Dept: REHABILITATION | Facility: HOSPITAL | Age: 48
End: 2018-09-27
Attending: ANESTHESIOLOGY
Payer: MEDICAID

## 2018-09-27 DIAGNOSIS — M25.552 LEFT HIP PAIN: ICD-10-CM

## 2018-09-27 DIAGNOSIS — G89.29 CHRONIC LEFT-SIDED LOW BACK PAIN WITHOUT SCIATICA: ICD-10-CM

## 2018-09-27 DIAGNOSIS — M53.3 SACROILIAC JOINT DYSFUNCTION: ICD-10-CM

## 2018-09-27 DIAGNOSIS — M25.552 HIP PAIN, CHRONIC, LEFT: ICD-10-CM

## 2018-09-27 DIAGNOSIS — M54.50 CHRONIC LEFT-SIDED LOW BACK PAIN WITHOUT SCIATICA: ICD-10-CM

## 2018-09-27 DIAGNOSIS — G89.29 HIP PAIN, CHRONIC, LEFT: ICD-10-CM

## 2018-09-27 DIAGNOSIS — M25.551 RIGHT HIP PAIN: Primary | ICD-10-CM

## 2018-09-27 PROCEDURE — 97140 MANUAL THERAPY 1/> REGIONS: CPT

## 2018-09-27 PROCEDURE — 97110 THERAPEUTIC EXERCISES: CPT

## 2018-09-27 PROCEDURE — 97014 ELECTRIC STIMULATION THERAPY: CPT
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
## 2018-11-07 ENCOUNTER — TELEPHONE (OUTPATIENT)
Dept: PULMONOLOGY | Facility: CLINIC | Age: 48
End: 2018-11-07

## 2018-11-16 ENCOUNTER — TELEPHONE (OUTPATIENT)
Dept: CARDIOLOGY | Facility: CLINIC | Age: 48
End: 2018-11-16

## 2018-11-16 NOTE — TELEPHONE ENCOUNTER
----- Message from Luz Farrar sent at 11/16/2018  8:49 AM CST -----  Contact: Pt  Pt request an appointment .....[phone] (mfbq)

## 2018-11-16 NOTE — TELEPHONE ENCOUNTER
Returned call. Patient stated need to see her cardiologist,( don't remember name of cardiologist) due to abn nuclear stress test at [facility], ASAP or earliest appt.    Patient was informed last visit was 03/2017 with Dr. Quiñones.    Informed Dr. Quiñones next availability 2 weeks away, offered earlier  appt with [facility], pt refused but will see another cardiologist.    Patient scheduled to see Dr. Ramírez 11/21/2018 at [facility].

## 2018-11-19 DIAGNOSIS — I10 ESSENTIAL HYPERTENSION: Primary | ICD-10-CM

## 2018-11-20 ENCOUNTER — OFFICE VISIT (OUTPATIENT)
Dept: CARDIOLOGY | Facility: CLINIC | Age: 48
End: 2018-11-20
Payer: MEDICAID

## 2018-11-20 ENCOUNTER — CLINICAL SUPPORT (OUTPATIENT)
Dept: CARDIOLOGY | Facility: CLINIC | Age: 48
End: 2018-11-20
Payer: MEDICAID

## 2018-11-20 VITALS
SYSTOLIC BLOOD PRESSURE: 138 MMHG | BODY MASS INDEX: 48.82 KG/M2 | HEART RATE: 97 BPM | WEIGHT: 293 LBS | HEIGHT: 65 IN | DIASTOLIC BLOOD PRESSURE: 102 MMHG

## 2018-11-20 DIAGNOSIS — R07.89 OTHER CHEST PAIN: ICD-10-CM

## 2018-11-20 DIAGNOSIS — E78.5 HYPERLIPIDEMIA WITH TARGET LDL LESS THAN 100: ICD-10-CM

## 2018-11-20 DIAGNOSIS — G47.33 OSA (OBSTRUCTIVE SLEEP APNEA): Chronic | ICD-10-CM

## 2018-11-20 DIAGNOSIS — E11.22 CKD STAGE 3 DUE TO TYPE 2 DIABETES MELLITUS: ICD-10-CM

## 2018-11-20 DIAGNOSIS — N18.30 CKD STAGE 3 DUE TO TYPE 2 DIABETES MELLITUS: ICD-10-CM

## 2018-11-20 DIAGNOSIS — R94.39 ABNORMAL CARDIOVASCULAR STRESS TEST: ICD-10-CM

## 2018-11-20 DIAGNOSIS — E11.69 HYPERLIPIDEMIA ASSOCIATED WITH TYPE 2 DIABETES MELLITUS: ICD-10-CM

## 2018-11-20 DIAGNOSIS — I10 ESSENTIAL HYPERTENSION: ICD-10-CM

## 2018-11-20 DIAGNOSIS — Z01.810 PREOP CARDIOVASCULAR EXAM: Primary | ICD-10-CM

## 2018-11-20 DIAGNOSIS — E66.01 MORBID OBESITY: ICD-10-CM

## 2018-11-20 DIAGNOSIS — E78.5 HYPERLIPIDEMIA ASSOCIATED WITH TYPE 2 DIABETES MELLITUS: ICD-10-CM

## 2018-11-20 PROCEDURE — 93005 ELECTROCARDIOGRAM TRACING: CPT | Mod: PBBFAC,PO | Performed by: INTERNAL MEDICINE

## 2018-11-20 PROCEDURE — 99215 OFFICE O/P EST HI 40 MIN: CPT | Mod: S$PBB,,, | Performed by: INTERNAL MEDICINE

## 2018-11-20 PROCEDURE — 93010 ELECTROCARDIOGRAM REPORT: CPT | Mod: S$PBB,,, | Performed by: INTERNAL MEDICINE

## 2018-11-20 PROCEDURE — 99213 OFFICE O/P EST LOW 20 MIN: CPT | Mod: PBBFAC,PO,25 | Performed by: INTERNAL MEDICINE

## 2018-11-20 PROCEDURE — 99999 PR PBB SHADOW E&M-EST. PATIENT-LVL III: CPT | Mod: PBBFAC,,, | Performed by: INTERNAL MEDICINE

## 2018-11-20 RX ORDER — GABAPENTIN 800 MG/1
800 TABLET ORAL 2 TIMES DAILY
COMMUNITY

## 2018-11-20 RX ORDER — METFORMIN HYDROCHLORIDE EXTENDED-RELEASE TABLETS 1000 MG/1
1000 TABLET, FILM COATED, EXTENDED RELEASE ORAL 2 TIMES DAILY WITH MEALS
Status: ON HOLD | COMMUNITY
End: 2018-11-26 | Stop reason: SDUPTHER

## 2018-11-20 RX ORDER — NITROGLYCERIN 0.4 MG/1
0.4 TABLET SUBLINGUAL EVERY 5 MIN PRN
Qty: 30 TABLET | Refills: 0 | Status: SHIPPED | OUTPATIENT
Start: 2018-11-20 | End: 2019-11-20

## 2018-11-20 RX ORDER — ATORVASTATIN CALCIUM 40 MG/1
80 TABLET, FILM COATED ORAL DAILY
Qty: 30 TABLET | Refills: 3 | Status: SHIPPED | OUTPATIENT
Start: 2018-11-20 | End: 2020-11-03

## 2018-11-20 NOTE — PATIENT INSTRUCTIONS
Having Cardiac Catheterization  You may have had chest pain (angina), dizziness, or other symptoms of heart trouble. To help diagnose your problem, your healthcare provider may advise a cardiac catheterization. This is a procedure that looks for a blockage or narrow area in the arteries around the heart. These can cause chest pain or a heart attack if not treated.  This common procedure may also be used to treat a heart problem. It may be done as a planned procedure if you have had chest pain in the past. Or it may be done right away to treat a suspected heart attack.     The catheter may be placed in the arm or the groin.   Before the procedure  · Tell your healthcare team what medicines you take and about any allergies you have.  · Dont eat or drink anything after midnight the night before the procedure, or as instructed by your healthcare team.  During the procedure  · Hair may be trimmed where the catheter will be inserted.  · You may be given medicine to relax before the procedure.  · You will receive a local anesthetic to prevent pain at the insertion site.  · A healthcare provider inserts a tube called a sheath into a blood vessel in your groin or arm.  · Through the sheath, a long, thin tube called a catheter is placed inside the artery. The catheter is then guided toward your heart.  · To do different tests or check other parts of the heart, the healthcare provider inserts a new catheter or moves the catheter or X-ray machine. For some tests, a contrast dye is injected through the catheter.  After the procedure  · Your healthcare providers will tell you how long to lie down and keep the insertion site still.  · If the insertion site was in your groin, you may need to lie down with your leg still for 2 or more hours. If a suture or closure device such as a collagen plug is used on the artery site to close the site, you may be able to move sooner. This depends on any bleeding that occurs.  · A nurse will  check the insertion site and your blood pressure.  · You may be asked to drink fluid to help flush the contrast liquid out of your system.  · Have someone drive you home from the hospital.  · Its normal to find a small bruise or lump at the insertion site. This should go away within a few weeks.  When to call your healthcare provider  Call your healthcare provider right away if you have any of the following:  · Chest pain (angina)  · Pain, swelling, redness, bleeding, or fluid leaking at the insertion site  · Severe pain, coldness, or a bluish color in the leg or arm that held the catheter  · Blood in your urine, black or sticky stools, or any other kind of bleeding  · Fever of 100.4°F (38.0°C) or higher, or as advised by your healthcare provider   Date Last Reviewed: 11/1/2016  © 8583-1949 [employer]. [address] [place] [address], [place] [address]. All rights reserved. This information is not intended as a substitute for professional medical care. Always follow your healthcare professional's instructions.
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
## 2018-11-26 ENCOUNTER — HOSPITAL ENCOUNTER (OUTPATIENT)
Facility: HOSPITAL | Age: 48
Discharge: HOME | End: 2018-11-26
Attending: INTERNAL MEDICINE | Admitting: INTERNAL MEDICINE
Payer: MEDICAID

## 2018-11-26 VITALS
WEIGHT: 293 LBS | TEMPERATURE: 98 F | BODY MASS INDEX: 48.82 KG/M2 | DIASTOLIC BLOOD PRESSURE: 68 MMHG | SYSTOLIC BLOOD PRESSURE: 112 MMHG | RESPIRATION RATE: 20 BRPM | HEIGHT: 65 IN | HEART RATE: 78 BPM | OXYGEN SATURATION: 94 %

## 2018-11-26 DIAGNOSIS — R94.39 ABNORMAL CARDIOVASCULAR STRESS TEST: Primary | ICD-10-CM

## 2018-11-26 DIAGNOSIS — R94.39 ABNORMAL STRESS TEST: ICD-10-CM

## 2018-11-26 DIAGNOSIS — R07.9 CHEST PAIN: ICD-10-CM

## 2018-11-26 DIAGNOSIS — R93.1 ABNORMAL NUCLEAR CARDIAC IMAGING TEST: ICD-10-CM

## 2018-11-26 DIAGNOSIS — Z79.01 LONG TERM (CURRENT) USE OF ANTICOAGULANTS: ICD-10-CM

## 2018-11-26 LAB
CORONARY STENOSIS: ABNORMAL
INR PPP: 1
PROTHROMBIN TIME: 10.9 SEC

## 2018-11-26 PROCEDURE — 25000003 PHARM REV CODE 250

## 2018-11-26 PROCEDURE — C1769 GUIDE WIRE: HCPCS

## 2018-11-26 PROCEDURE — 63600175 PHARM REV CODE 636 W HCPCS

## 2018-11-26 PROCEDURE — 25000003 PHARM REV CODE 250: Performed by: INTERNAL MEDICINE

## 2018-11-26 PROCEDURE — 93458 L HRT ARTERY/VENTRICLE ANGIO: CPT | Mod: 26,,, | Performed by: INTERNAL MEDICINE

## 2018-11-26 PROCEDURE — 99152 MOD SED SAME PHYS/QHP 5/>YRS: CPT | Mod: ,,, | Performed by: INTERNAL MEDICINE

## 2018-11-26 PROCEDURE — 93005 ELECTROCARDIOGRAM TRACING: CPT

## 2018-11-26 PROCEDURE — 99152 MOD SED SAME PHYS/QHP 5/>YRS: CPT

## 2018-11-26 PROCEDURE — 93458 L HRT ARTERY/VENTRICLE ANGIO: CPT

## 2018-11-26 PROCEDURE — 93010 ELECTROCARDIOGRAM REPORT: CPT | Mod: ,,, | Performed by: INTERNAL MEDICINE

## 2018-11-26 PROCEDURE — 85610 PROTHROMBIN TIME: CPT

## 2018-11-26 RX ORDER — DIPHENHYDRAMINE HCL 50 MG
50 CAPSULE ORAL ONCE
Status: COMPLETED | OUTPATIENT
Start: 2018-11-26 | End: 2018-11-26

## 2018-11-26 RX ORDER — NAPROXEN SODIUM 220 MG/1
81 TABLET, FILM COATED ORAL ONCE
Status: COMPLETED | OUTPATIENT
Start: 2018-11-26 | End: 2018-11-26

## 2018-11-26 RX ORDER — METFORMIN HYDROCHLORIDE EXTENDED-RELEASE TABLETS 1000 MG/1
1000 TABLET, FILM COATED, EXTENDED RELEASE ORAL 2 TIMES DAILY WITH MEALS
Qty: 60 TABLET | Refills: 6 | Status: SHIPPED | OUTPATIENT
Start: 2018-11-29

## 2018-11-26 RX ORDER — IBUPROFEN 400 MG/1
400 TABLET ORAL ONCE
Status: COMPLETED | OUTPATIENT
Start: 2018-11-26 | End: 2018-11-26

## 2018-11-26 RX ORDER — SODIUM CHLORIDE 9 MG/ML
INJECTION, SOLUTION INTRAVENOUS CONTINUOUS
Status: DISCONTINUED | OUTPATIENT
Start: 2018-11-26 | End: 2018-11-26 | Stop reason: HOSPADM

## 2018-11-26 RX ORDER — DIAZEPAM 5 MG/1
5 TABLET ORAL
Status: DISCONTINUED | OUTPATIENT
Start: 2018-11-26 | End: 2018-11-26 | Stop reason: HOSPADM

## 2018-11-26 RX ADMIN — NAPROXEN SODIUM 81 MG: 220 TABLET, FILM COATED ORAL at 02:11

## 2018-11-26 RX ADMIN — DIAZEPAM 5 MG: 5 TABLET ORAL at 02:11

## 2018-11-26 RX ADMIN — IBUPROFEN 400 MG: 400 TABLET, FILM COATED ORAL at 05:11

## 2018-11-26 RX ADMIN — Medication 50 MG: at 02:11

## 2018-11-26 RX ADMIN — SODIUM CHLORIDE: 9 INJECTION, SOLUTION INTRAVENOUS at 02:11

## 2018-11-26 NOTE — INTERVAL H&P NOTE
The patient has been examined and the H&P has been reviewed:    I concur with the findings and no changes have occurred since H&P was written.    Anesthesia/Surgery risks, benefits and alternative options discussed and understood by patient/family.  I have explained the risks, benefits , and alternatives of the procedure in detail.the patient voices understanding and all questions have been answered.the patient agrees to proceed as planned.          Active Hospital Problems    Diagnosis  POA    Abnormal nuclear cardiac imaging test [R93.1]  Yes     Priority: High      Resolved Hospital Problems   No resolved problems to display.

## 2018-11-26 NOTE — PLAN OF CARE
1500 PT HERE FOR [facility], REFERRED BY DR. KOEHLER. DAUGHTER AND SPOUSE, CISCO, PRESENT FOR PROCEDURE, [phone].

## 2018-11-26 NOTE — BRIEF OP NOTE
Date: 11/26/2018  Surgeon/Physician: Rehana Quiñones MD  Assistants: none    Pre Op Diagnosis:abnormal cardiolite    Post OP Diagnosis: abnormal cardiolite CAD    Procedure Performed: [facility]    ANESTHESIA:RN IV SEDATION    COMPLICATION: NONE    Specimen / Tissue Removed: No    Estimated Blood Loss: <50 cc    Prostetics/Devices: None    Findings / Operative Note:   AOICAL LAD 50% D3 SMALL 80% OSTIAL. NON OBS LAD   OM1 40%   RCA NON OBS DISEASE.  LVF NORMAL.      PLAN:  REASSURE   MEDICAL; THERAPY   OK TO PROCEED WITH SURGERY.      Discharge Note  Short Stay      SUMMARY     Admit Date: 11/26/2018    Attending Physician: Luzmaria Quiñones MD     Discharge Physician: Rehana Quiñones MD     Discharge Date: 11/26/2018     Final Diagnosis: ABNORMAL NUCLEAR STRESS TEST.  NON OBS CAD.    Outcome of Stay:PATIENT TOLERATED PROCEDURE WELL HAS DISTAL APICAL LAD DISEASE WITH A TIGHT SMALL DIAGONAL AS WELL NON OBS RCA AND OM1 DISEASE WITH NORMAL LVF.SHE HAS TRANSIENT CHEST PAIN TREATED WITH NITRATES B BLCOKERS AND GI COCKTAIL. SHE WAS DEEMED STABLE FOR DISCHARGE TO HAVE HER KNEE REPLACED.AFTER HER KNEE REPLACEMENT SHE WILL BENEFIT FROM ANTIPLATELET THERAPY AS WELL AS STATINS AND TIGHT DIABETES CONTROL.  SHE WILL RESUME COUMADIN AFTER HER KNEE REPLACEMENT PER SURGICAL PROTOCOL. [facility] WILL FOLLOW UP WITH DR KOEHLER    Disposition: Home or Self Care    Patient Instructions:   Current Discharge Medication List      CONTINUE these medications which have CHANGED    Details   metFORMIN (FORTAMET) 1,000 mg 24 hr tablet Take 1 tablet (1,000 mg total) by mouth 2 (two) times daily with meals.  Qty: 60 tablet, Refills: 6         CONTINUE these medications which have NOT CHANGED    Details   atorvastatin (LIPITOR) 40 MG tablet Take 2 tablets (80 mg total) by mouth once daily.  Qty: 30 tablet, Refills: 3      blood sugar diagnostic Strp Use to check blood sugars twice daily.  Qty: 100 strip, Refills: 4    Comments: Patient uses ONE TOUCH ULTRA  "BLUE  Associated Diagnoses: Diabetes mellitus type 2 in obese      blood-glucose meter (ONETOUCH VERIO IQ METER) kit Use as instructed  Qty: 1 each, Refills: 0      fluticasone (FLONASE) 50 mcg/actuation nasal spray 1 spray (50 mcg total) by Each Nare route once daily.  Qty: 1 Bottle, Refills: 0      lancets Misc Use to check blood sugars twice daily.  Qty: 100 each, Refills: 4    Associated Diagnoses: Diabetes mellitus type 2 in obese      mirtazapine (REMERON) 30 MG tablet Take 30 mg by mouth every evening.       pantoprazole (PROTONIX) 40 MG tablet Take 1 tablet (40 mg total) by mouth once daily.  Qty: 30 tablet, Refills: 3      pen needle, diabetic 31 gauge x 1/4" Ndle USE ONE  4 TIMES DAILY WITH MEALS AND NIGHTLY  Qty: 150 each, Refills: 0      DOCUSATE CALCIUM (STOOL SOFTENER ORAL) Take 2 tablets by mouth as needed.       gabapentin (NEURONTIN) 800 MG tablet Take 800 mg by mouth 2 (two) times daily.      insulin glargine (LANTUS SOLOSTAR) 100 unit/mL (3 mL) InPn pen Inject 30 Units into the skin every evening.  Qty: 2 Box, Refills: 1      levothyroxine (SYNTHROID) 75 MCG tablet Take 1 tablet (75 mcg total) by mouth once daily. Avoid calcium, iron, food, or fiber within 1 hour of taking medication.  Qty: 90 tablet, Refills: 2    Associated Diagnoses: Hypothyroidism due to acquired atrophy of thyroid      lidocaine (LIDODERM) 5 % APPLY 1 PATCH FOR 12 HOURS THEN 12 HOURS OFF PRN P  Refills: 2      loratadine (CLARITIN) 10 mg tablet Take 1 tablet (10 mg total) by mouth once daily.  Refills: 0      nitroGLYCERIN (NITROSTAT) 0.4 MG SL tablet Place 1 tablet (0.4 mg total) under the tongue every 5 (five) minutes as needed.  Qty: 30 tablet, Refills: 0    Associated Diagnoses: Other chest pain         STOP taking these medications       enoxaparin sodium (LOVENOX SUBQ) Comments:   Reason for Stopping:         warfarin (COUMADIN) 10 MG tablet Comments:   Reason for Stopping:               Discharge Procedure Orders (must " include Diet, Follow-up, Activity)   Discharge Procedure Orders (must include Diet, Follow-up, Activity)   Diet general     Call MD for:  temperature >100.4     Call MD for:  persistent nausea and vomiting     Call MD for:  severe uncontrolled pain     Call MD for:  difficulty breathing, headache or visual disturbances     Call MD for:  redness, tenderness, or signs of infection (pain, swelling, redness, odor or green/yellow discharge around incision site)     Call MD for:  hives     Call MD for:  persistent dizziness or light-headedness     Call MD for:  extreme fatigue     Follow-up Information     Bret Ramírez Md, MD In 2 weeks.    Specialties:  Cardiology, Internal Medicine  Contact information:  [address]  [place] [address] [phone]

## 2018-11-27 NOTE — DISCHARGE INSTRUCTIONS
"Post-op Heart Catheterization    1. DIET: It is advisable for you to follow a diet that limits the intake of salt, sugar, saturated fats and cholesterol.     2. DRIVING: Due to sedation you received during your procedure, DO NOT drive or operate machinery for 24 hours. Avoid making critical decisions or signing legal documents until tomorrow.    3. ACTIVITY: AVOID activities that require bending of the affected arm/wrist for 3 days and submerging the site in water for 3 days. REMOVE the dressing the day after  the procedure AND SHOWER.  APPLY BANDAID TO SITE AFTER SHOWER. WEAR WRIST IMMOBILIZER TODAY AND TOMORROW. You may RESUME  your normal activities or prescribed exercise program as instructed by your physician after 3 days.                                                                                                                 4. WOUND CARE: It is not unusual to have a small amount of bruising to appear at or near the puncture site. It is also common to have a tender "knot" develop beneath the skin at the puncture site of the wrist/arm. This is usually scar tissue and is not a cause for concern or alarm. This tender knot may take several weeks to fully resolve. The bruise will usually spread over several days. However, if the lump gets bigger, call your doctor immediately.    5. DISCOMFORT: For general discomfort at the puncture site, you may take 1 or 2 Acetaminophen (Tylenol) tablets every 4 - 6 hours as needed. (Do not take more than 4000 mg a day)    6. SIGNS AND SYMPTOMS TO REPORT:  Call your physician immediately if any of the following occur:                                            1. Loss of feeling, warmth or color to the affected arm/wrist                                                                                                          2. Mild beeding from the site                 3. Pain that is sudden, sharp or persistent in the affected arm/wrist                 4. Swelling or a " "change in "lump" size, increased redness or drainage at  the puncture site                                                                               5. High fever (101 degrees or higher)    7. GO TO  THE EMERGENCY ROOM OR CALL 911 IF YOU HAVE: Chest pains or discomforts not relieved with 3 nitroglycerin doses (sublingual tablets or spray), numbness or severe pain of limb, if your limb becomes cold or discolored or if you develop uncontrolled bleeding from the puncture site (quickly apply firm, direct pressure above the site).    "

## 2018-11-27 NOTE — PLAN OF CARE
1845 awake and oriented. Discharge instructions reviewed and copy given.  1846 IV REMOVED.1855 DISCHARGED HOME. TO EXIT VIA W/C

## 2018-12-05 ENCOUNTER — OFFICE VISIT (OUTPATIENT)
Dept: CARDIOLOGY | Facility: CLINIC | Age: 48
End: 2018-12-05
Payer: MEDICAID

## 2018-12-05 VITALS
HEIGHT: 65 IN | BODY MASS INDEX: 48.82 KG/M2 | DIASTOLIC BLOOD PRESSURE: 92 MMHG | SYSTOLIC BLOOD PRESSURE: 148 MMHG | WEIGHT: 293 LBS | HEART RATE: 88 BPM

## 2018-12-05 DIAGNOSIS — M79.89 LEFT ARM SWELLING: Primary | ICD-10-CM

## 2018-12-05 DIAGNOSIS — G47.33 OSA (OBSTRUCTIVE SLEEP APNEA): Chronic | ICD-10-CM

## 2018-12-05 DIAGNOSIS — N18.30 CKD STAGE 3 DUE TO TYPE 2 DIABETES MELLITUS: ICD-10-CM

## 2018-12-05 DIAGNOSIS — E11.22 CKD STAGE 3 DUE TO TYPE 2 DIABETES MELLITUS: ICD-10-CM

## 2018-12-05 DIAGNOSIS — Z01.818 PRE-OP EXAMINATION: ICD-10-CM

## 2018-12-05 PROCEDURE — 99214 OFFICE O/P EST MOD 30 MIN: CPT | Mod: S$PBB,,, | Performed by: PHYSICIAN ASSISTANT

## 2018-12-05 PROCEDURE — 99999 PR PBB SHADOW E&M-EST. PATIENT-LVL IV: CPT | Mod: PBBFAC,,, | Performed by: PHYSICIAN ASSISTANT

## 2018-12-05 PROCEDURE — 99214 OFFICE O/P EST MOD 30 MIN: CPT | Mod: PBBFAC,PO | Performed by: PHYSICIAN ASSISTANT

## 2018-12-05 RX ORDER — TRAMADOL HYDROCHLORIDE 50 MG/1
50 TABLET ORAL
Refills: 0 | COMMUNITY
Start: 2018-09-20

## 2018-12-05 RX ORDER — ESCITALOPRAM OXALATE 20 MG/1
20 TABLET ORAL DAILY
COMMUNITY
Start: 2018-11-15

## 2018-12-05 RX ORDER — ENOXAPARIN SODIUM 150 MG/ML
139.5 INJECTION SUBCUTANEOUS
COMMUNITY
Start: 2018-11-28 | End: 2018-12-05

## 2018-12-05 RX ORDER — TIZANIDINE HYDROCHLORIDE 4 MG/1
CAPSULE, GELATIN COATED ORAL
Refills: 2 | COMMUNITY
Start: 2018-11-16 | End: 2018-12-05

## 2018-12-05 RX ORDER — CYCLOBENZAPRINE HCL 10 MG
10 TABLET ORAL 3 TIMES DAILY PRN
COMMUNITY

## 2018-12-05 NOTE — PROGRESS NOTES
"Subjective:    Patient ID:  Clarissa Turner is a 48 y.o. female who presents for follow-up of Hospital Follow Up      HPI   Ms. Turner is a 48 year old female patient with a PMHx of HTN, hyperlipidemia, DM type II, obesity, CHASE, and recurrent DVT/PE (on Coumadin, currently on Lovenox for bridging) who presents today for hospital follow-up. Patient recently underwent LHC by Dr. Quiñones on 11/26/18 due to abnormal stress test. LHC subsequently showed non-obstructive CAD, medical management recommended. She returns today and complains of left wrist and hand pain s/p procedure. Associated with swelling. Concerned it could be a "blood clot" as she is currently being bridged with Lovenox. No other complaints. Denies chest pain or SOB. No lightheadedness, dizziness, near syncope, or syncope. BP elevated but patient was just recently startedon a BP medication, but cannot remember the name.     Review of Systems   Constitution: Negative for chills, decreased appetite, fever, weakness and malaise/fatigue.   HENT: Negative for congestion, hoarse voice and sore throat.    Eyes: Negative for blurred vision and discharge.   Cardiovascular: Negative for chest pain, claudication, cyanosis, dyspnea on exertion, irregular heartbeat, leg swelling, near-syncope, orthopnea, palpitations and paroxysmal nocturnal dyspnea.   Respiratory: Negative for cough, hemoptysis, shortness of breath, snoring, sputum production and wheezing.    Endocrine: Negative for cold intolerance and heat intolerance.   Hematologic/Lymphatic: Negative for bleeding problem. Does not bruise/bleed easily.   Skin: Negative for rash.   Musculoskeletal: Positive for joint pain. Negative for arthritis, back pain, joint swelling, muscle cramps, muscle weakness and myalgias.        Left hand swelling     Gastrointestinal: Negative for abdominal pain, constipation, diarrhea, heartburn, melena and nausea.   Genitourinary: Negative for hematuria.   Neurological: " "Negative for dizziness, focal weakness, headaches, light-headedness, loss of balance, numbness, paresthesias and seizures.   Psychiatric/Behavioral: Negative for memory loss. The patient does not have insomnia.    Allergic/Immunologic: Negative for hives.     BP (!) 148/92 (BP Location: Right arm, Patient Position: Sitting, BP Method: Large (Manual))   Pulse 88   Ht 5' 5" (1.651 m)   Wt (!) 138.2 kg (304 lb 10.8 oz)   LMP 04/14/2015 (Exact Date)   BMI 50.70 kg/m²     Objective:    Physical Exam   Constitutional: She is oriented to person, place, and time. She appears well-developed and well-nourished. No distress.   HENT:   Head: Normocephalic and atraumatic.   Eyes: Pupils are equal, round, and reactive to light. Right eye exhibits no discharge. Left eye exhibits no discharge.   Neck: Neck supple. No JVD present. No thyromegaly present.   Cardiovascular: Normal rate, regular rhythm, S1 normal, S2 normal and normal heart sounds.   No murmur heard.  Pulmonary/Chest: Effort normal and breath sounds normal. No respiratory distress. She has no wheezes. She has no rales.   Abdominal: Soft. She exhibits no distension. There is no rebound.   Musculoskeletal: She exhibits no edema.   Neurological: She is alert and oriented to person, place, and time.   Skin: Skin is warm and dry. She is not diaphoretic. No erythema.   Left radial access site with mild bruising and tenderness; questionable small pseudoaneurysm    Psychiatric: She has a normal mood and affect. Her behavior is normal. Thought content normal.   Nursing note and vitals reviewed.      Diagnostic:          Patient has a right dominant coronary artery.        - Left Main Coronary Artery:             The LM is normal. There is MONI 3 flow.     - Left Anterior Descending Artery:             The distal LAD has a 50% stenosis. There is MONI 3 flow. The remaining portion of the vessel has luminal irregularities (10). the lesion is in the apical lad. the third small " diagonal has an ostial 80% .     - Left Circumflex Artery:             The LCX has luminal irregularities. There is MONI 3 flow. the om1 has a 30% stenosis.      - Right Coronary Artery:             The RCA has luminal irregularities. There is MONI 3 flow.     - Radial Artery:             The Radial artery.    Assessment:       1. Left arm swelling    2. JAKE (obstructive sleep apnea)    3. CKD stage 3 due to type 2 diabetes mellitus    4. BMI 50.0-59.9, adult    5. Pre-op examination      Presents for f/u. Stable CV wise however with left hand and wrist swelling s/p procedure. I had Dr. Quiñones evaluate patient as well, questionable small pseudoaneurysm. Will check arterial U/S tmw for further evaluation. Offered patient appt at hospital as OP this afternoon, she declined.   Plan:   -LUE arterial U/S; phone review  -Continue same meds  -ED if symptoms worsen  -Follow-up TBA    Chart reviewed. Dr. Quiñones agrees with plan as outlined above.

## 2018-12-06 ENCOUNTER — OFFICE VISIT (OUTPATIENT)
Dept: PULMONOLOGY | Facility: CLINIC | Age: 48
End: 2018-12-06
Payer: MEDICAID

## 2018-12-06 VITALS
HEIGHT: 65 IN | HEART RATE: 80 BPM | BODY MASS INDEX: 48.82 KG/M2 | RESPIRATION RATE: 17 BRPM | WEIGHT: 293 LBS | DIASTOLIC BLOOD PRESSURE: 80 MMHG | SYSTOLIC BLOOD PRESSURE: 136 MMHG | OXYGEN SATURATION: 95 %

## 2018-12-06 DIAGNOSIS — Z86.711 HISTORY OF PULMONARY EMBOLISM: ICD-10-CM

## 2018-12-06 DIAGNOSIS — G47.33 OSA (OBSTRUCTIVE SLEEP APNEA): Primary | ICD-10-CM

## 2018-12-06 DIAGNOSIS — G47.33 OSA ON CPAP: ICD-10-CM

## 2018-12-06 PROCEDURE — 99999 PR PBB SHADOW E&M-EST. PATIENT-LVL V: CPT | Mod: PBBFAC,,, | Performed by: NURSE PRACTITIONER

## 2018-12-06 PROCEDURE — 99213 OFFICE O/P EST LOW 20 MIN: CPT | Mod: S$PBB,,, | Performed by: NURSE PRACTITIONER

## 2018-12-06 PROCEDURE — 99215 OFFICE O/P EST HI 40 MIN: CPT | Mod: PBBFAC,PO | Performed by: NURSE PRACTITIONER

## 2018-12-06 RX ORDER — METOPROLOL TARTRATE AND HYDROCHLOROTHIAZIDE 100; 25 MG/1; MG/1
1 TABLET ORAL DAILY
COMMUNITY

## 2018-12-06 NOTE — PROGRESS NOTES
"Subjective:      Patient ID: Clarissa Turner is a 48 y.o. female.    Chief Complaint: Sleep Apnea    HPI  Presents to office for review of CPAP therapy. Patient states improved symptoms with use of CPAP. Sleeping more soundly. Waking up feeling more refreshed. Improved daytime sleepiness. Patient states she is benefiting from use of the CPAP. She has insomnia secondary to pain.   In bed at 7pm and watches TV. Asleep at 10pm, up at 6 am or later.       /80   Pulse 80   Resp 17   Ht 5' 5" (1.651 m)   Wt (!) 138.9 kg (306 lb 3.5 oz)   LMP 04/14/2015 (Exact Date)   SpO2 95%   BMI 50.96 kg/m²   Body mass index is 50.96 kg/m².    Review of Systems   Musculoskeletal: Positive for arthralgias.   Psychiatric/Behavioral: Positive for sleep disturbance.   All other systems reviewed and are negative.    Objective:      Physical Exam   Constitutional: She is oriented to person, place, and time. She appears well-developed and well-nourished.   Obese   HENT:   Head: Normocephalic and atraumatic.   Neck: Normal range of motion. Neck supple.   Cardiovascular: Normal rate and regular rhythm.   Pulmonary/Chest: Effort normal and breath sounds normal.   Abdominal: Soft.   Musculoskeletal: Normal range of motion. She exhibits no edema.   Neurological: She is alert and oriented to person, place, and time.   Skin: Skin is warm and dry.   Psychiatric: She has a normal mood and affect.     Personal Diagnostic Review  CPAP download shows patient wears on average 7 hrs and 56 minutes. Greater than 4 hrs 96.7 % of the time. AHI 6    Assessment:       1. JAKE (obstructive sleep apnea)    2. JAKE on CPAP    3. BMI 50.0-59.9, adult    4. History of pulmonary embolism        Outpatient Encounter Medications as of 12/6/2018   Medication Sig Dispense Refill    atorvastatin (LIPITOR) 40 MG tablet Take 2 tablets (80 mg total) by mouth once daily. 30 tablet 3    blood sugar diagnostic Strp Use to check blood sugars twice daily. 100 " strip 4    blood-glucose meter (ONETOUCH VERIO IQ METER) kit Use as instructed 1 each 0    cyclobenzaprine (FLEXERIL) 10 MG tablet Take 10 mg by mouth 3 (three) times daily as needed.      DOCUSATE CALCIUM (STOOL SOFTENER ORAL) Take 2 tablets by mouth as needed.       escitalopram oxalate (LEXAPRO) 20 MG tablet Take 20 mg by mouth once daily.      fluticasone (FLONASE) 50 mcg/actuation nasal spray 1 spray (50 mcg total) by Each Nare route once daily. 1 Bottle 0    gabapentin (NEURONTIN) 800 MG tablet Take 800 mg by mouth 2 (two) times daily.      insulin detemir U-100 (LEVEMIR FLEXTOUCH) 100 unit/mL (3 mL) SubQ InPn pen Inject 34 Units into the skin every evening.      insulin glargine (LANTUS SOLOSTAR) 100 unit/mL (3 mL) InPn pen Inject 30 Units into the skin every evening. 2 Box 1    lancets Misc Use to check blood sugars twice daily. 100 each 4    levothyroxine (SYNTHROID) 75 MCG tablet Take 1 tablet (75 mcg total) by mouth once daily. Avoid calcium, iron, food, or fiber within 1 hour of taking medication. 90 tablet 2    lidocaine (LIDODERM) 5 % APPLY 1 PATCH FOR 12 HOURS THEN 12 HOURS OFF PRN P  2    liraglutide 0.6 mg/0.1 mL, 18 mg/3 mL, subq PNIJ (VICTOZA 3-NICOLE) 0.6 mg/0.1 mL (18 mg/3 mL) PnIj Inject 0.6 mg into the skin.      loratadine (CLARITIN) 10 mg tablet Take 1 tablet (10 mg total) by mouth once daily.  0    metFORMIN (FORTAMET) 1,000 mg 24 hr tablet Take 1 tablet (1,000 mg total) by mouth 2 (two) times daily with meals. 60 tablet 6    metoprolol ta-hydrochlorothiaz (LOPRESSOR HCT) 100-25 mg per tablet Take 1 tablet by mouth once daily.      mirtazapine (REMERON) 30 MG tablet Take 30 mg by mouth every evening.       nitroGLYCERIN (NITROSTAT) 0.4 MG SL tablet Place 1 tablet (0.4 mg total) under the tongue every 5 (five) minutes as needed. 30 tablet 0    pantoprazole (PROTONIX) 40 MG tablet Take 1 tablet (40 mg total) by mouth once daily. 30 tablet 3    pen needle, diabetic 31 gauge x  "1/" Ndle USE ONE  4 TIMES DAILY WITH MEALS AND NIGHTLY 150 each 0    ranitidine (ZANTAC) 150 MG capsule Take 150 mg by mouth every evening.      traMADol (ULTRAM) 50 mg tablet Take 50 mg by mouth as needed.  0    [DISCONTINUED] enoxaparin sodium (LOVENOX SUBQ) Inject into the skin.      [DISCONTINUED] metFORMIN (FORTAMET) 1,000 mg 24 hr tablet Take 1,000 mg by mouth 2 (two) times daily with meals.      [DISCONTINUED] warfarin (COUMADIN) 10 MG tablet TAKE 2 TABLETS BY MOUTH DAILY EXCEPT 1 TABLET ON MONDAY, , WEDNESDAY AS DIRECTED BY THE COUMADIN CLINIC DISCONTINUE 6 MG TABLET (Patient taking differently: TAKE 1 TABLET BY MOUTH ON WEDNESDAY AND   AND ONE AND ONE-HALF TABELT ALL OTHER DAYSAS DIRECTED) 44 tablet 5    [] aspirin chewable tablet 81 mg       [] diphenhydrAMINE capsule 50 mg       [] ibuprofen tablet 400 mg       [DISCONTINUED] 0.9%  NaCl infusion       [DISCONTINUED] diazePAM tablet 5 mg       [DISCONTINUED] hyaluronate 16 mg/2 mL injection 16 mg       [DISCONTINUED] hylan g-f 20 48 mg/6 mL injection 48 mg       [DISCONTINUED] phytonadione (vitamin K1) tablet 5 mg        No facility-administered encounter medications on file as of 2018.      Orders Placed This Encounter   Procedures    CPAP/BIPAP SUPPLIES     Order Specific Question:   Type of mask:     Answer:   Nasal     Order Specific Question:   Headgear?     Answer:   Yes     Order Specific Question:   Tubing?     Answer:   Yes     Order Specific Question:   Humidifier chamber?     Answer:   Yes     Order Specific Question:   Chin strap?     Answer:   Yes     Order Specific Question:   Filters?     Answer:   Yes     Order Specific Question:   Length of need (1-99 months):     Answer:   99    CPAP/BIPAP SUPPLIES     Order Specific Question:   Type of mask:     Answer:   FFM     Order Specific Question:   Headgear?     Answer:   Yes     Order Specific Question:   Tubing?     Answer:   Yes     " Order Specific Question:   Humidifier chamber?     Answer:   Yes     Order Specific Question:   Chin strap?     Answer:   Yes     Order Specific Question:   Filters?     Answer:   Yes     Order Specific Question:   Length of need (1-99 months):     Answer:   99     Plan:      Doing well on PAP settings. Patient is compliant. Follow up in 12 months with PAP data download or call earlier if any problems.

## 2018-12-06 NOTE — Clinical Note
Did you intend to order supplies instead of new machine on visit in sept?  I am getting her set up with supplies today. Currently on machine from 2017

## 2018-12-12 ENCOUNTER — TELEPHONE (OUTPATIENT)
Dept: CARDIOLOGY | Facility: HOSPITAL | Age: 48
End: 2018-12-12

## 2018-12-12 ENCOUNTER — TELEPHONE (OUTPATIENT)
Dept: CARDIOLOGY | Facility: CLINIC | Age: 48
End: 2018-12-12

## 2018-12-12 NOTE — TELEPHONE ENCOUNTER
----- Message from Kayley Medina sent at 12/12/2018  9:53 AM CST -----  Contact: PT   Pt states that she is in the hospital due to chest pain and need the nurse to call her.   [phone] (home)

## 2019-01-28 ENCOUNTER — TELEPHONE (OUTPATIENT)
Dept: PULMONOLOGY | Facility: CLINIC | Age: 49
End: 2019-01-28

## 2019-02-11 ENCOUNTER — TELEPHONE (OUTPATIENT)
Dept: RADIOLOGY | Facility: HOSPITAL | Age: 49
End: 2019-02-11

## 2019-02-13 ENCOUNTER — TELEPHONE (OUTPATIENT)
Dept: PAIN MEDICINE | Facility: CLINIC | Age: 49
End: 2019-02-13

## 2019-02-13 NOTE — TELEPHONE ENCOUNTER
----- Message from Parvin Basurto sent at 2/13/2019 10:55 AM CST -----  Contact: pt  Type:  Sooner Apoointment Request    Caller is requesting a sooner appointment.  Caller declined first available appointment listed below.  Caller will not accept being placed on the waitlist and is requesting a message be sent to doctor.  Name of Caller:tanmay seo  When is the first available appointment? n/a  Symptoms:pain in hip and knee  Would the patient rather a call back or a response via [address]? Call back   Best Call Back Number:[phone] (home)     Additional Information: .

## 2019-02-13 NOTE — TELEPHONE ENCOUNTER
Contacted pt to offer earlier appt. Pt requesting to set up pain management appointment as she is s/p  REANEURYSM/GRFT INS,RADIAL/ULNAR    REPAIR ANEURYSM UPPER EXTREMITY ARTERY     12/7/18      Pt is looking for something to manage pain while is doing PT and recovering. Informed pt that  and Dr. Padilla is an INTERVENTIONAL pain specialist meaning they do procedures for pain management. Pt states she will contact  That referred her to verify as she was under the undestanding that he will be getting refills and medical pain management. All questions answered.//lp

## 2019-10-23 ENCOUNTER — OFFICE VISIT (OUTPATIENT)
Dept: OPHTHALMOLOGY | Facility: CLINIC | Age: 49
End: 2019-10-23
Payer: MEDICAID

## 2019-10-23 DIAGNOSIS — Z46.0 ENCOUNTER FOR FITTING OR ADJUSTMENT OF SPECTACLES OR CONTACT LENSES: ICD-10-CM

## 2019-10-23 DIAGNOSIS — H52.4 BILATERAL PRESBYOPIA: ICD-10-CM

## 2019-10-23 DIAGNOSIS — E11.9 DIABETES MELLITUS TYPE 2 WITHOUT RETINOPATHY: Primary | ICD-10-CM

## 2019-10-23 DIAGNOSIS — H52.203 MYOPIA WITH ASTIGMATISM AND PRESBYOPIA, BILATERAL: ICD-10-CM

## 2019-10-23 DIAGNOSIS — H52.4 MYOPIA WITH ASTIGMATISM AND PRESBYOPIA, BILATERAL: ICD-10-CM

## 2019-10-23 DIAGNOSIS — H52.13 MYOPIA WITH ASTIGMATISM AND PRESBYOPIA, BILATERAL: ICD-10-CM

## 2019-10-23 PROCEDURE — 99999 PR PBB SHADOW E&M-EST. PATIENT-LVL I: ICD-10-PCS | Mod: PBBFAC,,, | Performed by: OPTOMETRIST

## 2019-10-23 PROCEDURE — 92015 DETERMINE REFRACTIVE STATE: CPT | Mod: ,,, | Performed by: OPTOMETRIST

## 2019-10-23 PROCEDURE — 99999 PR PBB SHADOW E&M-EST. PATIENT-LVL I: CPT | Mod: PBBFAC,,, | Performed by: OPTOMETRIST

## 2019-10-23 PROCEDURE — 99211 OFF/OP EST MAY X REQ PHY/QHP: CPT | Mod: PBBFAC | Performed by: OPTOMETRIST

## 2019-10-23 PROCEDURE — 92014 PR EYE EXAM, EST PATIENT,COMPREHESV: ICD-10-PCS | Mod: S$PBB,,, | Performed by: OPTOMETRIST

## 2019-10-23 PROCEDURE — 92015 PR REFRACTION: ICD-10-PCS | Mod: ,,, | Performed by: OPTOMETRIST

## 2019-10-23 PROCEDURE — 92014 COMPRE OPH EXAM EST PT 1/>: CPT | Mod: S$PBB,,, | Performed by: OPTOMETRIST

## 2019-10-23 NOTE — PROGRESS NOTES
HPI     Last TRF exam 08/20/2018  Diabetic/NIDDM  Screening for glaucoma  RE  Update CL Rx  Decreased distance and near   Using OTC Readers +1.50    Last edited by Fermin Medina MA on 10/23/2019 10:21 AM. (History)            Assessment /Plan     For exam results, see Encounter Report.    Diabetes mellitus type 2 without retinopathy    Encounter for fitting or adjustment of spectacles or contact lenses    Myopia with astigmatism and presbyopia, bilateral    Bilateral presbyopia      No Background Diabetic Retinopathy     Discussed CL charges.  Dispense Final Rx for glasses  Note changes CL Rx  RTC 1 year  Discussed above and answered questions.

## 2020-01-07 NOTE — PROGRESS NOTES
Subjective:       Clarissa Turner is a 47 y.o. female   Follow-up appointment to review sleep study.  Patient has severe sleep apnea  obstructive on testing  She has not still received her machine  Goals of CPAP on importance of adherence discussed  Surgical weight loss also may be considered  She will follow-up in 6-8 weeks  Mortality and morbidity associated with untreated sleep apnea is discussed  I have reviewed the patient's medical history in detail and updated the computerized patient record.      Previous Report(s) Reviewed: historical medical records and office notes     The following portions of the patient's history were reviewed and updated as appropriate:   She  has a past medical history of Abnormal Pap smear of cervix; Abnormal Pap smear of vagina; Anticoagulant long-term use; Arthritis; Blood transfusion; Clotting disorder (2005 and 2009); Deep vein thrombosis; Diabetes mellitus, type II; General anesthetics causing adverse effect in therapeutic use; Hyperlipidemia; Hypertension; CHASE (iron deficiency anemia) (4/15/2015); PONV (postoperative nausea and vomiting); Pulmonary embolus; and Thyroid disease.  She  does not have any pertinent problems on file.  She  has a past surgical history that includes Joint replacement (2003); Cholecystectomy (2010); Hernia repair; Umbilical hernia repair; Total hip arthroplasty (Bilateral); Hysteroscopy; Dilation and curettage of uterus (3/2015); Knee arthroscopy (Left, 06/30/2016); and Hysterectomy (4/2015).  Her family history includes Diabetes in her father and mother; Heart disease in her maternal grandmother and mother; Hyperlipidemia in her mother; Hypertension in her father and mother; Osteoarthritis in her maternal grandmother; Rheumatologic disease in her maternal grandmother.  She  reports that she has never smoked. She has never used smokeless tobacco. She reports that she drinks alcohol. She reports that she does not use drugs.  She has a current  medication list which includes the following prescription(s): atorvastatin, blood sugar diagnostic, blood-glucose meter, docusate calcium, fluticasone, gabapentin, glipizide, humalog kwikpen, insulin glargine, lancets, levothyroxine, lidocaine, loratadine, losartan, metformin, pantoprazole, pen needle, trazodone, and warfarin.  Current Outpatient Prescriptions on File Prior to Visit   Medication Sig Dispense Refill    atorvastatin (LIPITOR) 80 MG tablet TAKE ONE TABLET BY MOUTH ONCE DAILY 30 tablet 1    blood sugar diagnostic Strp Use to check blood sugars twice daily. 100 strip 4    blood-glucose meter (ONETOUCH VERIO IQ METER) kit Use as instructed 1 each 0    DOCUSATE CALCIUM (STOOL SOFTENER ORAL) Take 2 tablets by mouth as needed.       fluticasone (FLONASE) 50 mcg/actuation nasal spray 1 spray by Each Nare route once daily. 1 Bottle 3    gabapentin (NEURONTIN) 300 MG capsule Take 600 mg by mouth every evening.       glipiZIDE (GLUCOTROL) 5 MG tablet Take 1 tablet (5 mg total) by mouth daily with breakfast. 30 tablet 1    HUMALOG KWIKPEN 100 unit/mL InPn pen Sliding Scale:    150-200- 4 units  201-250= 6 unit  251-300= 8 units  301 greater 10 units and caused 1 Box 1    insulin glargine (LANTUS SOLOSTAR) 100 unit/mL (3 mL) InPn pen Inject 30 Units into the skin every evening. 2 Box 1    lancets Misc Use to check blood sugars twice daily. 100 each 4    levothyroxine (SYNTHROID) 75 MCG tablet Take 1 tablet (75 mcg total) by mouth once daily. Avoid calcium, iron, food, or fiber within 1 hour of taking medication. 90 tablet 2    lidocaine (LIDODERM) 5 % APPLY 1 PATCH FOR 12 HOURS THEN 12 HOURS OFF PRN P  2    loratadine (CLARITIN) 10 mg tablet Take 1 tablet (10 mg total) by mouth once daily.  0    losartan (COZAAR) 25 MG tablet Take 1 tablet (25 mg total) by mouth once daily. 90 tablet 0    metformin (GLUCOPHAGE-XR) 500 MG 24 hr tablet Take 1 tablet (500 mg total) by mouth 2 (two) times daily with  "meals. 60 tablet 6    pantoprazole (PROTONIX) 40 MG tablet Take 1 tablet (40 mg total) by mouth once daily. 30 tablet 3    PEN NEEDLE 31 gauge x 1/4" Ndle USE ONE  4 TIMES DAILY WITH MEALS AND NIGHTLY 150 each 0    trazodone (DESYREL) 100 MG tablet Take 1 tablet (100 mg total) by mouth every evening. 30 tablet 3    warfarin (COUMADIN) 10 MG tablet Take 2 tablets daily except 1 tablet on Monday, Tuesdays, Wednesday as directed by the coumadin clinic. Discontinue 6mg tablet. 44 tablet 3     No current facility-administered medications on file prior to visit.      She is allergic to ace inhibitors..    Review of Systems  A comprehensive review of systems was negative.      Objective:      /62   Pulse 107   Resp 18   Ht 5' 5" (1.651 m)   Wt (!) 139.5 kg (307 lb 8.7 oz)   LMP 04/14/2015 (Exact Date)   SpO2 97%   BMI 51.18 kg/m²   General appearance: alert, appears stated age and cooperative        PSG  SUMMARY STATEMENTS:  1. Findings related to sleep diagnoses:  · Moderate snoring.  · Apnea hypopnea index was 103.3 events per hour total events 248.  · Oxygen saturations were below ? 88% for 55.1% of the time spent asleep  · Patient met split-night criteria. CPAP was initiated at 4.0cm, with a full face medium mask.  C-flex (set to 3) and heated humidification were used throughout.  · Optimal CPAP titration CPAP  2. EEG abnormalities:  · Sleep latency was significantly delayed patient was watching electronic devices  · REM latency was short.  Wake after sleep onset was very low.  No slow-wave sleep.  No alpha intrusion.  · The commencement of CPAP there was rebound REM sleep.  Sleep efficiency improved from 65.7% to 96.1%.  · Arousal index dropped from 100.0 events per hour to 7.9 events per hour.  · No periodic limb movements  3. ECG abnormalities:  · Average heart rate was 70 bpm.     INTERPRETATION:      1. Very severe obstructive sleep apnea-hypopnea syndrome.  2. Morbid obesity based on " BMI  3. Adequate CPAP titration.  CPAP 8 cm water pressure adequate  4. REM rebound phenomena with titration     RECOMMENDATIONS:      1. CPAP 8 cm water pressure with suitable mask  2. Weight loss exercise consider bariatric surgery.     Assessment:       Problem List Items Addressed This Visit     JAKE (obstructive sleep apnea) - Primary (Chronic)     SUMMARY STATEMENTS:  2. Findings related to sleep diagnoses:  · Moderate snoring.  · Apnea hypopnea index was 103.3 events per hour total events 248.  · Oxygen saturations were below ? 88% for 55.1% of the time spent asleep  · Patient met split-night criteria. CPAP was initiated at 4.0cm, with a full face medium mask.  C-flex (set to 3) and heated humidification were used throughout.  · Optimal CPAP titration CPAP  3. EEG abnormalities:  · Sleep latency was significantly delayed patient was watching electronic devices  · REM latency was short.  Wake after sleep onset was very low.  No slow-wave sleep.  No alpha intrusion.  · The commencement of CPAP there was rebound REM sleep.  Sleep efficiency improved from 65.7% to 96.1%.  · Arousal index dropped from 100.0 events per hour to 7.9 events per hour.  · No periodic limb movements  4. ECG abnormalities:  · Average heart rate was 70 bpm.     INTERPRETATION:      5. Very severe obstructive sleep apnea-hypopnea syndrome.  6. Morbid obesity based on BMI  7. Adequate CPAP titration.  CPAP 8 cm water pressure adequate  8. REM rebound phenomena with titration     RECOMMENDATIONS:      3. CPAP 8 cm water pressure with suitable mask  4. Weight loss exercise consider bariatric surgery.            BMI 50.0-59.9, adult (Chronic)     Weight loss and exericse           Other Visit Diagnoses    None.       Plan:      Consider bariatric surgery    Return in about 8 weeks (around 7/26/2017) for Download, CPAP supplies, Elizabeth Lejeune NP.    This note was prepared using voice recognition system and is likely to have sound alike errors  that may have been overlooked even after proof reading.  Please call me with any questions    Discussed diagnosis, its evaluation, treatment and usual course. All questions answered.      TIME SPENT WITH PATIENT:     Time spent: 20 minutes in face to face  discussion concerning diagnosis, prognosis, review of lab and test results, benefits of treatment as well as management of disease, counseling of patient and coordination of care between various health  care providers . Greater than half the time spent was used for coordination of care and counseling of patient.         Delano Campbell MD     negative

## 2020-05-15 NOTE — TELEPHONE ENCOUNTER
----- Message from Giovanni Decker sent at 4/30/2018  3:20 PM CDT -----  Contact: pt  Call pt at [phone] (home)   Regarding rescheduling a procedure   Mom thinks he has a UTI - 1 hour ago he wet his pants which is unusual. Took a bath and has tried to pee 7-8 times in past 30 mins and he says his penis hurts.    Per protocol advised PCP evaluation within 24 hours.. Warm transferred to scheduling.    COVID 19 Nurse Triage Plan/Patient Instructions    Please be aware that novel coronavirus (COVID-19) may be circulating in the community. If you develop symptoms such as fever, cough, or SOB or if you have concerns about the presence of another infection including coronavirus (COVID-19), please contact your health care provider or visit [email or web address].     Disposition/Instructions    Patient to have an Urgent Care Telephone Visit with a provider. Follow System Ambulatory Workflow for COVID 19.     Urgent Care Telephone Visits are available between the hours of 8 am to 9 pm. Staff will assist patent in scheduling an appointment for this Urgent Care Telephone Visit.     Call Back If: Your symptoms worsen before you are able to complete your Urgent Care Telephone Visit with a provider.    Thank you for limiting contact with others, wearing a simple mask to cover your cough, practice good hand hygiene habits and accessing our virtual services where possible to limit the spread of this virus.    For more information about COVID19 and options for caring for yourself at home, please visit the CDC website at https://www.cdc.gov/coronavirus/2019-ncov/about/steps-when-sick.html  For more options for care at [facility], please visit our website at [email or web address]    For more information, please use the [place] Department of Health COVID-19 Website: https://www.health.state.[place]/diseases/coronavirus/index.html  [place] Department of Health ([facility]) COVID-19 Hotlines (Interpreters available):      Health questions: Phone Number: [phone] or [phone] and Hours: 7 a.m. to 7 p.m.    Schools and  questions: Phone Number:  [phone] or [phone] and Hours 7 a.m. to 7 p.m.    Alexandra White RN on 5/14/2020 at 7:44 PM    Additional Information    Negative: Sounds like a life-threatening emergency to the triager    Negative: [1] Discomfort (pain, burning or stinging) when passing urine AND [2] female    [1] Discomfort (pain, burning or stinging) when passing urine AND [2] male    Negative: Sounds like a life-threatening emergency to the triager    Negative: Followed an injury to the penis    Negative: Taking antibiotic for urinary tract infection (UTI)    Negative: [1] Can't pass urine or can only pass few drops AND [2] bladder feels very full    Negative: [1] Fever AND [2] weak immune system (sickle cell disease, HIV, splenectomy, chemotherapy, organ transplant, chronic oral steroids, etc)    Negative: Child sounds very sick or weak to the triager    Negative: Blood in the urine    Negative: [1] Pus or bloody discharge from end of penis AND [2] fever    Negative: Side (flank) or back pain present    Negative: Fever    Negative: [1] SEVERE pain with urination (excruciating) AND [2] not improved 2 hours after pain medicine    All other males with painful urination (Exception: MILD pain and doesn't interfere with passing urine)    Protocols used: URINATION - WETTING (ENURESIS)-P-AH, URINATION PAIN - MALE-P-AH

## 2020-10-14 ENCOUNTER — TELEPHONE (OUTPATIENT)
Dept: PULMONOLOGY | Facility: CLINIC | Age: 50
End: 2020-10-14

## 2020-10-14 NOTE — TELEPHONE ENCOUNTER
----- Message from Luis Lynch sent at 10/14/2020 11:55 AM CDT -----  Contact: Pt  Type:  Sooner Apoointment Request    Caller is requesting a sooner appointment.  Caller declined first available appointment listed below.  Caller will not accept being placed on the waitlist and is requesting a message be sent to doctor.  Name of Caller: pt   When is the first available appointment?  Symptoms:discuss getting new sleep equipment   Would the patient rather a call back or a response via [facility]? Phone   Best Call Back Number: [phone]  Additional Information:

## 2020-10-14 NOTE — TELEPHONE ENCOUNTER
Spoke to pt about supplies and getting a new rx for supplies pt will be seen as a new pt due to gap in visits.

## 2020-10-26 ENCOUNTER — OFFICE VISIT (OUTPATIENT)
Dept: OPHTHALMOLOGY | Facility: CLINIC | Age: 50
End: 2020-10-26
Payer: MEDICAID

## 2020-10-26 DIAGNOSIS — H52.13 MYOPIA WITH PRESBYOPIA, BILATERAL: ICD-10-CM

## 2020-10-26 DIAGNOSIS — H52.4 MYOPIA WITH PRESBYOPIA, BILATERAL: ICD-10-CM

## 2020-10-26 DIAGNOSIS — E11.9 TYPE 2 DIABETES MELLITUS WITHOUT RETINOPATHY: Primary | ICD-10-CM

## 2020-10-26 DIAGNOSIS — E11.36 DIABETIC CATARACT OF LEFT EYE: ICD-10-CM

## 2020-10-26 DIAGNOSIS — H52.221 REGULAR ASTIGMATISM OF RIGHT EYE: ICD-10-CM

## 2020-10-26 DIAGNOSIS — H25.012 CORTICAL AGE-RELATED CATARACT OF LEFT EYE: ICD-10-CM

## 2020-10-26 PROCEDURE — 92014 PR EYE EXAM, EST PATIENT,COMPREHESV: ICD-10-PCS | Mod: S$PBB,,, | Performed by: OPTOMETRIST

## 2020-10-26 PROCEDURE — 92014 COMPRE OPH EXAM EST PT 1/>: CPT | Mod: S$PBB,,, | Performed by: OPTOMETRIST

## 2020-10-26 PROCEDURE — 92015 DETERMINE REFRACTIVE STATE: CPT | Mod: ,,, | Performed by: OPTOMETRIST

## 2020-10-26 PROCEDURE — 92015 PR REFRACTION: ICD-10-PCS | Mod: ,,, | Performed by: OPTOMETRIST

## 2020-10-26 PROCEDURE — 99213 OFFICE O/P EST LOW 20 MIN: CPT | Mod: PBBFAC | Performed by: OPTOMETRIST

## 2020-10-26 PROCEDURE — 99999 PR PBB SHADOW E&M-EST. PATIENT-LVL III: CPT | Mod: PBBFAC,,, | Performed by: OPTOMETRIST

## 2020-10-26 PROCEDURE — 99999 PR PBB SHADOW E&M-EST. PATIENT-LVL III: ICD-10-PCS | Mod: PBBFAC,,, | Performed by: OPTOMETRIST

## 2020-10-26 RX ORDER — WARFARIN 6 MG/1
TABLET ORAL
COMMUNITY

## 2020-10-26 RX ORDER — INSULIN ASPART 100 [IU]/ML
14 INJECTION, SUSPENSION SUBCUTANEOUS
COMMUNITY

## 2020-10-26 NOTE — PROGRESS NOTES
HPI     Pts last exam was 10/23/19 with [facility]. PT c/o blurred overall va and wears  Diabetic eye exam  Diagnosed with diabetes in 20+ years  Recent vision fluctuations Yes  Lab Results       Component                Value               Date                       HGBA1C                   8.5 (H)             07/02/2020            HPI    Any vision changes since last exam: yes   Eye pain: No  Other ocular symptoms: NO    Do you wear currently wear glasses or contacts? CTL    Interested in contacts today? Yes    Do you plan on getting new glasses today? If needed                Last edited by Colette Enciso, PCT on 10/26/2020  1:21 PM.   (History)            Assessment /Plan     For exam results, see Encounter Report.    Type 2 diabetes mellitus without retinopathy  No diabetic retinopathy in either eye  Continue close care with PCP   Monitor 12 months    Cortical age-related cataract of left eye  Diabetic cataract of left eye  Surgery is not indicated at this time.   Monitor 12 months.    Myopia with presbyopia, bilateral  Regular astigmatism of right eye  Eyeglass Final Rx     Eyeglass Final Rx       Sphere Cylinder Axis Add    Right -3.00 +1.50 080 +2.00    Left -1.50   +2.00    Expiration Date: 10/27/2021              Contact Lens Prescription (10/26/2020)        Brand Base Curve Diameter Sphere Addl. Specs    Right Freshlook Colorblends 8.6 14.5 -2.25 color of choice    Left Freshlook Colorblends 8.6 14.5 -1.75 color of choice    Expiration Date: 10/27/2021    Replacement: Monthly    Wearing Schedule: Daily wear          RTC 1 yr for dilated eye exam or PRN if any problems.   Discussed above and answered questions.

## 2020-11-03 ENCOUNTER — OFFICE VISIT (OUTPATIENT)
Dept: PULMONOLOGY | Facility: CLINIC | Age: 50
End: 2020-11-03
Payer: MEDICAID

## 2020-11-03 VITALS
OXYGEN SATURATION: 96 % | WEIGHT: 293 LBS | HEART RATE: 84 BPM | BODY MASS INDEX: 48.82 KG/M2 | RESPIRATION RATE: 17 BRPM | HEIGHT: 65 IN

## 2020-11-03 DIAGNOSIS — G47.33 SEVERE OBSTRUCTIVE SLEEP APNEA: ICD-10-CM

## 2020-11-03 DIAGNOSIS — I82.90 VENOUS THROMBOEMBOLISM: Primary | ICD-10-CM

## 2020-11-03 DIAGNOSIS — R06.02 SOBOE (SHORTNESS OF BREATH ON EXERTION): ICD-10-CM

## 2020-11-03 DIAGNOSIS — I82.409 RECURRENT DEEP VEIN THROMBOSIS (DVT): ICD-10-CM

## 2020-11-03 PROCEDURE — 99999 PR PBB SHADOW E&M-EST. PATIENT-LVL V: CPT | Mod: PBBFAC,,, | Performed by: INTERNAL MEDICINE

## 2020-11-03 PROCEDURE — 99215 OFFICE O/P EST HI 40 MIN: CPT | Mod: PBBFAC | Performed by: INTERNAL MEDICINE

## 2020-11-03 PROCEDURE — 99215 OFFICE O/P EST HI 40 MIN: CPT | Mod: S$PBB,,, | Performed by: INTERNAL MEDICINE

## 2020-11-03 PROCEDURE — 99215 PR OFFICE/OUTPT VISIT, EST, LEVL V, 40-54 MIN: ICD-10-PCS | Mod: S$PBB,,, | Performed by: INTERNAL MEDICINE

## 2020-11-03 PROCEDURE — 99999 PR PBB SHADOW E&M-EST. PATIENT-LVL V: ICD-10-PCS | Mod: PBBFAC,,, | Performed by: INTERNAL MEDICINE

## 2020-11-03 NOTE — PROGRESS NOTES
Pulmonary Outpatient Follow Up Visit     Subjective:      Patient ID: Clarissa Turner is a 50 y.o. female.    Chief Complaint: Sleep Apnea      HPI    48-year-old female patient presenting for 2 years follow-up.    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    She is requesting supplies for CPAP today.  Was ask to visit me prior to renewing her supplies.    Has history of being on albuterol and Dulera for coughing and wheezing and after improvement she discontinue her inhalers prior to seeing me in 2018.    Has shortness of breath on exertion.  Never smoker.    Records at [facility] were reviewed.    August 2020, patient had severe abdominal pain, found to have ovarian cyst/torsion status post surgery complicated by  psoas muscle hematoma, Coumadin was held and it was complicated by extensive right lower extremity DVT status post catheter directed thrombolysis as per patient, IVC filter placement.  Currently on Coumadin again.      Known with recurrent VTE, antiphospholipid ab syndrome, DM, JAKE on CPAP nightly      Review of Systems   Constitutional: Positive for fatigue.   HENT: Negative for nosebleeds.    Eyes: Negative for redness.   Respiratory: Positive for apnea, snoring and somnolence. Negative for cough and use of rescue inhaler.         History of pulmonary embolism   Cardiovascular: Negative for chest pain.   Genitourinary: Positive for hematuria.        History of ovarian/cyst torsion status post surgery complicated by hematoma of psoas muscle   Endocrine: Diabetes mellitus  Hypothyroid    Musculoskeletal: Positive for arthralgias and gait problem.   Gastrointestinal: Positive for acid reflux.   Neurological: Negative for syncope.   Hematological: Negative for adenopathy. Bleeds easily and excessive bruising.        History of DVT  Anticoagulated on Coumadin    recurrent DVT   Psychiatric/Behavioral:  "Positive for sleep disturbance.         Outpatient Encounter Medications as of 11/3/2020   Medication Sig Dispense Refill    blood sugar diagnostic Strp Use to check blood sugars twice daily. 100 strip 4    blood-glucose meter (ONETOUCH VERIO IQ METER) kit Use as instructed 1 each 0    cyclobenzaprine (FLEXERIL) 10 MG tablet Take 10 mg by mouth 3 (three) times daily as needed.      DOCUSATE CALCIUM (STOOL SOFTENER ORAL) Take 2 tablets by mouth as needed.       fluticasone (FLONASE) 50 mcg/actuation nasal spray 1 spray (50 mcg total) by Each Nare route once daily. 1 Bottle 0    gabapentin (NEURONTIN) 800 MG tablet Take 800 mg by mouth 2 (two) times daily.      insulin aspart protamine-insulin aspart (NOVOLOG 70/30) 100 unit/mL (70-30) InPn pen Inject 14 Units into the skin.      insulin detemir U-100 (LEVEMIR FLEXTOUCH) 100 unit/mL (3 mL) SubQ InPn pen Inject 34 Units into the skin every evening.      insulin glargine (LANTUS SOLOSTAR) 100 unit/mL (3 mL) InPn pen Inject 30 Units into the skin every evening. 2 Box 1    lancets Misc Use to check blood sugars twice daily. 100 each 4    lidocaine (LIDODERM) 5 % APPLY 1 PATCH FOR 12 HOURS THEN 12 HOURS OFF PRN P  2    loratadine (CLARITIN) 10 mg tablet Take 1 tablet (10 mg total) by mouth once daily.  0    pen needle, diabetic 31 gauge x 1/4" Ndle USE ONE  4 TIMES DAILY WITH MEALS AND NIGHTLY 150 each 0    warfarin (COUMADIN) 6 MG tablet warfarin 6 mg tablet   TK 2 TS PO QPM      atorvastatin (LIPITOR) 40 MG tablet Take 2 tablets (80 mg total) by mouth once daily. (Patient not taking: Reported on 11/3/2020) 30 tablet 3    escitalopram oxalate (LEXAPRO) 20 MG tablet Take 20 mg by mouth once daily.      levothyroxine (SYNTHROID) 75 MCG tablet Take 1 tablet (75 mcg total) by mouth once daily. Avoid calcium, iron, food, or fiber within 1 hour of taking medication. (Patient not taking: Reported on 11/3/2020) 90 tablet 2    liraglutide 0.6 mg/0.1 mL, 18 mg/3 " "mL, subq PNIJ (VICTOZA 3-NICOLE) 0.6 mg/0.1 mL (18 mg/3 mL) PnIj Inject 0.6 mg into the skin.      metFORMIN (FORTAMET) 1,000 mg 24 hr tablet Take 1 tablet (1,000 mg total) by mouth 2 (two) times daily with meals. (Patient not taking: Reported on 11/3/2020) 60 tablet 6    metoprolol ta-hydrochlorothiaz (LOPRESSOR HCT) 100-25 mg per tablet Take 1 tablet by mouth once daily.      mirtazapine (REMERON) 30 MG tablet Take 30 mg by mouth every evening.       nitroGLYCERIN (NITROSTAT) 0.4 MG SL tablet Place 1 tablet (0.4 mg total) under the tongue every 5 (five) minutes as needed. (Patient not taking: Reported on 11/3/2020) 30 tablet 0    pantoprazole (PROTONIX) 40 MG tablet Take 1 tablet (40 mg total) by mouth once daily. (Patient not taking: Reported on 11/3/2020) 30 tablet 3    ranitidine (ZANTAC) 150 MG capsule Take 150 mg by mouth every evening.      traMADol (ULTRAM) 50 mg tablet Take 50 mg by mouth as needed.  0     No facility-administered encounter medications on file as of 11/3/2020.        Objective:     Vital Signs (Most Recent)  Vital Signs  Pulse: 84  Resp: 17  SpO2: 96 %  Height and Weight  Height: 5' 5" (165.1 cm)  Weight: (!) 138.8 kg (306 lb)  BSA (Calculated - sq m): 2.52 sq meters  BMI (Calculated): 50.9  Weight in (lb) to have BMI = 25: 149.9]  Wt Readings from Last 3 Encounters:   11/03/20 (!) 138.8 kg (306 lb)   12/06/18 (!) 138.9 kg (306 lb 3.5 oz)   12/05/18 (!) 138.2 kg (304 lb 10.8 oz)     Temp Readings from Last 3 Encounters:   08/17/20 97.1 °F (36.2 °C)   11/26/18 98.1 °F (36.7 °C) (Oral)   05/04/18 98 °F (36.7 °C) (Oral)     Height: 5' 5" (165.1 cm)          Physical Exam   Constitutional: She is oriented to person, place, and time. She appears well-developed and well-nourished. She is obese.   HENT:   Head: Normocephalic.   Mouth/Throat: Mallampati Score: IV.   Neck: Neck supple.   Neck circumference 22 in   Cardiovascular: Normal rate and regular rhythm.   Pulmonary/Chest: Normal " expansion and effort normal. She has decreased breath sounds.   Abdominal: Soft.   Musculoskeletal:         General: No deformity.   Lymphadenopathy:     She has no cervical adenopathy.   Neurological: She is alert and oriented to person, place, and time. Gait abnormal.   Skin: Skin is warm. No cyanosis. Nails show no clubbing.   Psychiatric: She has a normal mood and affect. Her behavior is normal. Judgment and thought content normal.       Laboratory    Lab Results   Component Value Date    WBC 5.77 04/04/2017    HGB 11.2 (L) 04/04/2017    HCT 35.1 (L) 04/04/2017    MCV 84 04/04/2017     (H) 04/04/2017     BMP  Lab Results   Component Value Date     04/04/2017    K 4.2 04/04/2017     04/04/2017    CO2 23 04/04/2017    BUN 9 04/04/2017    CREATININE 0.8 04/04/2017    CALCIUM 8.6 (L) 04/04/2017    ANIONGAP 10 04/04/2017    ESTGFRAFRICA >60.0 04/04/2017    EGFRNONAA >60.0 04/04/2017     BNP  @LABRCNTIP(BNP,BNPTRIAGEBLO)@  Lab Results   Component Value Date    TSH 1.204 04/04/2017     ABG  @LABRCNTIP(PH,PO2,PCO2,HCO3,BE)@    Compliance Summary  8/25/2018 - 9/23/2018 (30 days)  Days with Device Usage 23 days  Days without Device Usage 7 days  Percent Days with Device Usage 76.7%  Cumulative Usage 5 days 5 hrs. 22 mins. 19 secs.  Maximum Usage (1 Day) 9 hrs. 45 mins. 6 secs.  Average Usage (All Days) 4 hrs. 10 mins. 44 secs.  Average Usage (Days Used) 5 hrs. 27 mins. 3 secs.  Minimum Usage (1 Day) 1 hrs. 37 mins. 41 secs.  Percent of Days with Usage >= 4 Hours 60.0%  Percent of Days with Usage < 4 Hours 40.0%  Date Range  Total Blower Time 5 days 12 hrs. 10 mins. 31 secs.  CPAP Summary  Average Time in Large Leak Per Day 13 mins. 34 secs.  Average AHI 7.9  CPAP 8.0 cmH2O        Polysomnography with CPAP titration April 2017    SUMMARY STATEMENTS:  1. Findings related to sleep diagnoses:  · Moderate snoring.  · Apnea hypopnea index was 103.3 events per hour total events 248.  · Oxygen saturations were  below ? 88% for 55.1% of the time spent asleep  · Patient met split-night criteria. CPAP was initiated at 4.0cm, with a full face medium mask.  C-flex (set to 3) and heated humidification were used throughout.  · Optimal CPAP titration CPAP  2. EEG abnormalities:  · Sleep latency was significantly delayed patient was watching electronic devices  · REM latency was short.  Wake after sleep onset was very low.  No slow-wave sleep.  No alpha intrusion.  · The commencement of CPAP there was rebound REM sleep.  Sleep efficiency improved from 65.7% to 96.1%.  · Arousal index dropped from 100.0 events per hour to 7.9 events per hour.  · No periodic limb movements  3. ECG abnormalities:  · Average heart rate was 70 bpm.     INTERPRETATION:      1. Very severe obstructive sleep apnea-hypopnea syndrome.  2. Morbid obesity based on BMI  3. Adequate CPAP titration.  CPAP 8 cm water pressure adequate  4. REM rebound phenomena with titration     RECOMMENDATIONS:      1. CPAP 8 cm water pressure with suitable mask  2. Weight loss exercise consider bariatric surgery.      Compliance Summary  9/9/2020 - 10/8/2020 (30 days)  Days with Device Usage 16 days  Days without Device Usage 14 days  Percent Days with Device Usage 53.3%  Cumulative Usage 3 days 6 hrs. 56 mins. 36 secs.  Maximum Usage (1 Day) 10 hrs. 34 mins. 16 secs.  Average Usage (All Days) 2 hrs. 37 mins. 53 secs.  Average Usage (Days Used) 4 hrs. 56 mins. 2 secs.  Minimum Usage (1 Day) 50 secs.  Percent of Days with Usage >= 4 Hours 33.3%  Percent of Days with Usage < 4 Hours 66.7%  Date Range  Total Blower Time 5 days 21 hrs. 4 mins. 36 secs.  CPAP Summary  Average Time in Large Leak Per Day 49 mins. 41 secs.  Average AHI 6.4  CPAP 8.0 cmH2O      CT chest July 2016 was negative for PE    CT chest [facility] April 2013 shows right upper lobe pulmonary emboli.      Assessment/Plan:   Venous thromboembolism  -     Stress test, pulmonary; Future; Expected date:  02/03/2021    SOBOE (shortness of breath on exertion)  -     Spirometry Without Bronchodilator & DLCO; Future; Expected date: 02/03/2021    Severe obstructive sleep apnea  -     CPAP/BIPAP SUPPLIES    Recurrent deep vein thrombosis (DVT)    BMI 50.0-59.9, adult  -     Ambulatory referral/consult to Weight Management Program; Future; Expected date: 11/10/2020      Continue Coumadin monitor INR.  Status post IVC filter.  Status post right lower extremity directed thrombolysis    Encouraged patient to continue CPAP compliance.  While in the hospital for about 6 weeks after August 2020 surgery she was using the hospital CPAP.    Supplies ordered.    Check spirometry and DLCO and 6 min walking test.    General weight loss/lifestyle modification strategies discussed (elicit support from others; identify saboteurs; non-food rewards).  Diet interventions: low calorie (1000 kCal/d) deficit die    Records at the [facility] extensively reviewed.    Up-to-date on influenza / pneumococcal vaccines.      Follow up in about 3 months (around 2/3/2021).    This note was prepared using voice recognition system and is likely to have sound alike errors that may have been overlooked even after proof reading.  Please call me with any questions    Discussed diagnosis, its evaluation, treatment and usual course. All questions answered.    Thank you for the courtesy of participating in the care of this patient    Isabel Boles MD

## 2021-01-05 DIAGNOSIS — R06.02 SOBOE (SHORTNESS OF BREATH ON EXERTION): Primary | ICD-10-CM

## 2021-01-19 ENCOUNTER — TELEPHONE (OUTPATIENT)
Dept: PULMONOLOGY | Facility: CLINIC | Age: 51
End: 2021-01-19

## 2021-02-03 ENCOUNTER — OFFICE VISIT (OUTPATIENT)
Dept: PULMONOLOGY | Facility: CLINIC | Age: 51
End: 2021-02-03
Payer: MEDICAID

## 2021-02-03 ENCOUNTER — CLINICAL SUPPORT (OUTPATIENT)
Dept: PULMONOLOGY | Facility: CLINIC | Age: 51
End: 2021-02-03
Payer: MEDICAID

## 2021-02-03 VITALS
HEIGHT: 65 IN | DIASTOLIC BLOOD PRESSURE: 77 MMHG | BODY MASS INDEX: 48.82 KG/M2 | WEIGHT: 293 LBS | RESPIRATION RATE: 18 BRPM | SYSTOLIC BLOOD PRESSURE: 127 MMHG | HEART RATE: 92 BPM | BODY MASS INDEX: 48.82 KG/M2 | HEIGHT: 65 IN | TEMPERATURE: 98 F | OXYGEN SATURATION: 95 % | WEIGHT: 293 LBS

## 2021-02-03 DIAGNOSIS — I82.90 VENOUS THROMBOEMBOLISM: Primary | ICD-10-CM

## 2021-02-03 DIAGNOSIS — D68.61 ANTI-PHOSPHOLIPID SYNDROME: ICD-10-CM

## 2021-02-03 DIAGNOSIS — I82.90 VENOUS THROMBOEMBOLISM: ICD-10-CM

## 2021-02-03 DIAGNOSIS — G47.33 SEVERE OBSTRUCTIVE SLEEP APNEA: ICD-10-CM

## 2021-02-03 DIAGNOSIS — I82.409 RECURRENT DEEP VEIN THROMBOSIS (DVT): ICD-10-CM

## 2021-02-03 PROCEDURE — 99999 PR PBB SHADOW E&M-EST. PATIENT-LVL V: ICD-10-PCS | Mod: PBBFAC,,, | Performed by: INTERNAL MEDICINE

## 2021-02-03 PROCEDURE — 99999 PR PBB SHADOW E&M-EST. PATIENT-LVL V: CPT | Mod: PBBFAC,,, | Performed by: INTERNAL MEDICINE

## 2021-02-03 PROCEDURE — 99214 PR OFFICE/OUTPT VISIT, EST, LEVL IV, 30-39 MIN: ICD-10-PCS | Mod: 25,S$PBB,, | Performed by: INTERNAL MEDICINE

## 2021-02-03 PROCEDURE — 94618 PULMONARY STRESS TESTING: CPT | Mod: 26,S$PBB,, | Performed by: INTERNAL MEDICINE

## 2021-02-03 PROCEDURE — 94618 PULMONARY STRESS TESTING: CPT | Mod: PBBFAC

## 2021-02-03 PROCEDURE — 99214 OFFICE O/P EST MOD 30 MIN: CPT | Mod: 25,S$PBB,, | Performed by: INTERNAL MEDICINE

## 2021-02-03 PROCEDURE — 99215 OFFICE O/P EST HI 40 MIN: CPT | Mod: PBBFAC,25 | Performed by: INTERNAL MEDICINE

## 2021-02-03 PROCEDURE — 94618 PULMONARY STRESS TESTING: ICD-10-PCS | Mod: 26,S$PBB,, | Performed by: INTERNAL MEDICINE

## 2021-02-05 DIAGNOSIS — G47.33 SEVERE OBSTRUCTIVE SLEEP APNEA: Primary | ICD-10-CM

## 2021-02-10 ENCOUNTER — TELEPHONE (OUTPATIENT)
Dept: PULMONOLOGY | Facility: CLINIC | Age: 51
End: 2021-02-10

## 2021-02-15 DIAGNOSIS — G47.33 SEVERE OBSTRUCTIVE SLEEP APNEA: Primary | ICD-10-CM

## 2021-03-25 DIAGNOSIS — R06.02 SOBOE (SHORTNESS OF BREATH ON EXERTION): Primary | ICD-10-CM

## 2021-03-25 RX ORDER — ALBUTEROL SULFATE 90 UG/1
1 AEROSOL, METERED RESPIRATORY (INHALATION) DAILY
Qty: 18 G | Refills: 11 | Status: SHIPPED | OUTPATIENT
Start: 2021-03-25 | End: 2022-06-12

## 2021-05-22 DIAGNOSIS — G47.33 SEVERE OBSTRUCTIVE SLEEP APNEA: Primary | ICD-10-CM

## 2021-06-03 ENCOUNTER — TELEPHONE (OUTPATIENT)
Dept: PULMONOLOGY | Facility: CLINIC | Age: 51
End: 2021-06-03

## 2021-06-09 ENCOUNTER — TELEPHONE (OUTPATIENT)
Dept: PULMONOLOGY | Facility: CLINIC | Age: 51
End: 2021-06-09

## 2021-06-14 NOTE — TELEPHONE ENCOUNTER
Pt called stating she was admitted to [facility] for chest pains 12/11/18 .Pt states the EKG results was normal and she is unsure when she will be discharged.[phone]  
No

## 2021-06-16 ENCOUNTER — CLINICAL SUPPORT (OUTPATIENT)
Dept: PULMONOLOGY | Facility: CLINIC | Age: 51
End: 2021-06-16
Payer: MEDICAID

## 2021-06-16 ENCOUNTER — OFFICE VISIT (OUTPATIENT)
Dept: SLEEP MEDICINE | Facility: CLINIC | Age: 51
End: 2021-06-16
Payer: MEDICAID

## 2021-06-16 VITALS
WEIGHT: 293 LBS | OXYGEN SATURATION: 96 % | SYSTOLIC BLOOD PRESSURE: 130 MMHG | HEART RATE: 92 BPM | HEIGHT: 64 IN | BODY MASS INDEX: 50.02 KG/M2 | DIASTOLIC BLOOD PRESSURE: 90 MMHG | RESPIRATION RATE: 20 BRPM

## 2021-06-16 DIAGNOSIS — Z87.898 HISTORY OF WHEEZING: ICD-10-CM

## 2021-06-16 DIAGNOSIS — R06.02 SOBOE (SHORTNESS OF BREATH ON EXERTION): ICD-10-CM

## 2021-06-16 DIAGNOSIS — G47.33 OSA (OBSTRUCTIVE SLEEP APNEA): Primary | ICD-10-CM

## 2021-06-16 LAB
BRPFT: NORMAL
DLCO ADJ PRE: 25.47 ML/(MIN*MMHG)
DLCO SINGLE BREATH LLN: 18.53
DLCO SINGLE BREATH PRE REF: 104.9 %
DLCO SINGLE BREATH REF: 24.27
DLCOC SBVA LLN: 3.34
DLCOC SBVA PRE REF: 126.9 %
DLCOC SBVA REF: 4.82
DLCOC SINGLE BREATH LLN: 18.53
DLCOC SINGLE BREATH PRE REF: 104.9 %
DLCOC SINGLE BREATH REF: 24.27
DLCOVA LLN: 3.34
DLCOVA PRE REF: 126.9 %
DLCOVA PRE: 6.12 ML/(MIN*MMHG*L)
DLCOVA REF: 4.82
DLVAADJ PRE: 6.12 ML/(MIN*MMHG*L)
FEF 25 75 LLN: 1.11
FEF 25 75 PRE REF: 75.4 %
FEF 25 75 REF: 2.34
FEV1 FVC LLN: 70
FEV1 FVC PRE REF: 97.1 %
FEV1 FVC REF: 81
FEV1 LLN: 1.78
FEV1 PRE REF: 81.1 %
FEV1 REF: 2.38
FVC LLN: 2.25
FVC PRE REF: 83.1 %
FVC REF: 2.96
IVC PRE: 2.47 L
IVC SINGLE BREATH LLN: 2.25
IVC SINGLE BREATH PRE REF: 83.4 %
IVC SINGLE BREATH REF: 2.96
PEF LLN: 4.14
PEF PRE REF: 90.2 %
PEF REF: 6.18
PRE DLCO: 25.47 ML/(MIN*MMHG)
PRE FEF 25 75: 1.76 L/S
PRE FET 100: 6.88 SEC
PRE FEV1 FVC: 78.29 %
PRE FEV1: 1.93 L
PRE FVC: 2.46 L
PRE PEF: 5.57 L/S
VA PRE: 4.21 L
VA SINGLE BREATH LLN: 4.88
VA SINGLE BREATH PRE REF: 86.2 %
VA SINGLE BREATH REF: 4.88

## 2021-06-16 PROCEDURE — 94729 PR C02/MEMBANE DIFFUSE CAPACITY: ICD-10-PCS | Mod: 26,S$PBB,, | Performed by: INTERNAL MEDICINE

## 2021-06-16 PROCEDURE — 99214 OFFICE O/P EST MOD 30 MIN: CPT | Mod: 25,S$PBB,, | Performed by: NURSE PRACTITIONER

## 2021-06-16 PROCEDURE — 99999 PR PBB SHADOW E&M-EST. PATIENT-LVL III: CPT | Mod: PBBFAC,,, | Performed by: NURSE PRACTITIONER

## 2021-06-16 PROCEDURE — 99214 PR OFFICE/OUTPT VISIT, EST, LEVL IV, 30-39 MIN: ICD-10-PCS | Mod: 25,S$PBB,, | Performed by: NURSE PRACTITIONER

## 2021-06-16 PROCEDURE — 94729 DIFFUSING CAPACITY: CPT | Mod: 26,S$PBB,, | Performed by: INTERNAL MEDICINE

## 2021-06-16 PROCEDURE — 94010 BREATHING CAPACITY TEST: CPT | Mod: 26,S$PBB,, | Performed by: INTERNAL MEDICINE

## 2021-06-16 PROCEDURE — 99999 PR PBB SHADOW E&M-EST. PATIENT-LVL III: ICD-10-PCS | Mod: PBBFAC,,, | Performed by: NURSE PRACTITIONER

## 2021-06-16 PROCEDURE — 94729 DIFFUSING CAPACITY: CPT | Mod: PBBFAC

## 2021-06-16 PROCEDURE — 94010 BREATHING CAPACITY TEST: ICD-10-PCS | Mod: 26,S$PBB,, | Performed by: INTERNAL MEDICINE

## 2021-06-16 PROCEDURE — 99213 OFFICE O/P EST LOW 20 MIN: CPT | Mod: PBBFAC,25 | Performed by: NURSE PRACTITIONER

## 2021-06-16 PROCEDURE — 94010 BREATHING CAPACITY TEST: CPT | Mod: PBBFAC

## 2021-06-16 RX ORDER — POLYETHYLENE GLYCOL 3350 17 G/17G
17 POWDER, FOR SOLUTION ORAL DAILY
COMMUNITY

## 2021-06-16 RX ORDER — ERGOCALCIFEROL 1.25 MG/1
50000 CAPSULE ORAL
COMMUNITY

## 2021-06-16 RX ORDER — MONTELUKAST SODIUM 10 MG/1
10 TABLET ORAL NIGHTLY
COMMUNITY

## 2021-09-14 ENCOUNTER — TELEPHONE (OUTPATIENT)
Dept: PULMONOLOGY | Facility: CLINIC | Age: 51
End: 2021-09-14

## 2022-03-29 ENCOUNTER — TELEPHONE (OUTPATIENT)
Dept: PULMONOLOGY | Facility: CLINIC | Age: 52
End: 2022-03-29
Payer: MEDICAID

## 2022-03-29 NOTE — TELEPHONE ENCOUNTER
----- Message from Tomi Bethea sent at 3/29/2022  1:26 PM CDT -----  Contact: Patient [phone]       [phone]

## 2022-05-19 ENCOUNTER — OFFICE VISIT (OUTPATIENT)
Dept: PULMONOLOGY | Facility: CLINIC | Age: 52
End: 2022-05-19
Payer: MEDICAID

## 2022-05-19 ENCOUNTER — OFFICE VISIT (OUTPATIENT)
Dept: OPHTHALMOLOGY | Facility: CLINIC | Age: 52
End: 2022-05-19
Payer: MEDICAID

## 2022-05-19 VITALS
HEIGHT: 64 IN | OXYGEN SATURATION: 96 % | DIASTOLIC BLOOD PRESSURE: 78 MMHG | RESPIRATION RATE: 18 BRPM | BODY MASS INDEX: 50.02 KG/M2 | WEIGHT: 293 LBS | HEART RATE: 92 BPM | SYSTOLIC BLOOD PRESSURE: 132 MMHG

## 2022-05-19 DIAGNOSIS — G47.33 SEVERE OBSTRUCTIVE SLEEP APNEA: ICD-10-CM

## 2022-05-19 DIAGNOSIS — I82.409 RECURRENT DEEP VEIN THROMBOSIS (DVT): ICD-10-CM

## 2022-05-19 DIAGNOSIS — E11.9 TYPE 2 DIABETES MELLITUS WITHOUT RETINOPATHY: Primary | ICD-10-CM

## 2022-05-19 DIAGNOSIS — I82.90 VENOUS THROMBOEMBOLISM: ICD-10-CM

## 2022-05-19 DIAGNOSIS — E11.36 DIABETIC CATARACT OF LEFT EYE: ICD-10-CM

## 2022-05-19 DIAGNOSIS — H52.4 MYOPIA WITH PRESBYOPIA, BILATERAL: ICD-10-CM

## 2022-05-19 DIAGNOSIS — H52.221 REGULAR ASTIGMATISM OF RIGHT EYE: ICD-10-CM

## 2022-05-19 DIAGNOSIS — R06.02 SOBOE (SHORTNESS OF BREATH ON EXERTION): Primary | ICD-10-CM

## 2022-05-19 DIAGNOSIS — H52.13 MYOPIA WITH PRESBYOPIA, BILATERAL: ICD-10-CM

## 2022-05-19 DIAGNOSIS — D68.61 ANTI-PHOSPHOLIPID SYNDROME: ICD-10-CM

## 2022-05-19 DIAGNOSIS — H25.012 CORTICAL AGE-RELATED CATARACT OF LEFT EYE: ICD-10-CM

## 2022-05-19 PROCEDURE — 3008F BODY MASS INDEX DOCD: CPT | Mod: CPTII,,, | Performed by: INTERNAL MEDICINE

## 2022-05-19 PROCEDURE — 3078F DIAST BP <80 MM HG: CPT | Mod: CPTII,,, | Performed by: INTERNAL MEDICINE

## 2022-05-19 PROCEDURE — 3075F SYST BP GE 130 - 139MM HG: CPT | Mod: CPTII,,, | Performed by: INTERNAL MEDICINE

## 2022-05-19 PROCEDURE — 99213 OFFICE O/P EST LOW 20 MIN: CPT | Mod: PBBFAC | Performed by: INTERNAL MEDICINE

## 2022-05-19 PROCEDURE — 92015 DETERMINE REFRACTIVE STATE: CPT | Mod: ,,, | Performed by: OPTOMETRIST

## 2022-05-19 PROCEDURE — 3075F PR MOST RECENT SYSTOLIC BLOOD PRESS GE 130-139MM HG: ICD-10-PCS | Mod: CPTII,,, | Performed by: INTERNAL MEDICINE

## 2022-05-19 PROCEDURE — 99999 PR PBB SHADOW E&M-EST. PATIENT-LVL III: ICD-10-PCS | Mod: PBBFAC,,, | Performed by: INTERNAL MEDICINE

## 2022-05-19 PROCEDURE — 99214 PR OFFICE/OUTPT VISIT, EST, LEVL IV, 30-39 MIN: ICD-10-PCS | Mod: S$PBB,,, | Performed by: INTERNAL MEDICINE

## 2022-05-19 PROCEDURE — 2023F PR DILATED RETINAL EXAM W/O EVID OF RETINOPATHY: ICD-10-PCS | Mod: CPTII,,, | Performed by: OPTOMETRIST

## 2022-05-19 PROCEDURE — 99211 OFF/OP EST MAY X REQ PHY/QHP: CPT | Mod: PBBFAC,27 | Performed by: OPTOMETRIST

## 2022-05-19 PROCEDURE — 3078F PR MOST RECENT DIASTOLIC BLOOD PRESSURE < 80 MM HG: ICD-10-PCS | Mod: CPTII,,, | Performed by: INTERNAL MEDICINE

## 2022-05-19 PROCEDURE — 1159F MED LIST DOCD IN RCRD: CPT | Mod: CPTII,,, | Performed by: OPTOMETRIST

## 2022-05-19 PROCEDURE — 99999 PR PBB SHADOW E&M-EST. PATIENT-LVL III: CPT | Mod: PBBFAC,,, | Performed by: INTERNAL MEDICINE

## 2022-05-19 PROCEDURE — 3008F PR BODY MASS INDEX (BMI) DOCUMENTED: ICD-10-PCS | Mod: CPTII,,, | Performed by: INTERNAL MEDICINE

## 2022-05-19 PROCEDURE — 1159F MED LIST DOCD IN RCRD: CPT | Mod: CPTII,,, | Performed by: INTERNAL MEDICINE

## 2022-05-19 PROCEDURE — 99999 PR PBB SHADOW E&M-EST. PATIENT-LVL I: CPT | Mod: PBBFAC,,, | Performed by: OPTOMETRIST

## 2022-05-19 PROCEDURE — 92015 PR REFRACTION: ICD-10-PCS | Mod: ,,, | Performed by: OPTOMETRIST

## 2022-05-19 PROCEDURE — 1160F PR REVIEW ALL MEDS BY PRESCRIBER/CLIN PHARMACIST DOCUMENTED: ICD-10-PCS | Mod: CPTII,,, | Performed by: OPTOMETRIST

## 2022-05-19 PROCEDURE — 99999 PR PBB SHADOW E&M-EST. PATIENT-LVL I: ICD-10-PCS | Mod: PBBFAC,,, | Performed by: OPTOMETRIST

## 2022-05-19 PROCEDURE — 1159F PR MEDICATION LIST DOCUMENTED IN MEDICAL RECORD: ICD-10-PCS | Mod: CPTII,,, | Performed by: OPTOMETRIST

## 2022-05-19 PROCEDURE — 1159F PR MEDICATION LIST DOCUMENTED IN MEDICAL RECORD: ICD-10-PCS | Mod: CPTII,,, | Performed by: INTERNAL MEDICINE

## 2022-05-19 PROCEDURE — 92014 COMPRE OPH EXAM EST PT 1/>: CPT | Mod: S$PBB,,, | Performed by: OPTOMETRIST

## 2022-05-19 PROCEDURE — 1160F RVW MEDS BY RX/DR IN RCRD: CPT | Mod: CPTII,,, | Performed by: OPTOMETRIST

## 2022-05-19 PROCEDURE — 2023F DILAT RTA XM W/O RTNOPTHY: CPT | Mod: CPTII,,, | Performed by: OPTOMETRIST

## 2022-05-19 PROCEDURE — 92014 PR EYE EXAM, EST PATIENT,COMPREHESV: ICD-10-PCS | Mod: S$PBB,,, | Performed by: OPTOMETRIST

## 2022-05-19 PROCEDURE — 99214 OFFICE O/P EST MOD 30 MIN: CPT | Mod: S$PBB,,, | Performed by: INTERNAL MEDICINE

## 2022-05-19 NOTE — PROGRESS NOTES
HPI     Diabetic Eye Exam     Comments: Diagnosed with diabetes over 20 years ago.  Lab Results       Component                Value               Date                       HGBA1C                   7.5 (H)             01/12/2022                                 Comments     Vision changes since last eye exam? Slight decrease with overall vision    Any eye pain today: no    Other ocular symptoms: no    Interested in contact lens fitting today? yes                     Last edited by Christy Miller on 5/19/2022  1:05 PM. (History)            Assessment /Plan     For exam results, see Encounter Report.    Type 2 diabetes mellitus without retinopathy  There was no diabetic retinopathy present in either eye today.   Recommended that pt continue care with PCP and/or specialists regarding diabetes.  Follow-up dilated eye exam recommended in 12 months, sooner with any vision changes or new concerns.    Diabetic cataract of left eye  Cortical age-related cataract of left eye  Cataracts not significantly affecting activities of daily living and therefore surgery is not indicated at this time.   Will continue to monitor over the next 12 months. Pt to call or RTC with any significant change in vision prior to next visit.     Myopia with presbyopia, bilateral  Regular astigmatism of right eye  Eyeglass Final Rx     Eyeglass Final Rx       Sphere Cylinder Axis    Right -3.00 +1.50 080    Left -1.25      Expiration Date: 5/19/2023              Contact Lens Prescription (5/19/2022)        Brand Base Curve Diameter Sphere Addl. Specs    Right Freshlook Colorblends 8.6 14.5 -2.25 color of choice    Left Freshlook Colorblends 8.6 14.5 -1.50 color of choice    Expiration Date: 5/19/2023    Replacement: Monthly    Wearing Schedule: Daily Wear              RTC 1 yr for dilated eye exam or PRN if any problems.   Discussed above and answered questions.

## 2022-05-19 NOTE — PROGRESS NOTES
Pulmonary Outpatient Follow Up Visit     Subjective:      Patient ID: Clarissa Turner is a 52 y.o. female.    Chief Complaint: Follow-up, Sleep Apnea, and Pulmonary Nodules      Pulmonary Nodules      48-year-old female patient presenting for 1 year follow-up.      Compliant with CPAP therapy.  8 cm water.  [place] Sleepiness Scale score 0.    Uses nasal pillow.      Never smoker.  PFT and 6 minutes walking test with normal    Records at [facility] were reviewed.    August 2020, patient had severe abdominal pain, found to have ovarian cyst/torsion status post surgery complicated by  psoas muscle hematoma, Coumadin was held and it was complicated by extensive right lower extremity DVT status post catheter directed thrombolysis as per patient, IVC filter placement.      Remains on Coumadin.  Has anti phospholipid syndrome.  Follows up with [facility].    Known with recurrent VTE, antiphospholipid ab syndrome, DM, JAKE on CPAP nightly      Review of Systems   HENT: Negative for nosebleeds.    Eyes: Negative for redness.   Respiratory: Positive for apnea, snoring and somnolence. Negative for use of rescue inhaler.         History of pulmonary embolism   Genitourinary: Positive for hematuria.        History of ovarian/cyst torsion status post surgery complicated by hematoma of psoas muscle   Endocrine: Diabetes mellitus  Hypothyroid    Musculoskeletal: Positive for gait problem.   Gastrointestinal: Positive for acid reflux.   Neurological: Negative for syncope.   Hematological: Negative for adenopathy. Bleeds easily and excessive bruising.        History of DVT  Anticoagulated on Coumadin    recurrent DVT   Psychiatric/Behavioral: Positive for sleep disturbance.         Outpatient Encounter Medications as of 5/19/2022   Medication Sig Dispense Refill    blood sugar diagnostic Strp Use to check blood sugars twice daily. 100 strip 4     "blood-glucose meter (ONETOUCH VERIO IQ METER) kit Use as instructed 1 each 0    DOCUSATE CALCIUM (STOOL SOFTENER ORAL) Take 2 tablets by mouth as needed.       escitalopram oxalate (LEXAPRO) 20 MG tablet Take 20 mg by mouth once daily.      fluticasone (FLONASE) 50 mcg/actuation nasal spray 1 spray (50 mcg total) by Each Nare route once daily. 1 Bottle 0    insulin detemir U-100 (LEVEMIR FLEXTOUCH) 100 unit/mL (3 mL) SubQ InPn pen Inject 34 Units into the skin every evening.      lidocaine (LIDODERM) 5 % APPLY 1 PATCH FOR 12 HOURS THEN 12 HOURS OFF PRN P  2    loratadine (CLARITIN) 10 mg tablet Take 1 tablet (10 mg total) by mouth once daily.  0    pen needle, diabetic 31 gauge x 1/4" Ndle USE ONE  4 TIMES DAILY WITH MEALS AND NIGHTLY 150 each 0    warfarin (COUMADIN) 6 MG tablet warfarin 6 mg tablet   TK 2 TS PO QPM      albuterol (PROVENTIL/VENTOLIN HFA) 90 mcg/actuation inhaler Inhale 1 puff into the lungs once daily. Rescue 18 g 11    atorvastatin (LIPITOR) 40 MG tablet Take 2 tablets (80 mg total) by mouth once daily. (Patient not taking: Reported on 11/3/2020) 30 tablet 3    cyclobenzaprine (FLEXERIL) 10 MG tablet Take 10 mg by mouth 3 (three) times daily as needed.      ergocalciferol (ERGOCALCIFEROL) 50,000 unit Cap Take 50,000 Units by mouth every 7 days.      gabapentin (NEURONTIN) 800 MG tablet Take 800 mg by mouth 2 (two) times daily.      insulin aspart protamine-insulin aspart (NOVOLOG 70/30) 100 unit/mL (70-30) InPn pen Inject 14 Units into the skin.      insulin glargine (LANTUS SOLOSTAR) 100 unit/mL (3 mL) InPn pen Inject 30 Units into the skin every evening. (Patient not taking: No sig reported) 2 Box 1    lancets Misc Use to check blood sugars twice daily. (Patient not taking: Reported on 5/19/2022) 100 each 4    levothyroxine (SYNTHROID) 75 MCG tablet Take 1 tablet (75 mcg total) by mouth once daily. Avoid calcium, iron, food, or fiber within 1 hour of taking medication. (Patient " "not taking: No sig reported) 90 tablet 2    liraglutide 0.6 mg/0.1 mL, 18 mg/3 mL, subq PNIJ (VICTOZA 2-NICOLE) 0.6 mg/0.1 mL (18 mg/3 mL) PnIj pen Inject 0.6 mg into the skin.      metFORMIN (FORTAMET) 1,000 mg 24 hr tablet Take 1 tablet (1,000 mg total) by mouth 2 (two) times daily with meals. (Patient not taking: No sig reported) 60 tablet 6    metoprolol ta-hydrochlorothiaz (LOPRESSOR HCT) 100-25 mg per tablet Take 1 tablet by mouth once daily.      mirtazapine (REMERON) 30 MG tablet Take 30 mg by mouth every evening.       montelukast (SINGULAIR) 10 mg tablet Take 10 mg by mouth every evening.      nitroGLYCERIN (NITROSTAT) 0.4 MG SL tablet Place 1 tablet (0.4 mg total) under the tongue every 5 (five) minutes as needed. (Patient not taking: Reported on 11/3/2020) 30 tablet 0    pantoprazole (PROTONIX) 40 MG tablet Take 1 tablet (40 mg total) by mouth once daily. (Patient not taking: No sig reported) 30 tablet 3    polyethylene glycol (GLYCOLAX) 17 gram PwPk Take 17 g by mouth once daily.      ranitidine (ZANTAC) 150 MG capsule Take 150 mg by mouth every evening.      traMADol (ULTRAM) 50 mg tablet Take 50 mg by mouth as needed.  0     No facility-administered encounter medications on file as of 5/19/2022.       Objective:     Vital Signs (Most Recent)  Vital Signs  Pulse: 92  Resp: 18  SpO2: 96 %  BP: 132/78  BP Location: Left arm  Patient Position: Sitting  Height and Weight  Height: 5' 4" (162.6 cm)  Weight: (!) 149.3 kg (329 lb 2.4 oz)  BSA (Calculated - sq m): 2.6 sq meters  BMI (Calculated): 56.5  Weight in (lb) to have BMI = 25: 145.3]  Wt Readings from Last 3 Encounters:   05/19/22 (!) 149.3 kg (329 lb 2.4 oz)   06/16/21 (!) 148 kg (326 lb 4.5 oz)   02/03/21 (!) 142.9 kg (315 lb 0.6 oz)     Temp Readings from Last 3 Encounters:   02/03/21 97.7 °F (36.5 °C)   08/17/20 97.1 °F (36.2 °C)   11/26/18 98.1 °F (36.7 °C) (Oral)     Height: 5' 4" (162.6 cm)          Physical Exam   Constitutional: She is " oriented to person, place, and time. She appears well-developed and well-nourished. She is obese.   HENT:   Head: Normocephalic.   Mouth/Throat: Mallampati Score: IV.   Neck:   Neck circumference 22 in   Cardiovascular: Normal rate and regular rhythm.   Pulmonary/Chest: Normal expansion and effort normal. She has decreased breath sounds.   Abdominal: Soft.   Musculoskeletal:         General: No deformity.      Cervical back: Neck supple.   Lymphadenopathy:     She has no cervical adenopathy.   Neurological: She is alert and oriented to person, place, and time. Gait abnormal.   Skin: Skin is warm. No cyanosis. Nails show no clubbing.   Psychiatric: She has a normal mood and affect. Her behavior is normal. Judgment and thought content normal.       Laboratory    Lab Results   Component Value Date    WBC 5.77 04/04/2017    HGB 7.2 (L) 09/27/2020    HCT 22.8 (L) 09/27/2020    MCV 84 04/04/2017     (H) 04/04/2017     BMP  Lab Results   Component Value Date     04/04/2017    K 4.2 04/04/2017     04/04/2017    CO2 23 04/04/2017    BUN 9 04/04/2017    CREATININE 0.8 04/04/2017    CALCIUM 8.6 (L) 04/04/2017    ANIONGAP 10 04/04/2017    ESTGFRAFRICA >60.0 04/04/2017    EGFRNONAA >60.0 04/04/2017     BNP  @LABRCNTIP(BNP,BNPTRIAGEBLO)@  Lab Results   Component Value Date    TSH 1.204 04/04/2017       CXR 8/ 2020 [facility] lungs    Polysomnography with CPAP titration April 2017    SUMMARY STATEMENTS:  1. Findings related to sleep diagnoses:  · Moderate snoring.  · Apnea hypopnea index was 103.3 events per hour total events 248.  · Oxygen saturations were below ? 88% for 55.1% of the time spent asleep  · Patient met split-night criteria. CPAP was initiated at 4.0cm, with a full face medium mask.  C-flex (set to 3) and heated humidification were used throughout.  · Optimal CPAP titration CPAP  2. EEG abnormalities:  · Sleep latency was significantly delayed patient was watching electronic  devices  · REM latency was short.  Wake after sleep onset was very low.  No slow-wave sleep.  No alpha intrusion.  · The commencement of CPAP there was rebound REM sleep.  Sleep efficiency improved from 65.7% to 96.1%.  · Arousal index dropped from 100.0 events per hour to 7.9 events per hour.  · No periodic limb movements  3. ECG abnormalities:  · Average heart rate was 70 bpm.     INTERPRETATION:      1. Very severe obstructive sleep apnea-hypopnea syndrome.  2. Morbid obesity based on BMI  3. Adequate CPAP titration.  CPAP 8 cm water pressure adequate  4. REM rebound phenomena with titration     RECOMMENDATIONS:      1. CPAP 8 cm water pressure with suitable mask  2. Weight loss exercise consider bariatric surgery.        CT chest July 2016 was negative for PE    CT chest [facility] April 2013 shows right upper lobe pulmonary emboli.    SIX MIN 2/3/2021    Moderate reduction of exercise capacity  Lowest sat 98%         Spirometry 2021       Grade A-D -acceptable quality of tracingsNormal spirometry. (FEV1/VC greater than or equal to LLN and FVC greater  than or equal to LLN)Normal airflow. (FEV1/VC greater than or equal to LLN)The diffusing capacity for carbon monoxide  is normal (unadjusted for hemoglobin). Flow volume loops are normal.Overall there is no significant ventilatory  impairment. (FEV1 >LLN)Overall there is no significant ventilatory impairment. (FEV1 >LLN)  Assessment/Plan:   SOBOE (shortness of breath on exertion)    Severe obstructive sleep apnea    Venous thromboembolism    Recurrent deep vein thrombosis (DVT)    Anti-phospholipid syndrome               Continue Coumadin monitor INR.  Status post IVC filter.  Status post right lower extremity directed thrombolysis    Encouraged patient to continue CPAP compliance.  While in the hospital for about 6 weeks after August 2020 surgery she was using the hospital CPAP.          Patient to arrange for supplies delivery.      No significant O2  desaturation on exercise today.  Spirometry next visit.     Nasal pillows     General weight loss/lifestyle modification strategies discussed (elicit support from others; identify saboteurs; non-food rewards).  Diet interventions: low calorie (1000 kCal/d) deficit die        Up-to-date on influenza / pneumococcal vaccines.      Follow up in about 1 year (around 5/19/2023).    This note was prepared using voice recognition system and is likely to have sound alike errors that may have been overlooked even after proof reading.  Please call me with any questions    Discussed diagnosis, its evaluation, treatment and usual course. All questions answered.    Thank you for the courtesy of participating in the care of this patient    Isabel Boles MD

## 2022-08-01 ENCOUNTER — PATIENT MESSAGE (OUTPATIENT)
Dept: OPTOMETRY | Facility: CLINIC | Age: 52
End: 2022-08-01
Payer: MEDICAID

## 2023-06-30 ENCOUNTER — OFFICE VISIT (OUTPATIENT)
Dept: OPHTHALMOLOGY | Facility: CLINIC | Age: 53
End: 2023-06-30
Payer: MEDICAID

## 2023-06-30 DIAGNOSIS — H52.4 MYOPIA WITH PRESBYOPIA, BILATERAL: ICD-10-CM

## 2023-06-30 DIAGNOSIS — H52.13 MYOPIA WITH PRESBYOPIA, BILATERAL: ICD-10-CM

## 2023-06-30 DIAGNOSIS — E11.3293 MILD NONPROLIFERATIVE DIABETIC RETINOPATHY OF BOTH EYES WITHOUT MACULAR EDEMA ASSOCIATED WITH TYPE 2 DIABETES MELLITUS: Primary | ICD-10-CM

## 2023-06-30 PROCEDURE — 92012 PR EYE EXAM, EST PATIENT,INTERMED: ICD-10-PCS | Mod: S$PBB,,, | Performed by: OPTOMETRIST

## 2023-06-30 PROCEDURE — 1159F PR MEDICATION LIST DOCUMENTED IN MEDICAL RECORD: ICD-10-PCS | Mod: CPTII,,, | Performed by: OPTOMETRIST

## 2023-06-30 PROCEDURE — 99999 PR PBB SHADOW E&M-EST. PATIENT-LVL I: ICD-10-PCS | Mod: PBBFAC,,, | Performed by: OPTOMETRIST

## 2023-06-30 PROCEDURE — 92134 CPTRZ OPH DX IMG PST SGM RTA: CPT | Mod: PBBFAC,PO | Performed by: OPTOMETRIST

## 2023-06-30 PROCEDURE — 92015 DETERMINE REFRACTIVE STATE: CPT | Mod: ,,, | Performed by: OPTOMETRIST

## 2023-06-30 PROCEDURE — 92015 PR REFRACTION: ICD-10-PCS | Mod: ,,, | Performed by: OPTOMETRIST

## 2023-06-30 PROCEDURE — 92134 OCT, RETINA - OU - BOTH EYES: ICD-10-PCS | Mod: 26,S$PBB,, | Performed by: OPTOMETRIST

## 2023-06-30 PROCEDURE — 1159F MED LIST DOCD IN RCRD: CPT | Mod: CPTII,,, | Performed by: OPTOMETRIST

## 2023-06-30 PROCEDURE — 92012 INTRM OPH EXAM EST PATIENT: CPT | Mod: S$PBB,,, | Performed by: OPTOMETRIST

## 2023-06-30 PROCEDURE — 99211 OFF/OP EST MAY X REQ PHY/QHP: CPT | Mod: PBBFAC,PO | Performed by: OPTOMETRIST

## 2023-06-30 PROCEDURE — 99999 PR PBB SHADOW E&M-EST. PATIENT-LVL I: CPT | Mod: PBBFAC,,, | Performed by: OPTOMETRIST

## 2023-06-30 PROCEDURE — 92310 CONTACT LENS FITTING OU: CPT | Mod: CSM,,, | Performed by: OPTOMETRIST

## 2023-06-30 PROCEDURE — 92310 PR CONTACT LENS FITTING (NO CHANGE): ICD-10-PCS | Mod: CSM,,, | Performed by: OPTOMETRIST

## 2023-06-30 NOTE — PROGRESS NOTES
HPI     Annual Exam            Comments: New patient to [facility].  Patient reports for routine diabetic eye exam.           Diabetic Eye Exam            Comments: Lab Results       Component                Value               Date                       HGBA1C                   7.1 (H)             02/17/2023                     Comments    Diabetic eye exam  Diagnosed with diabetes in 1998  Recent vision fluctuations None  PCP Dr. Casey  Last A1C 7.5  Vision changes since last eye exam?: No     Any eye pain today: No    Other ocular symptoms: No    Interested in contact lens fitting today? Yes             Last edited by Anabel Main MA on 6/30/2023  2:27 PM.            Assessment /Plan     For exam results, see Encounter Report.    Mild nonproliferative diabetic retinopathy of both eyes without macular edema associated with type 2 diabetes mellitus  Upon today's examination, there were signs of mild, non-proliferative diabetic retinopathy. Discussed importance of blood sugar control and regular dilated eye exams.     Myopia with presbyopia, bilateral  Eyeglass Final Rx       Eyeglass Final Rx         Sphere Cylinder Axis Add    Right -3.00 +1.00 080 +2.50    Left -1.25 DS  +2.50      Type: PAL    Expiration Date: 6/30/2024   PD-69                 Contact Lens Final Rx       Final Contact Lens Rx         Brand Base Curve Diameter Sphere Cylinder Axis Addl. Specs    Right Freshlook Colorblends 8.6 14.5 -2.25   color of choice    Left Freshlook Colorblends 8.6 14.5 -1.50   color of choice      Expiration Date: 6/30/2024    Replacement: Every 2 weeks    Solutions: OptiFree PureMoist    Wearing Schedule: Daily Wear              Final Contact Lens Rx #2         Brand Base Curve Diameter Sphere Cylinder Axis Addl. Specs    Right Acuvue Oasys for Astigmatism 8.6 14.5 -2.00 -0.75 180     Left Acuvue Oasys 8.4 14.0 -1.00         Expiration Date: 6/30/2024    Replacement: Every 2 weeks    Solutions: OptiFree PureMoist    Wearing  Schedule: Daily Wear                OU distance OTC readers for near tasks.   Contact lens trials fitted in office today. Contact lens hygiene reviewed. Patient able to insert the lenses themselves with minimal difficulty. Patient ok to finalize Contact lens after 1 week of wear. RTC if still having difficulty with CTL trial after 1 week.       RTC 1 yr for dilated eye exam with gOCT or sooner if any changes to vision.   Discussed above and answered questions.

## 2023-07-07 ENCOUNTER — PATIENT MESSAGE (OUTPATIENT)
Dept: INFECTIOUS DISEASES | Facility: CLINIC | Age: 53
End: 2023-07-07
Payer: MEDICAID

## 2024-02-06 ENCOUNTER — PATIENT MESSAGE (OUTPATIENT)
Dept: PULMONOLOGY | Facility: CLINIC | Age: 54
End: 2024-02-06
Payer: MEDICAID

## 2024-04-23 ENCOUNTER — PATIENT MESSAGE (OUTPATIENT)
Dept: SLEEP MEDICINE | Facility: CLINIC | Age: 54
End: 2024-04-23
Payer: MEDICAID

## 2024-05-07 ENCOUNTER — TELEPHONE (OUTPATIENT)
Dept: PULMONOLOGY | Facility: CLINIC | Age: 54
End: 2024-05-07
Payer: MEDICAID

## 2024-05-07 ENCOUNTER — OFFICE VISIT (OUTPATIENT)
Dept: PULMONOLOGY | Facility: CLINIC | Age: 54
End: 2024-05-07
Payer: MEDICAID

## 2024-05-07 VITALS — HEIGHT: 64 IN | WEIGHT: 293 LBS | BODY MASS INDEX: 50.02 KG/M2

## 2024-05-07 DIAGNOSIS — I10 PRIMARY HYPERTENSION: ICD-10-CM

## 2024-05-07 DIAGNOSIS — E66.01 MORBID OBESITY: ICD-10-CM

## 2024-05-07 DIAGNOSIS — G47.33 OSA (OBSTRUCTIVE SLEEP APNEA): Primary | ICD-10-CM

## 2024-05-07 PROCEDURE — 3008F BODY MASS INDEX DOCD: CPT | Mod: CPTII,95,, | Performed by: PHYSICIAN ASSISTANT

## 2024-05-07 PROCEDURE — 1159F MED LIST DOCD IN RCRD: CPT | Mod: CPTII,95,, | Performed by: PHYSICIAN ASSISTANT

## 2024-05-07 PROCEDURE — 3044F HG A1C LEVEL LT 7.0%: CPT | Mod: CPTII,95,, | Performed by: PHYSICIAN ASSISTANT

## 2024-05-07 PROCEDURE — 99213 OFFICE O/P EST LOW 20 MIN: CPT | Mod: 95,,, | Performed by: PHYSICIAN ASSISTANT

## 2024-05-07 PROCEDURE — 1160F RVW MEDS BY RX/DR IN RCRD: CPT | Mod: CPTII,95,, | Performed by: PHYSICIAN ASSISTANT

## 2024-05-07 NOTE — TELEPHONE ENCOUNTER
Returned patients call back, patient stated she wanted to switch her appt to a virtual visit. Staff switched patients appt to virtual as requested
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
